# Patient Record
Sex: FEMALE | Race: WHITE | NOT HISPANIC OR LATINO | Employment: OTHER | URBAN - METROPOLITAN AREA
[De-identification: names, ages, dates, MRNs, and addresses within clinical notes are randomized per-mention and may not be internally consistent; named-entity substitution may affect disease eponyms.]

---

## 2017-03-06 ENCOUNTER — OFFICE VISIT (OUTPATIENT)
Dept: INTERNAL MEDICINE | Facility: CLINIC | Age: 72
End: 2017-03-06

## 2017-03-06 VITALS
HEART RATE: 98 BPM | BODY MASS INDEX: 26.11 KG/M2 | HEIGHT: 60 IN | SYSTOLIC BLOOD PRESSURE: 210 MMHG | WEIGHT: 133 LBS | TEMPERATURE: 97.8 F | DIASTOLIC BLOOD PRESSURE: 110 MMHG | RESPIRATION RATE: 16 BRPM | OXYGEN SATURATION: 98 %

## 2017-03-06 DIAGNOSIS — R30.0 DYSURIA: ICD-10-CM

## 2017-03-06 DIAGNOSIS — R10.32 LEFT LOWER QUADRANT PAIN: Primary | ICD-10-CM

## 2017-03-06 DIAGNOSIS — I10 MALIGNANT HYPERTENSION: ICD-10-CM

## 2017-03-06 DIAGNOSIS — R31.9 HEMATURIA: ICD-10-CM

## 2017-03-06 LAB
BILIRUB BLD-MCNC: NEGATIVE MG/DL
CLARITY, POC: CLEAR
COLOR UR: YELLOW
GLUCOSE UR STRIP-MCNC: NEGATIVE MG/DL
KETONES UR QL: NEGATIVE
LEUKOCYTE EST, POC: NEGATIVE
NITRITE UR-MCNC: NEGATIVE MG/ML
PH UR: 7 [PH] (ref 5–8)
PROT UR STRIP-MCNC: NEGATIVE MG/DL
RBC # UR STRIP: ABNORMAL /UL
SP GR UR: 1.01 (ref 1–1.03)
UROBILINOGEN UR QL: NORMAL

## 2017-03-06 PROCEDURE — 81003 URINALYSIS AUTO W/O SCOPE: CPT | Performed by: INTERNAL MEDICINE

## 2017-03-06 PROCEDURE — 99215 OFFICE O/P EST HI 40 MIN: CPT | Performed by: INTERNAL MEDICINE

## 2017-03-06 NOTE — PROGRESS NOTES
Subjective     Adali Hankins is a 71 y.o. female, who presents with a chief complaint of   Chief Complaint   Patient presents with   • Difficulty Urinating     incomplete empying   • Vaginal Bleeding     post coital/vaginal drying   • Back Pain       HPI   The pt c/o back pain x 3-4 days.  She has bilateral low back pain but the right is worse than the left.  She felt like she couldn't empty her bladder all the way.    Ibuprofen usually makes the pain better. She feels lots of pressure in her back. She does have HTN but usually is fairly well controlled with a 10mg of lisinopril.  She did have some blood with sex the last 2 times (last episode about 1 week ago).  Increased back pain after sex.  Her last period was around 49 years of age.  She has had an abnormal pap in the past and was following with Dr. Moss.  The pt says dr moss told her every thing was ok.  No previous hx of kidney stone.     The following portions of the patient's history were reviewed and updated as appropriate: allergies, current medications, past family history, past medical history, past social history, past surgical history and problem list.    Allergies: Review of patient's allergies indicates no known allergies.    Review of Systems   Constitutional: Negative.  Negative for fever.   HENT: Negative.    Eyes: Negative.    Respiratory: Negative.    Cardiovascular: Negative.    Gastrointestinal: Negative.    Endocrine: Negative.    Genitourinary: Positive for dyspareunia, dysuria, flank pain, pelvic pain and vaginal bleeding.   Skin: Negative.    Allergic/Immunologic: Negative.    Neurological: Negative.    Hematological: Negative.    Psychiatric/Behavioral: Negative.    All other systems reviewed and are negative.      Objective     Wt Readings from Last 3 Encounters:   03/06/17 133 lb (60.3 kg)   09/02/16 138 lb 3.2 oz (62.7 kg)   06/02/16 134 lb 12.8 oz (61.1 kg)     Temp Readings from Last 3 Encounters:   03/06/17 97.8 °F (36.6 °C)  (Oral)   06/02/16 97.8 °F (36.6 °C)   02/23/16 98.1 °F (36.7 °C)     BP Readings from Last 3 Encounters:   03/06/17 (!) 210/110   09/02/16 140/88   06/02/16 140/88     Pulse Readings from Last 3 Encounters:   03/06/17 98   09/02/16 90   06/02/16 93     Body mass index is 25.97 kg/(m^2).  SpO2 Readings from Last 3 Encounters:   03/06/17 98%   09/02/16 98%   06/02/16 98%       Physical Exam   Constitutional: She is oriented to person, place, and time. She appears well-developed and well-nourished. No distress.   HENT:   Head: Normocephalic and atraumatic.   Right Ear: External ear normal.   Left Ear: External ear normal.   Nose: Nose normal.   Mouth/Throat: Oropharynx is clear and moist.   Eyes: Conjunctivae and EOM are normal. Pupils are equal, round, and reactive to light.   Neck: Normal range of motion. Neck supple.   Cardiovascular: Normal rate, regular rhythm, normal heart sounds and intact distal pulses.    Pulmonary/Chest: Effort normal and breath sounds normal. No respiratory distress. She has no wheezes.   Abdominal: Soft. She exhibits no distension. There is tenderness. There is no rebound and no guarding.   Mild bilat cva tenderness  Left sided abd tenderness to palpation lower quadrant more sensitive than upper.  No rebound or guarding.     Musculoskeletal: Normal range of motion.   Normal gait   Neurological: She is alert and oriented to person, place, and time.   Skin: Skin is warm and dry.   Psychiatric: She has a normal mood and affect. Her behavior is normal. Judgment and thought content normal.   Nursing note and vitals reviewed.      Results for orders placed or performed in visit on 03/06/17   POCT urinalysis dipstick, automated   Result Value Ref Range    Color Yellow Yellow, Straw, Dark Yellow, Adriane    Clarity, UA Clear Clear    Glucose, UA Negative Negative, 1000 mg/dL (3+) mg/dL    Bilirubin Negative Negative    Ketones, UA Negative Negative    Specific Gravity  1.010 1.005 - 1.030    Blood,  UA Trace (A) Negative    pH, Urine 7.0 5.0 - 8.0    Protein, POC Negative Negative mg/dL    Urobilinogen, UA Normal Normal    Leukocytes Negative Negative    Nitrite, UA Negative Negative       Assessment/Plan   Adali was seen today for difficulty urinating, vaginal bleeding and back pain.    Diagnoses and all orders for this visit:    Left lower quadrant pain    Dysuria  -     POCT urinalysis dipstick, automated    Hematuria    Malignant hypertension      Case disussed with ER attending Dr. Prieto Jones. Pt needs referral to ER for stabilization of blood pressure and further work up of abd pain (labs/CT).  Concern for urinary obstruction or abdominal/ pathology causing pain and possible contributing to uncontrolled HTN at this time. I explained to patient that leaving her bp at 210/110 was dangerous and would put her at risk for stroke or death.       Outpatient Medications Prior to Visit   Medication Sig Dispense Refill   • aspirin 81 MG tablet Take 81 mg by mouth daily.     • CALCIUM CITRATE-VITAMIN D PO      • fluticasone (FLONASE) 50 MCG/ACT nasal spray 2 sprays into each nostril daily. 2 sprays each nostril once daily 3 each 3   • FOLIC ACID PO      • Ibuprofen 200 MG capsule      • lisinopril (PRINIVIL,ZESTRIL) 10 MG tablet TAKE 1 TABLET EVERY DAY 90 tablet 3   • phenylephrine (SUDAFED PE) 10 MG tablet      • simvastatin (ZOCOR) 20 MG tablet Take 1 tablet (20 mg total) by mouth daily. 90 tablet 3   • valACYclovir (VALTREX) 1000 MG tablet 2 po bid x 1 day at onset of fever blister 20 tablet 0   • ciprofloxacin (CIPRO) 500 MG tablet Take 1 tablet by mouth 2 (two) times a day. 14 tablet 0     No facility-administered medications prior to visit.      No orders of the defined types were placed in this encounter.    There are no discontinued medications.      Return for recheck after ER evaluation.

## 2017-04-04 ENCOUNTER — TELEPHONE (OUTPATIENT)
Dept: INTERNAL MEDICINE | Facility: CLINIC | Age: 72
End: 2017-04-04

## 2017-04-04 ENCOUNTER — HOSPITAL ENCOUNTER (EMERGENCY)
Facility: HOSPITAL | Age: 72
Discharge: HOME | End: 2017-04-04
Attending: EMERGENCY MEDICINE | Admitting: EMERGENCY MEDICINE

## 2017-04-04 VITALS
TEMPERATURE: 97.7 F | OXYGEN SATURATION: 100 % | HEIGHT: 60 IN | SYSTOLIC BLOOD PRESSURE: 197 MMHG | HEART RATE: 68 BPM | DIASTOLIC BLOOD PRESSURE: 94 MMHG | WEIGHT: 135 LBS | RESPIRATION RATE: 16 BRPM | BODY MASS INDEX: 26.5 KG/M2

## 2017-04-04 DIAGNOSIS — I10 ESSENTIAL HYPERTENSION: Primary | ICD-10-CM

## 2017-04-04 DIAGNOSIS — R33.9 URINARY RETENTION WITH INCOMPLETE BLADDER EMPTYING: ICD-10-CM

## 2017-04-04 LAB
ALBUMIN SERPL-MCNC: 4.8 G/DL (ref 3.5–5.2)
ALBUMIN/GLOB SERPL: 1.8 G/DL
ALP SERPL-CCNC: 47 U/L (ref 40–129)
ALT SERPL W P-5'-P-CCNC: 13 U/L (ref 5–33)
ANION GAP SERPL CALCULATED.3IONS-SCNC: 12.6 MMOL/L
AST SERPL-CCNC: 18 U/L (ref 5–32)
BACTERIA UR QL AUTO: ABNORMAL /HPF
BASOPHILS # BLD AUTO: 0.02 10*3/MM3 (ref 0–0.2)
BASOPHILS NFR BLD AUTO: 0.5 % (ref 0–2)
BILIRUB SERPL-MCNC: 0.6 MG/DL (ref 0.2–1.2)
BILIRUB UR QL STRIP: NEGATIVE
BUN BLD-MCNC: 9 MG/DL (ref 8–23)
BUN/CREAT SERPL: 10.7 (ref 7–25)
CALCIUM SPEC-SCNC: 9.5 MG/DL (ref 8.8–10.5)
CHLORIDE SERPL-SCNC: 101 MMOL/L (ref 98–107)
CLARITY UR: CLEAR
CO2 SERPL-SCNC: 28.4 MMOL/L (ref 22–29)
COLOR UR: YELLOW
CREAT BLD-MCNC: 0.84 MG/DL (ref 0.57–1)
DEPRECATED RDW RBC AUTO: 39.8 FL (ref 37–54)
EOSINOPHIL # BLD AUTO: 0.01 10*3/MM3 (ref 0.1–0.3)
EOSINOPHIL NFR BLD AUTO: 0.3 % (ref 0–4)
ERYTHROCYTE [DISTWIDTH] IN BLOOD BY AUTOMATED COUNT: 12.2 % (ref 11.5–14.5)
GFR SERPL CREATININE-BSD FRML MDRD: 67 ML/MIN/1.73
GLOBULIN UR ELPH-MCNC: 2.6 GM/DL
GLUCOSE BLD-MCNC: 97 MG/DL (ref 65–99)
GLUCOSE UR STRIP-MCNC: NEGATIVE MG/DL
HCT VFR BLD AUTO: 42.2 % (ref 37–47)
HGB BLD-MCNC: 14.3 G/DL (ref 12–16)
HGB UR QL STRIP.AUTO: ABNORMAL
HYALINE CASTS UR QL AUTO: ABNORMAL /LPF
IMM GRANULOCYTES # BLD: 0 10*3/MM3 (ref 0–0.03)
IMM GRANULOCYTES NFR BLD: 0 % (ref 0–0.5)
KETONES UR QL STRIP: NEGATIVE
LEUKOCYTE ESTERASE UR QL STRIP.AUTO: NEGATIVE
LYMPHOCYTES # BLD AUTO: 1.09 10*3/MM3 (ref 0.6–4.8)
LYMPHOCYTES NFR BLD AUTO: 28.3 % (ref 20–45)
MCH RBC QN AUTO: 30 PG (ref 27–31)
MCHC RBC AUTO-ENTMCNC: 33.9 G/DL (ref 31–37)
MCV RBC AUTO: 88.5 FL (ref 81–99)
MONOCYTES # BLD AUTO: 0.29 10*3/MM3 (ref 0–1)
MONOCYTES NFR BLD AUTO: 7.5 % (ref 3–8)
NEUTROPHILS # BLD AUTO: 2.44 10*3/MM3 (ref 1.5–8.3)
NEUTROPHILS NFR BLD AUTO: 63.4 % (ref 45–70)
NITRITE UR QL STRIP: NEGATIVE
NRBC BLD MANUAL-RTO: 0 /100 WBC (ref 0–0)
PH UR STRIP.AUTO: 7.5 [PH] (ref 4.5–8)
PLATELET # BLD AUTO: 159 10*3/MM3 (ref 140–500)
PMV BLD AUTO: 9.8 FL (ref 7.4–10.4)
POTASSIUM BLD-SCNC: 3.9 MMOL/L (ref 3.5–5.2)
PROT SERPL-MCNC: 7.4 G/DL (ref 6–8.5)
PROT UR QL STRIP: NEGATIVE
RBC # BLD AUTO: 4.77 10*6/MM3 (ref 4.2–5.4)
RBC # UR: ABNORMAL /HPF
REF LAB TEST METHOD: ABNORMAL
SODIUM BLD-SCNC: 142 MMOL/L (ref 136–145)
SP GR UR STRIP: 1.01 (ref 1–1.03)
SQUAMOUS #/AREA URNS HPF: ABNORMAL /HPF
UROBILINOGEN UR QL STRIP: ABNORMAL
WBC NRBC COR # BLD: 3.85 10*3/MM3 (ref 4.8–10.8)
WBC UR QL AUTO: ABNORMAL /HPF

## 2017-04-04 PROCEDURE — 81001 URINALYSIS AUTO W/SCOPE: CPT | Performed by: EMERGENCY MEDICINE

## 2017-04-04 PROCEDURE — 99284 EMERGENCY DEPT VISIT MOD MDM: CPT | Performed by: EMERGENCY MEDICINE

## 2017-04-04 PROCEDURE — 80053 COMPREHEN METABOLIC PANEL: CPT | Performed by: EMERGENCY MEDICINE

## 2017-04-04 PROCEDURE — 93010 ELECTROCARDIOGRAM REPORT: CPT | Performed by: INTERNAL MEDICINE

## 2017-04-04 PROCEDURE — 51798 US URINE CAPACITY MEASURE: CPT

## 2017-04-04 PROCEDURE — 85025 COMPLETE CBC W/AUTO DIFF WBC: CPT | Performed by: EMERGENCY MEDICINE

## 2017-04-04 PROCEDURE — 99283 EMERGENCY DEPT VISIT LOW MDM: CPT

## 2017-04-04 PROCEDURE — 93005 ELECTROCARDIOGRAM TRACING: CPT

## 2017-04-04 RX ORDER — LISINOPRIL 20 MG/1
20 TABLET ORAL DAILY
Qty: 30 TABLET | Refills: 0 | Status: SHIPPED | OUTPATIENT
Start: 2017-04-04 | End: 2017-05-04

## 2017-04-04 RX ADMIN — METOPROLOL TARTRATE 25 MG: 25 TABLET ORAL at 13:31

## 2017-04-04 NOTE — ED PROVIDER NOTES
Subjective   History of Present Illness  History of Present Illness    Chief complaint: High blood pressure and possible urinary retention    Location: Suprapubic discomfort    Quality/Severity:  Moderate    Timing/Duration: The patient relates that her blood pressure has been running consistently high for approximately 1 week in spite of taking double doses of her lisinopril 10 mg.  The patient is also been having some difficulty with fully emptying her bladder.    Modifying Factors: Patient was scheduled for a bladder scan last week that was not obtained for reasons that are unclear at this time.  Patient has been treated for hypertension for approximately 3 years.    Associated Symptoms: Urinary frequency    Narrative: The patient is a 71-year-old white female who presents as noted above.  The patient has been having some difficulty in fully emptying her bladder for some time and thinks this may be causing her blood pressure to be increased.  Over the past week the patient has been frequently checking her blood pressure.  The numbers provided by the patient seemed to show her blood pressure to be mildly elevated in the 160/90 range.  The patient did see her primary care physician Dr. Diaz, concerning the voiding problems and has not yet had a bladder scan.    Review of Systems   Constitutional: Negative for activity change, appetite change, fatigue and fever.   HENT: Negative for congestion.    Respiratory: Negative for cough, shortness of breath and wheezing.    Cardiovascular: Negative for chest pain, palpitations and leg swelling.   Gastrointestinal: Negative for abdominal pain, diarrhea, nausea and vomiting.   Endocrine: Negative for polydipsia.   Genitourinary: Positive for difficulty urinating, frequency and urgency. Negative for dysuria and flank pain.   Musculoskeletal: Negative for back pain.   Skin: Negative for rash.   Neurological: Negative for dizziness, weakness and headaches.    Psychiatric/Behavioral: Negative for confusion.       Past Medical History:   Diagnosis Date   • Arthritis    • History of carcinoma in situ of cervix uteri    • Hyperlipidemia    • Hypertension    • Hypoglycemia    • Vaginal prolapse        No Known Allergies    Past Surgical History:   Procedure Laterality Date   • BREAST BIOPSY Left     abcess drained surgically   • HERNIA REPAIR     • TUBAL ABDOMINAL LIGATION         Family History   Problem Relation Age of Onset   • Cancer Other      colon   • Hypertension Other        Social History     Social History   • Marital status:      Spouse name: N/A   • Number of children: N/A   • Years of education: N/A     Social History Main Topics   • Smoking status: Former Smoker   • Smokeless tobacco: None   • Alcohol use No   • Drug use: No   • Sexual activity: Not Asked     Other Topics Concern   • None     Social History Narrative   • None           Objective   Physical Exam   Constitutional: She is oriented to person, place, and time. She appears well-developed and well-nourished.   The patient is a pleasant, healthy-appearing, 71-year-old white female in no acute distress.   HENT:   Head: Normocephalic and atraumatic.   Eyes: Conjunctivae and EOM are normal.   Neck: Normal range of motion. Neck supple. No thyromegaly present.   No carotid bruits   Cardiovascular: Normal rate, regular rhythm and normal heart sounds.    No murmur heard.  Pulmonary/Chest: Effort normal and breath sounds normal. No respiratory distress. She has no wheezes. She has no rales.   Abdominal: Soft. Bowel sounds are normal. She exhibits no distension. There is no tenderness.   Bladder does not appear to be distended   Musculoskeletal: Normal range of motion. She exhibits no edema or tenderness.   Lymphadenopathy:     She has no cervical adenopathy.   Neurological: She is alert and oriented to person, place, and time.   Skin: Skin is warm and dry. No rash noted.   Psychiatric: She has a  normal mood and affect.   Nursing note and vitals reviewed.      Procedures         ED Course  ED Course   Comment By Time   04/04/17, 12:59 PM  EKG was reviewed at 1234 hours and showed a normal sinus rhythm with a rate of 82 bpm.  Poor R-wave progression.  ST segments and T waves unremarkable.  No ectopy. Jessee Ornelas MD 04/04 1300   04/04/17, 2:29 PM  Prevoid lateral volume of 570 mL and postvoid was 200 mL.  Findings discussed with patient. Jessee Ornelas MD 04/04 1429                  MDM  Number of Diagnoses or Management Options  Essential hypertension:   Urinary retention with incomplete bladder emptying:      Amount and/or Complexity of Data Reviewed  Clinical lab tests: ordered and reviewed  Independent visualization of images, tracings, or specimens: yes    Risk of Complications, Morbidity, and/or Mortality  Presenting problems: moderate  Diagnostic procedures: moderate  Management options: moderate      Labs this visit  Lab Results (last 24 hours)     Procedure Component Value Units Date/Time    Urinalysis With / Culture If Indicated [[id number]]  (Abnormal) Collected:  04/04/17 1331    Specimen:  Urine from Urine, Clean Catch Updated:  04/04/17 1358     Color, UA Yellow     Appearance, UA Clear     pH, UA 7.5     Specific Gravity, UA 1.010     Glucose, UA Negative     Ketones, UA Negative     Bilirubin, UA Negative     Blood, UA Trace (A)     Protein, UA Negative     Leuk Esterase, UA Negative     Nitrite, UA Negative     Urobilinogen, UA 0.2 E.U./dL    Urinalysis, Microscopic Only [[id number]]  (Abnormal) Collected:  04/04/17 1331    Specimen:  Urine from Urine, Clean Catch Updated:  04/04/17 1405     RBC, UA 0-2 (A) /HPF      WBC, UA None Seen /HPF      Bacteria, UA None Seen /HPF      Squamous Epithelial Cells, UA None Seen /HPF      Hyaline Casts, UA None Seen /LPF      Methodology Manual Light Microscopy    CBC & Differential [[id number]] Collected:  04/04/17 1342    Specimen:  Blood  Updated:  04/04/17 1349    Narrative:       The following orders were created for panel order CBC & Differential.  Procedure                               Abnormality         Status                     ---------                               -----------         ------                     CBC Auto Differential[[id number]]         Abnormal            Final result                 Please view results for these tests on the individual orders.    Comprehensive Metabolic Panel [[id number]] Collected:  04/04/17 1342    Specimen:  Blood Updated:  04/04/17 1427     Glucose 97 mg/dL      BUN 9 mg/dL      Creatinine 0.84 mg/dL      Sodium 142 mmol/L      Potassium 3.9 mmol/L      Chloride 101 mmol/L      CO2 28.4 mmol/L      Calcium 9.5 mg/dL      Total Protein 7.4 g/dL      Albumin 4.80 g/dL      ALT (SGPT) 13 U/L      AST (SGOT) 18 U/L      Alkaline Phosphatase 47 U/L      Total Bilirubin 0.6 mg/dL      eGFR Non African Amer 67 mL/min/1.73      Globulin 2.6 gm/dL      A/G Ratio 1.8 g/dL      BUN/Creatinine Ratio 10.7     Anion Gap 12.6 mmol/L     Narrative:       The MDRD GFR formula is only valid for adults with stable renal function between ages 18 and 70.    CBC Auto Differential [[id number]]  (Abnormal) Collected:  04/04/17 1342    Specimen:  Blood Updated:  04/04/17 1349     WBC 3.85 (L) 10*3/mm3      RBC 4.77 10*6/mm3      Hemoglobin 14.3 g/dL      Hematocrit 42.2 %      MCV 88.5 fL      MCH 30.0 pg      MCHC 33.9 g/dL      RDW 12.2 %      RDW-SD 39.8 fl      MPV 9.8 fL      Platelets 159 10*3/mm3      Neutrophil % 63.4 %      Lymphocyte % 28.3 %      Monocyte % 7.5 %      Eosinophil % 0.3 %      Basophil % 0.5 %      Immature Grans % 0.0 %      Neutrophils, Absolute 2.44 10*3/mm3      Lymphocytes, Absolute 1.09 10*3/mm3      Monocytes, Absolute 0.29 10*3/mm3      Eosinophils, Absolute 0.01 (L) 10*3/mm3      Basophils, Absolute 0.02 10*3/mm3      Immature Grans, Absolute 0.00 10*3/mm3      nRBC 0.0 /100 WBC         Prescribed  on discharge             Medication List      Changed          lisinopril 20 MG tablet   Commonly known as:  PRINIVIL,ZESTRIL   Take 1 tablet by mouth Daily for 30 days.   What changed:  See the new instructions.         Stop          ciprofloxacin 500 MG tablet   Commonly known as:  CIPRO       phenylephrine 10 MG tablet   Commonly known as:  SUDAFED PE       simvastatin 20 MG tablet   Commonly known as:  ZOCOR           All lab results, imaging results and other tests were reviewed by Jessee Ornelas MD and unless otherwise specified were found to be unremarkable.      Final diagnoses:   Essential hypertension   Urinary retention with incomplete bladder emptying            Jessee Ornelas MD  04/04/17 [id number]

## 2017-04-04 NOTE — TELEPHONE ENCOUNTER
I called patient back, she was under the impression that Dr. Diaz was supposed to set up an X-Ray or some sort of imaging test the day she was here. When I looked in her chart, I saw where she was being sent to the ER for her treatment of hypertension and possible urinary obstruction. Patient states that when she went downstairs that day, registration told her that they did not have an order for her, when Dr. Diaz put the order in for her imaging they would call her with an appointment, and they sent her home. I explained that her blood pressure was dangerously high that day and she should have been evaluated in the ER. She is still having elevated blood pressure and wants to know if she needs to be seen or if Dr. Diaz could go ahead and order the imaging for her to have done. I explained that Dr. Diaz is on vacation right now and that I could talk to Dr. Taylor to see what needs to be done. After talking with Dr. Taylor, she does not have any available acute spots today and she feels it would be best if she went ahead and go to the ER. Patient advised, she understands.   ----- Message from Anna Lazo MA sent at 4/4/2017  9:41 AM EDT -----      ----- Message -----     From: Kristel Degroot     Sent: 4/4/2017   9:14 AM       To: [facility]    Pt states she was supposed to have an xray-not sure what for. States its for a uti. Maybe her bladder or urinary tract. Pt is not sure. Was here on 3/6 and it was stemming from that apt. Pt wants to know if an ultra sound would be better? Can we look in to this for her? Pt call back is [phone].

## 2017-06-16 ENCOUNTER — OFFICE VISIT (OUTPATIENT)
Dept: INTERNAL MEDICINE | Facility: CLINIC | Age: 72
End: 2017-06-16

## 2017-06-16 VITALS
BODY MASS INDEX: 26.35 KG/M2 | WEIGHT: 134.2 LBS | DIASTOLIC BLOOD PRESSURE: 100 MMHG | SYSTOLIC BLOOD PRESSURE: 160 MMHG | HEART RATE: 98 BPM | OXYGEN SATURATION: 98 % | HEIGHT: 60 IN

## 2017-06-16 DIAGNOSIS — I10 ESSENTIAL HYPERTENSION: ICD-10-CM

## 2017-06-16 DIAGNOSIS — R33.9 URINARY RETENTION: Primary | ICD-10-CM

## 2017-06-16 PROCEDURE — 99214 OFFICE O/P EST MOD 30 MIN: CPT | Performed by: INTERNAL MEDICINE

## 2017-06-16 RX ORDER — LISINOPRIL 30 MG/1
30 TABLET ORAL DAILY
Qty: 30 TABLET | Refills: 0 | Status: SHIPPED | OUTPATIENT
Start: 2017-06-16 | End: 2017-07-12 | Stop reason: SDUPTHER

## 2017-06-16 RX ORDER — FOLIC ACID 0.8 MG
TABLET ORAL
COMMUNITY

## 2017-06-16 RX ORDER — LANOLIN ALCOHOL/MO/W.PET/CERES
1000 CREAM (GRAM) TOPICAL DAILY
COMMUNITY

## 2017-06-16 NOTE — PROGRESS NOTES
"Subjective     Adali Hankins is a 71 y.o. female, who presents with a chief complaint of   Chief Complaint   Patient presents with   • Follow-up     2 mon f/u, med check, pt has x questions    • Hypertension     bp log in office        HPI Comments: 72 yo F here for follow up of her HTN. All of her problems are new to me and she normally sees Dr. Diaz.     owen is here for follow up of her HTN. She is taking 20mg of lisinopril and has her log with her today. Mostly running 140-150/. Tolerating medication well with no side effects of dizziness or SOB or headache.     The patient was seen in the ER and had a post-void residual that was elevated in 4/2017. Her bowels are moving good and has a bowel movement every day. Never had a CT scan of her belly. She has not had a pap in sometime. Her urine is normal per her UA in the ER except trace blood. She has not lost weight. She states that she feels \"full\" in her pelvic area.        The following portions of the patient's history were reviewed and updated as appropriate: allergies, current medications, past family history, past medical history, past social history, past surgical history and problem list.    Allergies: Review of patient's allergies indicates no known allergies.    Current Outpatient Prescriptions:   •  aspirin 81 MG tablet, Take 81 mg by mouth daily., Disp: , Rfl:   •  CALCIUM CITRATE-VITAMIN D PO, , Disp: , Rfl:   •  Cranberry-Vitamin C (AZO CRANBERRY URINARY TRACT PO), Take  by mouth 2 (Two) Times a Day., Disp: , Rfl:   •  fluticasone (FLONASE) 50 MCG/ACT nasal spray, 2 sprays into each nostril daily. 2 sprays each nostril once daily, Disp: 3 each, Rfl: 3  •  FOLIC ACID PO, 800 mg., Disp: , Rfl:   •  Ibuprofen 200 MG capsule, , Disp: , Rfl:   •  lisinopril (PRINIVIL,ZESTRIL) 30 MG tablet, Take 1 tablet by mouth Daily., Disp: 30 tablet, Rfl: 0  •  Magnesium 500 MG capsule, Take  by mouth., Disp: , Rfl:   •  phenylephrine (SUDAFED PE) 10 MG tablet, , " "Disp: , Rfl:   •  simvastatin (ZOCOR) 20 MG tablet, Take 1 tablet (20 mg total) by mouth daily., Disp: 90 tablet, Rfl: 3  •  vitamin B-12 (CYANOCOBALAMIN) 1000 MCG tablet, Take 1,000 mcg by mouth Daily., Disp: , Rfl:   Medications Discontinued During This Encounter   Medication Reason   • ciprofloxacin (CIPRO) 500 MG tablet    • valACYclovir (VALTREX) 1000 MG tablet    • lisinopril (PRINIVIL,ZESTRIL) 10 MG tablet Reorder       Review of Systems   Constitutional: Negative for chills and fever.   Respiratory: Negative for cough and shortness of breath.    Gastrointestinal: Negative for abdominal pain, constipation, diarrhea and vomiting.   Genitourinary: Positive for difficulty urinating and urgency.   Musculoskeletal: Negative for back pain.   Skin: Negative for rash.       Objective     /100  Pulse 98  Ht 60\" (152.4 cm)  Wt 134 lb 3.2 oz (60.9 kg)  SpO2 98%  BMI 26.21 kg/m2      Physical Exam   Constitutional: She is oriented to person, place, and time. She appears well-developed and well-nourished. No distress.   HENT:   Head: Normocephalic and atraumatic.   Right Ear: External ear normal.   Left Ear: External ear normal.   Mouth/Throat: Oropharynx is clear and moist. No oropharyngeal exudate.   Eyes: Conjunctivae are normal. Right eye exhibits no discharge. Left eye exhibits no discharge. No scleral icterus.   Neck: Neck supple.   Cardiovascular: Normal rate, regular rhythm and normal heart sounds.  Exam reveals no gallop and no friction rub.    No murmur heard.  Pulmonary/Chest: Effort normal and breath sounds normal. No respiratory distress. She has no wheezes. She has no rales.   Abdominal: Soft. Bowel sounds are normal. She exhibits no distension and no mass. There is no tenderness (No tenderness or massses palpated. Did not palpate bladder. ). There is no guarding.   Lymphadenopathy:     She has no cervical adenopathy.   Neurological: She is alert and oriented to person, place, and time.   Skin: " Skin is warm. No rash noted.   Psychiatric: She has a normal mood and affect. Her behavior is normal.   Nursing note and vitals reviewed.        Results for orders placed or performed during the hospital encounter of 04/04/17   Comprehensive Metabolic Panel   Result Value Ref Range    Glucose 97 65 - 99 mg/dL    BUN 9 8 - 23 mg/dL    Creatinine 0.84 0.57 - 1.00 mg/dL    Sodium 142 136 - 145 mmol/L    Potassium 3.9 3.5 - 5.2 mmol/L    Chloride 101 98 - 107 mmol/L    CO2 28.4 22.0 - 29.0 mmol/L    Calcium 9.5 8.8 - 10.5 mg/dL    Total Protein 7.4 6.0 - 8.5 g/dL    Albumin 4.80 3.50 - 5.20 g/dL    ALT (SGPT) 13 5 - 33 U/L    AST (SGOT) 18 5 - 32 U/L    Alkaline Phosphatase 47 40 - 129 U/L    Total Bilirubin 0.6 0.2 - 1.2 mg/dL    eGFR Non African Amer 67 >60 mL/min/1.73    Globulin 2.6 gm/dL    A/G Ratio 1.8 g/dL    BUN/Creatinine Ratio 10.7 7.0 - 25.0    Anion Gap 12.6 mmol/L   Urinalysis With / Culture If Indicated   Result Value Ref Range    Color, UA Yellow Yellow, Straw    Appearance, UA Clear Clear    pH, UA 7.5 4.5 - 8.0    Specific Gravity, UA 1.010 1.003 - 1.030    Glucose, UA Negative Negative    Ketones, UA Negative Negative, 80 mg/dL (3+), >=160 mg/dL (4+)    Bilirubin, UA Negative Negative    Blood, UA Trace (A) Negative    Protein, UA Negative Negative    Leuk Esterase, UA Negative Negative    Nitrite, UA Negative Negative    Urobilinogen, UA 0.2 E.U./dL 0.2 - 1.0 E.U./dL   CBC Auto Differential   Result Value Ref Range    WBC 3.85 (L) 4.80 - 10.80 10*3/mm3    RBC 4.77 4.20 - 5.40 10*6/mm3    Hemoglobin 14.3 12.0 - 16.0 g/dL    Hematocrit 42.2 37.0 - 47.0 %    MCV 88.5 81.0 - 99.0 fL    MCH 30.0 27.0 - 31.0 pg    MCHC 33.9 31.0 - 37.0 g/dL    RDW 12.2 11.5 - 14.5 %    RDW-SD 39.8 37.0 - 54.0 fl    MPV 9.8 7.4 - 10.4 fL    Platelets 159 140 - 500 10*3/mm3    Neutrophil % 63.4 45.0 - 70.0 %    Lymphocyte % 28.3 20.0 - 45.0 %    Monocyte % 7.5 3.0 - 8.0 %    Eosinophil % 0.3 0.0 - 4.0 %    Basophil % 0.5 0.0  - 2.0 %    Immature Grans % 0.0 0.0 - 0.5 %    Neutrophils, Absolute 2.44 1.50 - 8.30 10*3/mm3    Lymphocytes, Absolute 1.09 0.60 - 4.80 10*3/mm3    Monocytes, Absolute 0.29 0.00 - 1.00 10*3/mm3    Eosinophils, Absolute 0.01 (L) 0.10 - 0.30 10*3/mm3    Basophils, Absolute 0.02 0.00 - 0.20 10*3/mm3    Immature Grans, Absolute 0.00 0.00 - 0.03 10*3/mm3    nRBC 0.0 0.0 - 0.0 /100 WBC   Urinalysis, Microscopic Only   Result Value Ref Range    RBC, UA 0-2 (A) None Seen /HPF    WBC, UA None Seen None Seen /HPF    Bacteria, UA None Seen None Seen /HPF    Squamous Epithelial Cells, UA None Seen None Seen, 0-2 /HPF    Hyaline Casts, UA None Seen None Seen /LPF    Methodology Manual Light Microscopy        Assessment/Plan   Adali was seen today for follow-up and hypertension.    Diagnoses and all orders for this visit:    Urinary retention  -     Ambulatory Referral to Urology    Essential hypertension  -     lisinopril (PRINIVIL,ZESTRIL) 30 MG tablet; Take 1 tablet by mouth Daily.    Will send to urology to evaluate her urinary retention. No medications that she is on seem to be cause. She had PVD that was around 200cc in the ER and I think this should be followed closely. UA is roughly normal with some trace blood, but otherwise normal.     HTN is definitely higher here and has some component of white coat HTN. Will increase to 30mg on her lisinopril and return for follow up next week and at that time will also do a pelvic exam to evaluate for other causes of retention such as obstruction.     Return in about 1 week (around 6/23/2017) for Recheck of HTN and womens exam to follow urinary retention.    Kristel Veronica MD  06/16/2017

## 2017-06-23 ENCOUNTER — OFFICE VISIT (OUTPATIENT)
Dept: INTERNAL MEDICINE | Facility: CLINIC | Age: 72
End: 2017-06-23

## 2017-06-23 VITALS
HEIGHT: 60 IN | OXYGEN SATURATION: 99 % | DIASTOLIC BLOOD PRESSURE: 90 MMHG | TEMPERATURE: 97.6 F | WEIGHT: 136 LBS | SYSTOLIC BLOOD PRESSURE: 158 MMHG | BODY MASS INDEX: 26.7 KG/M2 | HEART RATE: 87 BPM

## 2017-06-23 DIAGNOSIS — IMO0001 HTN, WHITE COAT: ICD-10-CM

## 2017-06-23 DIAGNOSIS — N95.1 MENOPAUSAL VAGINAL DRYNESS: ICD-10-CM

## 2017-06-23 DIAGNOSIS — R33.9 URINARY RETENTION: Primary | ICD-10-CM

## 2017-06-23 DIAGNOSIS — N36.8 URETHRAL CYST: ICD-10-CM

## 2017-06-23 PROCEDURE — 99214 OFFICE O/P EST MOD 30 MIN: CPT | Performed by: INTERNAL MEDICINE

## 2017-06-23 PROCEDURE — 81003 URINALYSIS AUTO W/O SCOPE: CPT | Performed by: INTERNAL MEDICINE

## 2017-06-23 NOTE — PROGRESS NOTES
Subjective     Adali Hankins is a 71 y.o. female, who presents with a chief complaint of   Chief Complaint   Patient presents with   • Follow-up     Dr. Diaz pt, 1 wk f/u    • Hypertension   • Urinary Retention     1 wk f/u        HPI Comments: 72 yo F with urinary retention here for her well women check and to make sure no other issues causing urinary retention. Patient is here for follow up of her urinary retention. She is seeing urology and has follow up CT scan as well as cystoscope in a few weeks. Still having issues where she feels that she is not voiding properly.     Her BP has been running good at home on 30mg of ACE. Usually 130/80 with no dizziness or headache. It is always higher when she is here.        The following portions of the patient's history were reviewed and updated as appropriate: allergies, current medications, past family history, past medical history, past social history, past surgical history and problem list.    Allergies: Review of patient's allergies indicates no known allergies.    Current Outpatient Prescriptions:   •  aspirin 81 MG tablet, Take 81 mg by mouth daily., Disp: , Rfl:   •  CALCIUM CITRATE-VITAMIN D PO, , Disp: , Rfl:   •  Cranberry-Vitamin C (AZO CRANBERRY URINARY TRACT PO), Take  by mouth 2 (Two) Times a Day., Disp: , Rfl:   •  fluticasone (FLONASE) 50 MCG/ACT nasal spray, 2 sprays into each nostril daily. 2 sprays each nostril once daily, Disp: 3 each, Rfl: 3  •  FOLIC ACID PO, 800 mg., Disp: , Rfl:   •  Ibuprofen 200 MG capsule, , Disp: , Rfl:   •  lisinopril (PRINIVIL,ZESTRIL) 30 MG tablet, Take 1 tablet by mouth Daily., Disp: 30 tablet, Rfl: 0  •  Magnesium 500 MG capsule, Take  by mouth., Disp: , Rfl:   •  phenylephrine (SUDAFED PE) 10 MG tablet, , Disp: , Rfl:   •  simvastatin (ZOCOR) 20 MG tablet, Take 1 tablet (20 mg total) by mouth daily., Disp: 90 tablet, Rfl: 3  •  vitamin B-12 (CYANOCOBALAMIN) 1000 MCG tablet, Take 1,000 mcg by mouth Daily., Disp: , Rfl:  "  There are no discontinued medications.    Review of Systems   Constitutional: Negative for chills and fever.   Respiratory: Negative for cough and shortness of breath.    Genitourinary: Positive for decreased urine volume and dyspareunia. Negative for difficulty urinating, dysuria, frequency and vaginal pain.       Objective     /90 (BP Location: Left arm, Patient Position: Sitting, Cuff Size: Adult)  Pulse 87  Temp 97.6 °F (36.4 °C) (Oral)   Ht 60\" (152.4 cm)  Wt 136 lb (61.7 kg)  SpO2 99%  BMI 26.56 kg/m2      Physical Exam   Constitutional: She is oriented to person, place, and time. She appears well-developed and well-nourished. No distress.   HENT:   Head: Normocephalic and atraumatic.   Right Ear: External ear normal.   Left Ear: External ear normal.   Mouth/Throat: Oropharynx is clear and moist. No oropharyngeal exudate.   Eyes: Conjunctivae are normal. Right eye exhibits no discharge. Left eye exhibits no discharge. No scleral icterus.   Genitourinary: Vagina normal. Pelvic exam was performed with patient prone. There is no rash, tenderness or lesion on the right labia. There is no rash, tenderness or lesion on the left labia. Cervix exhibits no motion tenderness, no discharge and no friability.   Genitourinary Comments: Urethral cyst to the left of her urethra. No prolapse of her vagina or rectum .    Neurological: She is alert and oriented to person, place, and time.   Skin: Skin is warm. No rash noted.   Psychiatric: She has a normal mood and affect. Her behavior is normal.   Nursing note and vitals reviewed.      Lab Results (most recent)     Procedure Component Value Units Date/Time    POCT urinalysis dipstick, automated [[id number]]  (Abnormal) Collected:  06/23/17 1201    Specimen:  Urine Updated:  06/23/17 1202     Color Yellow     Clarity, UA Clear     Glucose, UA Negative mg/dL      Bilirubin Negative     Ketones, UA Negative     Specific Gravity  1.010     Blood, UA Trace (A)     pH, " Urine 7.0     Protein, POC Negative mg/dL      Urobilinogen, UA Normal     Leukocytes Negative     Nitrite, UA Negative          Results for orders placed or performed in visit on 06/23/17   POCT urinalysis dipstick, automated   Result Value Ref Range    Color Yellow Yellow, Straw, Dark Yellow, Adriane    Clarity, UA Clear Clear    Glucose, UA Negative Negative, 1000 mg/dL (3+) mg/dL    Bilirubin Negative Negative    Ketones, UA Negative Negative    Specific Gravity  1.010 1.005 - 1.030    Blood, UA Trace (A) Negative    pH, Urine 7.0 5.0 - 8.0    Protein, POC Negative Negative mg/dL    Urobilinogen, UA Normal Normal    Leukocytes Negative Negative    Nitrite, UA Negative Negative       Assessment/Plan   Adali was seen today for follow-up, hypertension and urinary retention.    Diagnoses and all orders for this visit:    Urinary retention  -     POCT urinalysis dipstick, automated    Urethral cyst    HTN, white coat    Menopausal vaginal dryness      Will follow up with Dr. Duggan as scheduled. Exam is normal other than cyst and I would like him to take a look at this when he does her cystoscope. I don't think that this is causing issue, but should be evaluated by him.     Cocunut oil for dryness and premarin if does not help.     HTN is under much better control at home on 30mg of lisinopril. Will follow up with Dr. Diaz in 6 months with labs.Knows to call if concerns.     Return in about 6 months (around 12/23/2017) for Recheck.    Kristel Veronica MD  06/23/2017

## 2017-07-12 DIAGNOSIS — I10 ESSENTIAL HYPERTENSION: ICD-10-CM

## 2017-07-12 RX ORDER — LISINOPRIL 30 MG/1
30 TABLET ORAL DAILY
Qty: 90 TABLET | Refills: 0 | Status: SHIPPED | OUTPATIENT
Start: 2017-07-12 | End: 2017-09-18 | Stop reason: SDUPTHER

## 2017-09-18 DIAGNOSIS — I10 ESSENTIAL HYPERTENSION: ICD-10-CM

## 2017-09-19 RX ORDER — LISINOPRIL 30 MG/1
TABLET ORAL
Qty: 90 TABLET | Refills: 1 | Status: SHIPPED | OUTPATIENT
Start: 2017-09-19 | End: 2017-12-13

## 2017-10-25 ENCOUNTER — TRANSCRIBE ORDERS (OUTPATIENT)
Dept: ADMINISTRATIVE | Facility: HOSPITAL | Age: 72
End: 2017-10-25

## 2017-10-25 DIAGNOSIS — Z12.39 ENCOUNTER FOR SCREENING BREAST EXAMINATION: Primary | ICD-10-CM

## 2017-11-08 ENCOUNTER — HOSPITAL ENCOUNTER (OUTPATIENT)
Dept: MAMMOGRAPHY | Facility: HOSPITAL | Age: 72
Discharge: HOME | End: 2017-11-08
Attending: INTERNAL MEDICINE | Admitting: INTERNAL MEDICINE

## 2017-11-08 DIAGNOSIS — Z12.39 ENCOUNTER FOR SCREENING BREAST EXAMINATION: ICD-10-CM

## 2017-11-08 PROCEDURE — G0202 SCR MAMMO BI INCL CAD: HCPCS

## 2017-11-08 PROCEDURE — 77063 BREAST TOMOSYNTHESIS BI: CPT

## 2017-11-10 ENCOUNTER — TELEPHONE (OUTPATIENT)
Dept: INTERNAL MEDICINE | Facility: CLINIC | Age: 72
End: 2017-11-10

## 2017-11-10 NOTE — TELEPHONE ENCOUNTER
Patient has been advised and voiced understanding.     ----- Message from Ana Luisa Diaz MD sent at 11/10/2017 10:42 AM EST -----  Mammogram normal.  Repeat 1 year

## 2017-12-06 ENCOUNTER — LAB (OUTPATIENT)
Dept: INTERNAL MEDICINE | Facility: CLINIC | Age: 72
End: 2017-12-06

## 2017-12-06 DIAGNOSIS — I10 ESSENTIAL HYPERTENSION: ICD-10-CM

## 2017-12-06 DIAGNOSIS — E78.5 HYPERLIPIDEMIA, UNSPECIFIED HYPERLIPIDEMIA TYPE: ICD-10-CM

## 2017-12-06 DIAGNOSIS — R73.9 HYPERGLYCEMIA: ICD-10-CM

## 2017-12-06 DIAGNOSIS — R73.9 HYPERGLYCEMIA: Primary | ICD-10-CM

## 2017-12-06 LAB
ALBUMIN SERPL-MCNC: 4.6 G/DL (ref 3.5–5.2)
ALBUMIN/GLOB SERPL: 1.9 G/DL
ALP SERPL-CCNC: 45 U/L (ref 40–129)
ALT SERPL-CCNC: 13 U/L (ref 5–33)
AST SERPL-CCNC: 17 U/L (ref 5–32)
BASOPHILS # BLD AUTO: 0.04 10*3/MM3 (ref 0–0.2)
BASOPHILS NFR BLD AUTO: 1.1 % (ref 0–2)
BILIRUB SERPL-MCNC: 0.7 MG/DL (ref 0.2–1.2)
BUN SERPL-MCNC: 10 MG/DL (ref 8–23)
BUN/CREAT SERPL: 12.3 (ref 7–25)
CALCIUM SERPL-MCNC: 9.6 MG/DL (ref 8.8–10.5)
CHLORIDE SERPL-SCNC: 100 MMOL/L (ref 98–107)
CHOLEST SERPL-MCNC: 308 MG/DL (ref 0–200)
CO2 SERPL-SCNC: 29.9 MMOL/L (ref 22–29)
CREAT SERPL-MCNC: 0.81 MG/DL (ref 0.57–1)
EOSINOPHIL # BLD AUTO: 0.05 10*3/MM3 (ref 0.1–0.3)
EOSINOPHIL NFR BLD AUTO: 1.4 % (ref 0–4)
ERYTHROCYTE [DISTWIDTH] IN BLOOD BY AUTOMATED COUNT: 12.4 % (ref 11.5–14.5)
GFR SERPLBLD CREATININE-BSD FMLA CKD-EPI: 69 ML/MIN/1.73
GFR SERPLBLD CREATININE-BSD FMLA CKD-EPI: 84 ML/MIN/1.73
GLOBULIN SER CALC-MCNC: 2.4 GM/DL
GLUCOSE SERPL-MCNC: 93 MG/DL (ref 65–99)
HBA1C MFR BLD: 5.2 % (ref 4.8–5.6)
HCT VFR BLD AUTO: 40 % (ref 37–47)
HDLC SERPL-MCNC: 110 MG/DL (ref 40–60)
HGB BLD-MCNC: 13.9 G/DL (ref 12–16)
IMM GRANULOCYTES # BLD: 0 10*3/MM3 (ref 0–0.03)
IMM GRANULOCYTES NFR BLD: 0 % (ref 0–0.5)
LDLC SERPL CALC-MCNC: 184 MG/DL (ref 0–100)
LDLC/HDLC SERPL: 1.67 {RATIO}
LYMPHOCYTES # BLD AUTO: 1.35 10*3/MM3 (ref 0.6–4.8)
LYMPHOCYTES NFR BLD AUTO: 38.6 % (ref 20–45)
MCH RBC QN AUTO: 30.8 PG (ref 27–31)
MCHC RBC AUTO-ENTMCNC: 34.8 G/DL (ref 31–37)
MCV RBC AUTO: 88.7 FL (ref 81–99)
MONOCYTES # BLD AUTO: 0.36 10*3/MM3 (ref 0–1)
MONOCYTES NFR BLD AUTO: 10.3 % (ref 3–8)
NEUTROPHILS # BLD AUTO: 1.7 10*3/MM3 (ref 1.5–8.3)
NEUTROPHILS NFR BLD AUTO: 48.6 % (ref 45–70)
NRBC BLD AUTO-RTO: 0 /100 WBC (ref 0–0)
PLATELET # BLD AUTO: 173 10*3/MM3 (ref 140–500)
POTASSIUM SERPL-SCNC: 4.8 MMOL/L (ref 3.5–5.2)
PROT SERPL-MCNC: 7 G/DL (ref 6–8.5)
RBC # BLD AUTO: 4.51 10*6/MM3 (ref 4.2–5.4)
SODIUM SERPL-SCNC: 140 MMOL/L (ref 136–145)
TRIGL SERPL-MCNC: 69 MG/DL (ref 0–150)
VLDLC SERPL CALC-MCNC: 13.8 MG/DL (ref 7–27)
WBC # BLD AUTO: 3.5 10*3/MM3 (ref 4.8–10.8)

## 2017-12-08 ENCOUNTER — TELEPHONE (OUTPATIENT)
Dept: INTERNAL MEDICINE | Facility: CLINIC | Age: 72
End: 2017-12-08

## 2017-12-08 NOTE — TELEPHONE ENCOUNTER
----- Message from Ana Luisa Diaz MD sent at 12/8/2017  3:21 PM EST -----  Cholesterol >300  Need to discuss at upcoming appt.

## 2017-12-13 ENCOUNTER — OFFICE VISIT (OUTPATIENT)
Dept: INTERNAL MEDICINE | Facility: CLINIC | Age: 72
End: 2017-12-13

## 2017-12-13 VITALS
WEIGHT: 132.4 LBS | SYSTOLIC BLOOD PRESSURE: 160 MMHG | HEART RATE: 90 BPM | OXYGEN SATURATION: 99 % | BODY MASS INDEX: 26 KG/M2 | HEIGHT: 60 IN | DIASTOLIC BLOOD PRESSURE: 100 MMHG

## 2017-12-13 DIAGNOSIS — M85.80 OSTEOPENIA, UNSPECIFIED LOCATION: ICD-10-CM

## 2017-12-13 DIAGNOSIS — I10 ESSENTIAL HYPERTENSION: ICD-10-CM

## 2017-12-13 DIAGNOSIS — Z78.0 POST-MENOPAUSAL: ICD-10-CM

## 2017-12-13 DIAGNOSIS — E78.5 HYPERLIPIDEMIA, UNSPECIFIED HYPERLIPIDEMIA TYPE: Primary | ICD-10-CM

## 2017-12-13 PROCEDURE — 99214 OFFICE O/P EST MOD 30 MIN: CPT | Performed by: INTERNAL MEDICINE

## 2017-12-13 RX ORDER — ATORVASTATIN CALCIUM 10 MG/1
10 TABLET, FILM COATED ORAL DAILY
Qty: 90 TABLET | Refills: 1 | Status: SHIPPED | OUTPATIENT
Start: 2017-12-13 | End: 2018-05-31 | Stop reason: SDUPTHER

## 2017-12-13 RX ORDER — LISINOPRIL 40 MG/1
40 TABLET ORAL DAILY
Qty: 90 TABLET | Refills: 1 | Status: SHIPPED | OUTPATIENT
Start: 2017-12-13 | End: 2018-06-06 | Stop reason: SDUPTHER

## 2017-12-13 NOTE — PROGRESS NOTES
Subjective     Adali Hankins is a 72 y.o. female, who presents with a chief complaint of   Chief Complaint   Patient presents with   • Follow-up     Had labs last week   • Hypertension       HPI   The pt is here for follow up.      HTN - her bp is up today. No ha/dizzienss.  She says she is 132-160/80's at home.  She avoids salt in her diet.    She has urinary issues and hhas seen dr. Newsome with urology.      hld - not on meds bc hdl/ldl ratio has been low but total chol now >300.      The following portions of the patient's history were reviewed and updated as appropriate: allergies, current medications, past family history, past medical history, past social history, past surgical history and problem list.    Allergies: Review of patient's allergies indicates no known allergies.    Review of Systems   Constitutional: Negative.    HENT: Negative.    Eyes: Negative.    Respiratory: Negative.    Cardiovascular: Negative.    Gastrointestinal: Negative.    Endocrine: Negative.    Genitourinary: Positive for dysuria.   Musculoskeletal: Negative.    Skin: Negative.    Allergic/Immunologic: Negative.    Neurological: Negative.    Hematological: Negative.    Psychiatric/Behavioral: Negative.    All other systems reviewed and are negative.      Objective     Wt Readings from Last 3 Encounters:   12/13/17 60.1 kg (132 lb 6.4 oz)   06/23/17 61.7 kg (136 lb)   06/16/17 60.9 kg (134 lb 3.2 oz)     Temp Readings from Last 3 Encounters:   06/23/17 97.6 °F (36.4 °C) (Oral)   04/04/17 97.7 °F (36.5 °C) (Oral)   03/06/17 97.8 °F (36.6 °C) (Oral)     BP Readings from Last 3 Encounters:   12/13/17 160/100   06/23/17 158/90   06/16/17 160/100     Pulse Readings from Last 3 Encounters:   12/13/17 90   06/23/17 87   06/16/17 98     Body mass index is 25.86 kg/(m^2).  SpO2 Readings from Last 3 Encounters:   12/13/17 99%   06/23/17 99%   06/16/17 98%       Physical Exam   Constitutional: She is oriented to person, place, and time. She  appears well-developed and well-nourished. No distress.   HENT:   Head: Normocephalic and atraumatic.   Right Ear: External ear normal.   Left Ear: External ear normal.   Nose: Nose normal.   Mouth/Throat: Oropharynx is clear and moist.   Eyes: Conjunctivae and EOM are normal. Pupils are equal, round, and reactive to light.   Neck: Normal range of motion. Neck supple.   Cardiovascular: Normal rate, regular rhythm, normal heart sounds and intact distal pulses.    Pulmonary/Chest: Effort normal and breath sounds normal. No respiratory distress. She has no wheezes.   Musculoskeletal: Normal range of motion.   Normal gait   Neurological: She is alert and oriented to person, place, and time.   Skin: Skin is warm and dry.   Psychiatric: She has a normal mood and affect. Her behavior is normal. Judgment and thought content normal.   Nursing note and vitals reviewed.      Results for orders placed or performed in visit on 12/06/17   Comprehensive metabolic panel   Result Value Ref Range    Glucose 93 65 - 99 mg/dL    BUN 10 8 - 23 mg/dL    Creatinine 0.81 0.57 - 1.00 mg/dL    eGFR Non African Am 69 >60 mL/min/1.73    eGFR African Am 84 >60 mL/min/1.73    BUN/Creatinine Ratio 12.3 7.0 - 25.0    Sodium 140 136 - 145 mmol/L    Potassium 4.8 3.5 - 5.2 mmol/L    Chloride 100 98 - 107 mmol/L    Total CO2 29.9 (H) 22.0 - 29.0 mmol/L    Calcium 9.6 8.8 - 10.5 mg/dL    Total Protein 7.0 6.0 - 8.5 g/dL    Albumin 4.60 3.50 - 5.20 g/dL    Globulin 2.4 gm/dL    A/G Ratio 1.9 g/dL    Total Bilirubin 0.7 0.2 - 1.2 mg/dL    Alkaline Phosphatase 45 40 - 129 U/L    AST (SGOT) 17 5 - 32 U/L    ALT (SGPT) 13 5 - 33 U/L   Lipid Panel With LDL/HDL Ratio   Result Value Ref Range    Total Cholesterol 308 (H) 0 - 200 mg/dL    Triglycerides 69 0 - 150 mg/dL    HDL Cholesterol 110 (H) 40 - 60 mg/dL    VLDL Cholesterol 13.8 7 - 27 mg/dL    LDL Cholesterol  184 (H) 0 - 100 mg/dL    LDL/HDL Ratio 1.67    Hemoglobin A1c   Result Value Ref Range     Hemoglobin A1C 5.20 4.80 - 5.60 %   CBC w AUTO Differential   Result Value Ref Range    WBC 3.50 (L) 4.80 - 10.80 10*3/mm3    RBC 4.51 4.20 - 5.40 10*6/mm3    Hemoglobin 13.9 12.0 - 16.0 g/dL    Hematocrit 40.0 37.0 - 47.0 %    MCV 88.7 81.0 - 99.0 fL    MCH 30.8 27.0 - 31.0 pg    MCHC 34.8 31.0 - 37.0 g/dL    RDW 12.4 11.5 - 14.5 %    Platelets 173 140 - 500 10*3/mm3    Neutrophil Rel % 48.6 45.0 - 70.0 %    Lymphocyte Rel % 38.6 20.0 - 45.0 %    Monocyte Rel % 10.3 (H) 3.0 - 8.0 %    Eosinophil Rel % 1.4 0.0 - 4.0 %    Basophil Rel % 1.1 0.0 - 2.0 %    Neutrophils Absolute 1.70 1.50 - 8.30 10*3/mm3    Lymphocytes Absolute 1.35 0.60 - 4.80 10*3/mm3    Monocytes Absolute 0.36 0.00 - 1.00 10*3/mm3    Eosinophils Absolute 0.05 (L) 0.10 - 0.30 10*3/mm3    Basophils Absolute 0.04 0.00 - 0.20 10*3/mm3    Immature Granulocyte Rel % 0.0 0.0 - 0.5 %    Immature Grans Absolute 0.00 0.00 - 0.03 10*3/mm3    nRBC 0.0 0.0 - 0.0 /100 WBC       Assessment/Plan   Adali was seen today for follow-up and hypertension.    Diagnoses and all orders for this visit:    Hyperlipidemia, unspecified hyperlipidemia type  -     atorvastatin (LIPITOR) 10 MG tablet; Take 1 tablet by mouth Daily.    Essential hypertension  -     lisinopril (PRINIVIL,ZESTRIL) 40 MG tablet; Take 1 tablet by mouth Daily.    Post-menopausal  -     DEXA Bone Density Axial; Future    Osteopenia, unspecified location  -     DEXA Bone Density Axial; Future    dash diet info given      Outpatient Medications Prior to Visit   Medication Sig Dispense Refill   • aspirin 81 MG tablet Take 81 mg by mouth daily.     • CALCIUM CITRATE-VITAMIN D PO      • Cranberry-Vitamin C (AZO CRANBERRY URINARY TRACT PO) Take  by mouth 2 (Two) Times a Day.     • fluticasone (FLONASE) 50 MCG/ACT nasal spray 2 sprays into each nostril daily. 2 sprays each nostril once daily 3 each 3   • FOLIC ACID  mg.     • Ibuprofen 200 MG capsule      • Magnesium 500 MG capsule Take  by mouth.     •  vitamin B-12 (CYANOCOBALAMIN) 1000 MCG tablet Take 1,000 mcg by mouth Daily.     • lisinopril (PRINIVIL,ZESTRIL) 30 MG tablet TAKE 1 TABLET EVERY DAY 90 tablet 1     No facility-administered medications prior to visit.      New Medications Ordered This Visit   Medications   • lisinopril (PRINIVIL,ZESTRIL) 40 MG tablet     Sig: Take 1 tablet by mouth Daily.     Dispense:  90 tablet     Refill:  1   • atorvastatin (LIPITOR) 10 MG tablet     Sig: Take 1 tablet by mouth Daily.     Dispense:  90 tablet     Refill:  1       Medications Discontinued During This Encounter   Medication Reason   • lisinopril (PRINIVIL,ZESTRIL) 30 MG tablet          Return in about 1 month (around 1/13/2018) for Recheck blood pressure.

## 2017-12-13 NOTE — PATIENT INSTRUCTIONS
"DASH Eating Plan  DASH stands for \"Dietary Approaches to Stop Hypertension.\" The DASH eating plan is a healthy eating plan that has been shown to reduce high blood pressure (hypertension). Additional health benefits may include reducing the risk of type 2 diabetes mellitus, heart disease, and stroke. The DASH eating plan may also help with weight loss.  WHAT DO I NEED TO KNOW ABOUT THE DASH EATING PLAN?  For the DASH eating plan, you will follow these general guidelines:  · Choose foods with less than 150 milligrams of sodium per serving (as listed on the food label).  · Use salt-free seasonings or herbs instead of table salt or sea salt.  · Check with your health care provider or pharmacist before using salt substitutes.  · Eat lower-sodium products. These are often labeled as \"low-sodium\" or \"no salt added.\"  · Eat fresh foods. Avoid eating a lot of canned foods.  · Eat more vegetables, fruits, and low-fat dairy products.  · Choose whole grains. Look for the word \"whole\" as the first word in the ingredient list.  · Choose fish and skinless chicken or turkey more often than red meat. Limit fish, poultry, and meat to 6 oz (170 g) each day.  · Limit sweets, desserts, sugars, and sugary drinks.  · Choose heart-healthy fats.  · Eat more home-cooked food and less restaurant, buffet, and fast food.  · Limit fried foods.  · Do not jefferson foods. Cook foods using methods such as baking, boiling, grilling, and broiling instead.  · When eating at a restaurant, ask that your food be prepared with less salt, or no salt if possible.  WHAT FOODS CAN I EAT?  Seek help from a dietitian for individual calorie needs.  Grains  Whole grain or whole wheat bread. Brown rice. Whole grain or whole wheat pasta. Quinoa, bulgur, and whole grain cereals. Low-sodium cereals. Corn or whole wheat flour tortillas. Whole grain cornbread. Whole grain crackers. Low-sodium crackers.  Vegetables  Fresh or frozen vegetables (raw, steamed, roasted, or " grilled). Low-sodium or reduced-sodium tomato and vegetable juices. Low-sodium or reduced-sodium tomato sauce and paste. Low-sodium or reduced-sodium canned vegetables.   Fruits  All fresh, canned (in natural juice), or frozen fruits.  Meat and Other Protein Products  Ground beef (85% or leaner), grass-fed beef, or beef trimmed of fat. Skinless chicken or turkey. Ground chicken or turkey. Pork trimmed of fat. All fish and seafood. Eggs. Dried beans, peas, or lentils. Unsalted nuts and seeds. Unsalted canned beans.  Dairy  Low-fat dairy products, such as skim or 1% milk, 2% or reduced-fat cheeses, low-fat ricotta or cottage cheese, or plain low-fat yogurt. Low-sodium or reduced-sodium cheeses.  Fats and Oils  Tub margarines without trans fats. Light or reduced-fat mayonnaise and salad dressings (reduced sodium). Avocado. Safflower, olive, or canola oils. Natural peanut or almond butter.  Other  Unsalted popcorn and pretzels.  The items listed above may not be a complete list of recommended foods or beverages. Contact your dietitian for more options.  WHAT FOODS ARE NOT RECOMMENDED?  Grains  White bread. White pasta. White rice. Refined cornbread. Bagels and croissants. Crackers that contain trans fat.  Vegetables  Creamed or fried vegetables. Vegetables in a cheese sauce. Regular canned vegetables. Regular canned tomato sauce and paste. Regular tomato and vegetable juices.  Fruits  Canned fruit in light or heavy syrup. Fruit juice.  Meat and Other Protein Products  Fatty cuts of meat. Ribs, chicken wings, cerna, sausage, bologna, salami, chitterlings, fatback, hot dogs, bratwurst, and packaged luncheon meats. Salted nuts and seeds. Canned beans with salt.  Dairy  Whole or 2% milk, cream, half-and-half, and cream cheese. Whole-fat or sweetened yogurt. Full-fat cheeses or blue cheese. Nondairy creamers and whipped toppings. Processed cheese, cheese spreads, or cheese curds.  Condiments  Onion and garlic salt, seasoned  salt, table salt, and sea salt. Canned and packaged gravies. Worcestershire sauce. Tartar sauce. Barbecue sauce. Teriyaki sauce. Soy sauce, including reduced sodium. Steak sauce. Fish sauce. Oyster sauce. Cocktail sauce. Horseradish. Ketchup and mustard. Meat flavorings and tenderizers. Bouillon cubes. Hot sauce. Tabasco sauce. Marinades. Taco seasonings. Relishes.  Fats and Oils  Butter, stick margarine, lard, shortening, ghee, and cerna fat. Coconut, palm kernel, or palm oils. Regular salad dressings.  Other  Pickles and olives. Salted popcorn and pretzels.  The items listed above may not be a complete list of foods and beverages to avoid. Contact your dietitian for more information.  WHERE CAN I FIND MORE INFORMATION?  National [employer], [employer], [employer] [place]: www.nhlbi.nih.gov/health/health-topics/topics/dash/     This information is not intended to replace advice given to you by your health care provider. Make sure you discuss any questions you have with your health care provider.     Document Released: 12/06/2012 Document Revised: 04/10/2017 Document Reviewed: 10/22/2014  Elsevier Interactive Patient Education ©2017 [employer]

## 2018-01-02 ENCOUNTER — APPOINTMENT (OUTPATIENT)
Dept: BONE DENSITY | Facility: HOSPITAL | Age: 73
End: 2018-01-02
Attending: INTERNAL MEDICINE

## 2018-01-08 ENCOUNTER — APPOINTMENT (OUTPATIENT)
Dept: BONE DENSITY | Facility: HOSPITAL | Age: 73
End: 2018-01-08
Attending: INTERNAL MEDICINE

## 2018-01-08 DIAGNOSIS — M85.80 OSTEOPENIA, UNSPECIFIED LOCATION: ICD-10-CM

## 2018-01-08 DIAGNOSIS — Z78.0 POST-MENOPAUSAL: ICD-10-CM

## 2018-01-08 PROCEDURE — 77080 DXA BONE DENSITY AXIAL: CPT

## 2018-01-15 RX ORDER — ALENDRONATE SODIUM 70 MG/1
70 TABLET ORAL
Qty: 4 TABLET | Refills: 0 | Status: SHIPPED | OUTPATIENT
Start: 2018-01-15 | End: 2018-02-14

## 2018-02-02 ENCOUNTER — OFFICE VISIT (OUTPATIENT)
Dept: INTERNAL MEDICINE | Facility: CLINIC | Age: 73
End: 2018-02-02

## 2018-02-02 ENCOUNTER — TELEPHONE (OUTPATIENT)
Dept: INTERNAL MEDICINE | Facility: CLINIC | Age: 73
End: 2018-02-02

## 2018-02-02 VITALS
OXYGEN SATURATION: 99 % | DIASTOLIC BLOOD PRESSURE: 95 MMHG | HEIGHT: 60 IN | BODY MASS INDEX: 25.95 KG/M2 | HEART RATE: 109 BPM | WEIGHT: 132.2 LBS | SYSTOLIC BLOOD PRESSURE: 155 MMHG

## 2018-02-02 DIAGNOSIS — I10 ESSENTIAL HYPERTENSION: ICD-10-CM

## 2018-02-02 DIAGNOSIS — M85.852 OSTEOPENIA OF LEFT HIP: Primary | ICD-10-CM

## 2018-02-02 DIAGNOSIS — E78.5 HYPERLIPIDEMIA, UNSPECIFIED HYPERLIPIDEMIA TYPE: ICD-10-CM

## 2018-02-02 PROCEDURE — 99214 OFFICE O/P EST MOD 30 MIN: CPT | Performed by: INTERNAL MEDICINE

## 2018-02-02 RX ORDER — LANOLIN ALCOHOL/MO/W.PET/CERES
50 CREAM (GRAM) TOPICAL DAILY
COMMUNITY
End: 2021-03-02

## 2018-02-02 NOTE — PROGRESS NOTES
Subjective     Adali Hankins is a 72 y.o. female, who presents with a chief complaint of   Chief Complaint   Patient presents with   • Medication Problem     Started taking Alendronate and now havving side effects from it       HPI   Pt started fosamax and has been having pain all over and in hands.      HTN - pt brought her bp log with her.  Most bp's in 120-130 range.  No ha/dizziness.  No cough with lisinopril    hld - pt started statin in dec.  She denies cramps or myalgias    The following portions of the patient's history were reviewed and updated as appropriate: allergies, current medications, past family history, past medical history, past social history, past surgical history and problem list.    Allergies: Review of patient's allergies indicates no known allergies.    Review of Systems   Constitutional: Negative.    HENT: Negative.    Eyes: Negative.    Respiratory: Negative.    Cardiovascular: Negative.    Gastrointestinal: Negative.    Endocrine: Negative.    Genitourinary: Negative.    Musculoskeletal:        Bone pain   Skin: Negative.    Allergic/Immunologic: Negative.    Neurological: Negative.    Hematological: Negative.    Psychiatric/Behavioral: Negative.    All other systems reviewed and are negative.      Objective     Wt Readings from Last 3 Encounters:   02/02/18 60 kg (132 lb 3.2 oz)   12/13/17 60.1 kg (132 lb 6.4 oz)   06/23/17 61.7 kg (136 lb)     Temp Readings from Last 3 Encounters:   06/23/17 97.6 °F (36.4 °C) (Oral)   04/04/17 97.7 °F (36.5 °C) (Oral)   03/06/17 97.8 °F (36.6 °C) (Oral)     BP Readings from Last 3 Encounters:   02/02/18 155/95   12/13/17 160/100   06/23/17 158/90     Pulse Readings from Last 3 Encounters:   02/02/18 109   12/13/17 90   06/23/17 87     Body mass index is 25.82 kg/(m^2).  SpO2 Readings from Last 3 Encounters:   02/02/18 99%   12/13/17 99%   06/23/17 99%       Physical Exam   Constitutional: She is oriented to person, place, and time. She appears  well-developed and well-nourished. No distress.   HENT:   Head: Normocephalic and atraumatic.   Right Ear: External ear normal.   Left Ear: External ear normal.   Nose: Nose normal.   Mouth/Throat: Oropharynx is clear and moist.   Eyes: Conjunctivae and EOM are normal. Pupils are equal, round, and reactive to light.   Neck: Normal range of motion. Neck supple.   Cardiovascular: Normal rate, regular rhythm, normal heart sounds and intact distal pulses.    Pulmonary/Chest: Effort normal and breath sounds normal. No respiratory distress. She has no wheezes.   Musculoskeletal: Normal range of motion.   Normal gait   Neurological: She is alert and oriented to person, place, and time.   Skin: Skin is warm and dry.   Psychiatric: She has a normal mood and affect. Her behavior is normal. Judgment and thought content normal.   Nursing note and vitals reviewed.      Results for orders placed or performed in visit on 12/06/17   Comprehensive metabolic panel   Result Value Ref Range    Glucose 93 65 - 99 mg/dL    BUN 10 8 - 23 mg/dL    Creatinine 0.81 0.57 - 1.00 mg/dL    eGFR Non African Am 69 >60 mL/min/1.73    eGFR African Am 84 >60 mL/min/1.73    BUN/Creatinine Ratio 12.3 7.0 - 25.0    Sodium 140 136 - 145 mmol/L    Potassium 4.8 3.5 - 5.2 mmol/L    Chloride 100 98 - 107 mmol/L    Total CO2 29.9 (H) 22.0 - 29.0 mmol/L    Calcium 9.6 8.8 - 10.5 mg/dL    Total Protein 7.0 6.0 - 8.5 g/dL    Albumin 4.60 3.50 - 5.20 g/dL    Globulin 2.4 gm/dL    A/G Ratio 1.9 g/dL    Total Bilirubin 0.7 0.2 - 1.2 mg/dL    Alkaline Phosphatase 45 40 - 129 U/L    AST (SGOT) 17 5 - 32 U/L    ALT (SGPT) 13 5 - 33 U/L   Lipid Panel With LDL/HDL Ratio   Result Value Ref Range    Total Cholesterol 308 (H) 0 - 200 mg/dL    Triglycerides 69 0 - 150 mg/dL    HDL Cholesterol 110 (H) 40 - 60 mg/dL    VLDL Cholesterol 13.8 7 - 27 mg/dL    LDL Cholesterol  184 (H) 0 - 100 mg/dL    LDL/HDL Ratio 1.67    Hemoglobin A1c   Result Value Ref Range    Hemoglobin  A1C 5.20 4.80 - 5.60 %   CBC w AUTO Differential   Result Value Ref Range    WBC 3.50 (L) 4.80 - 10.80 10*3/mm3    RBC 4.51 4.20 - 5.40 10*6/mm3    Hemoglobin 13.9 12.0 - 16.0 g/dL    Hematocrit 40.0 37.0 - 47.0 %    MCV 88.7 81.0 - 99.0 fL    MCH 30.8 27.0 - 31.0 pg    MCHC 34.8 31.0 - 37.0 g/dL    RDW 12.4 11.5 - 14.5 %    Platelets 173 140 - 500 10*3/mm3    Neutrophil Rel % 48.6 45.0 - 70.0 %    Lymphocyte Rel % 38.6 20.0 - 45.0 %    Monocyte Rel % 10.3 (H) 3.0 - 8.0 %    Eosinophil Rel % 1.4 0.0 - 4.0 %    Basophil Rel % 1.1 0.0 - 2.0 %    Neutrophils Absolute 1.70 1.50 - 8.30 10*3/mm3    Lymphocytes Absolute 1.35 0.60 - 4.80 10*3/mm3    Monocytes Absolute 0.36 0.00 - 1.00 10*3/mm3    Eosinophils Absolute 0.05 (L) 0.10 - 0.30 10*3/mm3    Basophils Absolute 0.04 0.00 - 0.20 10*3/mm3    Immature Granulocyte Rel % 0.0 0.0 - 0.5 %    Immature Grans Absolute 0.00 0.00 - 0.03 10*3/mm3    nRBC 0.0 0.0 - 0.0 /100 WBC       Assessment/Plan   Adali was seen today for medication problem.    Diagnoses and all orders for this visit:    Osteopenia of left hip - Increase ca to 1200-1500mg/day and vit d to 800IU/day.  Encouraged at least 30 min of weight bearing exercise daily.  Stop fosamax bc of pain.    Essential hypertension - Cont lisinopril.      Outpatient Medications Prior to Visit   Medication Sig Dispense Refill   • aspirin 81 MG tablet Take 81 mg by mouth daily.     • atorvastatin (LIPITOR) 10 MG tablet Take 1 tablet by mouth Daily. 90 tablet 1   • CALCIUM CITRATE-VITAMIN D PO      • Cranberry-Vitamin C (AZO CRANBERRY URINARY TRACT PO) Take  by mouth 2 (Two) Times a Day.     • fluticasone (FLONASE) 50 MCG/ACT nasal spray 2 sprays into each nostril daily. 2 sprays each nostril once daily 3 each 3   • FOLIC ACID  mg.     • Ibuprofen 200 MG capsule      • lisinopril (PRINIVIL,ZESTRIL) 40 MG tablet Take 1 tablet by mouth Daily. 90 tablet 1   • Magnesium 500 MG capsule Take  by mouth.     • vitamin B-12  (CYANOCOBALAMIN) 1000 MCG tablet Take 1,000 mcg by mouth Daily.     • alendronate (FOSAMAX) 70 MG tablet Take 1 tablet by mouth Every 7 (Seven) Days for 30 days. 4 tablet 0     No facility-administered medications prior to visit.      No orders of the defined types were placed in this encounter.    [unfilled]  There are no discontinued medications.      Return in about 6 weeks (around 3/16/2018) for labs.

## 2018-02-02 NOTE — TELEPHONE ENCOUNTER
Per Dr. Diaz, not usual SE from this medication.  Needs to be seen.  Scheduled today @ 11:00.    ----- Message from Bambi Hart MA sent at 2/2/2018  7:51 AM EST -----      ----- Message -----     From: Nilsa Paul     Sent: 2/1/2018  10:44 AM       To: [facility] Clinical Pool    PATIENT IS NOW TAKING ALENDRONATE SODIUM AND IS HAVING ISSUES WITH HER THUMBS NOT MOVING RIGHT AND A FEW OTHER ISSUES WITH ARMS AND HANDS.  SHE'S ON WEEK 2 SO WHAT SHOULD SHE DO AT THIS POINT?    [phone]

## 2018-03-08 DIAGNOSIS — I10 ESSENTIAL HYPERTENSION: ICD-10-CM

## 2018-03-08 DIAGNOSIS — R73.9 HYPERGLYCEMIA: ICD-10-CM

## 2018-03-08 DIAGNOSIS — E78.5 HYPERLIPIDEMIA, UNSPECIFIED HYPERLIPIDEMIA TYPE: Primary | ICD-10-CM

## 2018-03-13 ENCOUNTER — RESULTS ENCOUNTER (OUTPATIENT)
Dept: INTERNAL MEDICINE | Facility: CLINIC | Age: 73
End: 2018-03-13

## 2018-03-13 DIAGNOSIS — E78.5 HYPERLIPIDEMIA, UNSPECIFIED HYPERLIPIDEMIA TYPE: ICD-10-CM

## 2018-03-13 DIAGNOSIS — R73.9 HYPERGLYCEMIA: ICD-10-CM

## 2018-03-13 DIAGNOSIS — I10 ESSENTIAL HYPERTENSION: ICD-10-CM

## 2018-03-16 LAB
ALBUMIN SERPL-MCNC: 4.6 G/DL (ref 3.5–5.2)
ALBUMIN/GLOB SERPL: 1.8 G/DL
ALP SERPL-CCNC: 41 U/L (ref 40–129)
ALT SERPL-CCNC: 19 U/L (ref 5–33)
AST SERPL-CCNC: 23 U/L (ref 5–32)
BASOPHILS # BLD AUTO: 0.03 10*3/MM3 (ref 0–0.2)
BASOPHILS NFR BLD AUTO: 0.8 % (ref 0–2)
BILIRUB SERPL-MCNC: 0.9 MG/DL (ref 0.2–1.2)
BUN SERPL-MCNC: 11 MG/DL (ref 8–23)
BUN/CREAT SERPL: 14.1 (ref 7–25)
CALCIUM SERPL-MCNC: 9.8 MG/DL (ref 8.8–10.5)
CHLORIDE SERPL-SCNC: 99 MMOL/L (ref 98–107)
CHOLEST SERPL-MCNC: 211 MG/DL (ref 0–200)
CO2 SERPL-SCNC: 28.5 MMOL/L (ref 22–29)
CREAT SERPL-MCNC: 0.78 MG/DL (ref 0.57–1)
EOSINOPHIL # BLD AUTO: 0.03 10*3/MM3 (ref 0.1–0.3)
EOSINOPHIL NFR BLD AUTO: 0.8 % (ref 0–4)
ERYTHROCYTE [DISTWIDTH] IN BLOOD BY AUTOMATED COUNT: 13 % (ref 11.5–14.5)
GFR SERPLBLD CREATININE-BSD FMLA CKD-EPI: 72 ML/MIN/1.73
GFR SERPLBLD CREATININE-BSD FMLA CKD-EPI: 88 ML/MIN/1.73
GLOBULIN SER CALC-MCNC: 2.5 GM/DL
GLUCOSE SERPL-MCNC: 102 MG/DL (ref 65–99)
HBA1C MFR BLD: 5.2 % (ref 4.8–5.6)
HCT VFR BLD AUTO: 40.3 % (ref 37–47)
HDLC SERPL-MCNC: 108 MG/DL (ref 40–60)
HGB BLD-MCNC: 13.7 G/DL (ref 12–16)
IMM GRANULOCYTES # BLD: 0 10*3/MM3 (ref 0–0.03)
IMM GRANULOCYTES NFR BLD: 0 % (ref 0–0.5)
LDLC SERPL CALC-MCNC: 87 MG/DL (ref 0–100)
LDLC/HDLC SERPL: 0.8 {RATIO}
LYMPHOCYTES # BLD AUTO: 1.37 10*3/MM3 (ref 0.6–4.8)
LYMPHOCYTES NFR BLD AUTO: 37.8 % (ref 20–45)
MCH RBC QN AUTO: 30.9 PG (ref 27–31)
MCHC RBC AUTO-ENTMCNC: 34 G/DL (ref 31–37)
MCV RBC AUTO: 90.8 FL (ref 81–99)
MONOCYTES # BLD AUTO: 0.4 10*3/MM3 (ref 0–1)
MONOCYTES NFR BLD AUTO: 11 % (ref 3–8)
NEUTROPHILS # BLD AUTO: 1.79 10*3/MM3 (ref 1.5–8.3)
NEUTROPHILS NFR BLD AUTO: 49.6 % (ref 45–70)
NRBC BLD AUTO-RTO: 0 /100 WBC (ref 0–0)
PLATELET # BLD AUTO: 150 10*3/MM3 (ref 140–500)
POTASSIUM SERPL-SCNC: 4.9 MMOL/L (ref 3.5–5.2)
PROT SERPL-MCNC: 7.1 G/DL (ref 6–8.5)
RBC # BLD AUTO: 4.44 10*6/MM3 (ref 4.2–5.4)
SODIUM SERPL-SCNC: 140 MMOL/L (ref 136–145)
TRIGL SERPL-MCNC: 82 MG/DL (ref 0–150)
VLDLC SERPL CALC-MCNC: 16.4 MG/DL (ref 7–27)
WBC # BLD AUTO: 3.62 10*3/MM3 (ref 4.8–10.8)

## 2018-05-31 DIAGNOSIS — E78.5 HYPERLIPIDEMIA, UNSPECIFIED HYPERLIPIDEMIA TYPE: ICD-10-CM

## 2018-05-31 RX ORDER — ATORVASTATIN CALCIUM 10 MG/1
TABLET, FILM COATED ORAL
Qty: 90 TABLET | Refills: 1 | Status: SHIPPED | OUTPATIENT
Start: 2018-05-31 | End: 2018-12-06 | Stop reason: SDUPTHER

## 2018-06-06 DIAGNOSIS — I10 ESSENTIAL HYPERTENSION: ICD-10-CM

## 2018-06-06 RX ORDER — LISINOPRIL 40 MG/1
TABLET ORAL
Qty: 90 TABLET | Refills: 1 | Status: SHIPPED | OUTPATIENT
Start: 2018-06-06 | End: 2018-12-06 | Stop reason: SDUPTHER

## 2018-12-06 DIAGNOSIS — E78.5 HYPERLIPIDEMIA, UNSPECIFIED HYPERLIPIDEMIA TYPE: ICD-10-CM

## 2018-12-06 DIAGNOSIS — I10 ESSENTIAL HYPERTENSION: ICD-10-CM

## 2018-12-07 RX ORDER — ATORVASTATIN CALCIUM 10 MG/1
TABLET, FILM COATED ORAL
Qty: 90 TABLET | Refills: 1 | Status: SHIPPED | OUTPATIENT
Start: 2018-12-07 | End: 2019-03-18 | Stop reason: SDUPTHER

## 2018-12-07 RX ORDER — LISINOPRIL 40 MG/1
TABLET ORAL
Qty: 90 TABLET | Refills: 1 | Status: SHIPPED | OUTPATIENT
Start: 2018-12-07 | End: 2019-03-18 | Stop reason: SDUPTHER

## 2018-12-18 ENCOUNTER — OFFICE VISIT (OUTPATIENT)
Dept: INTERNAL MEDICINE | Facility: CLINIC | Age: 73
End: 2018-12-18

## 2018-12-18 VITALS
SYSTOLIC BLOOD PRESSURE: 180 MMHG | BODY MASS INDEX: 25.19 KG/M2 | HEART RATE: 97 BPM | OXYGEN SATURATION: 99 % | DIASTOLIC BLOOD PRESSURE: 106 MMHG | TEMPERATURE: 98.8 F | RESPIRATION RATE: 20 BRPM | WEIGHT: 129 LBS

## 2018-12-18 DIAGNOSIS — H61.23 HEARING LOSS DUE TO CERUMEN IMPACTION, BILATERAL: Primary | ICD-10-CM

## 2018-12-18 DIAGNOSIS — J02.9 SORE THROAT: ICD-10-CM

## 2018-12-18 DIAGNOSIS — J06.9 ACUTE URI: ICD-10-CM

## 2018-12-18 DIAGNOSIS — I10 ESSENTIAL HYPERTENSION: ICD-10-CM

## 2018-12-18 PROBLEM — S99.929A INJURY OF NAIL BED OF TOE: Status: ACTIVE | Noted: 2018-12-18

## 2018-12-18 LAB
EXPIRATION DATE: NORMAL
INTERNAL CONTROL: NORMAL
Lab: NORMAL
S PYO AG THROAT QL: NEGATIVE

## 2018-12-18 PROCEDURE — 99214 OFFICE O/P EST MOD 30 MIN: CPT | Performed by: INTERNAL MEDICINE

## 2018-12-18 PROCEDURE — 87880 STREP A ASSAY W/OPTIC: CPT | Performed by: INTERNAL MEDICINE

## 2018-12-18 NOTE — PROGRESS NOTES
Adali Hankins is a 73 y.o. female, who presents with a chief complaint of   Chief Complaint   Patient presents with   • Sore Throat   • Toe Pain       HPI     74 yo female with pmhx HTN   She did take an otc cough medication, which likely raised her blood pressure. She did bring a log which is very well controlled although her last 10/29/18. She stopped because she was feeling good.    Started with white spot on her throat last few days.  She has a  who has been sick.       Also stubbed R great toe on furniture, nail black and falling off.     The following portions of the patient's history were reviewed and updated as appropriate: allergies, current medications, past family history, past medical history, past social history, past surgical history and problem list.    Allergies: Patient has no known allergies.    Current Outpatient Medications:   •  aspirin 81 MG tablet, Take 81 mg by mouth daily., Disp: , Rfl:   •  atorvastatin (LIPITOR) 10 MG tablet, TAKE 1 TABLET EVERY DAY, Disp: 90 tablet, Rfl: 1  •  CALCIUM CITRATE-VITAMIN D PO, , Disp: , Rfl:   •  Cranberry-Vitamin C (AZO CRANBERRY URINARY TRACT PO), Take  by mouth 2 (Two) Times a Day., Disp: , Rfl:   •  fluticasone (FLONASE) 50 MCG/ACT nasal spray, 2 sprays into each nostril daily. 2 sprays each nostril once daily, Disp: 3 each, Rfl: 3  •  FOLIC ACID PO, 800 mg., Disp: , Rfl:   •  Ibuprofen 200 MG capsule, , Disp: , Rfl:   •  lisinopril (PRINIVIL,ZESTRIL) 40 MG tablet, TAKE 1 TABLET EVERY DAY, Disp: 90 tablet, Rfl: 1  •  Magnesium 500 MG capsule, Take  by mouth., Disp: , Rfl:   •  vitamin B-12 (CYANOCOBALAMIN) 1000 MCG tablet, Take 1,000 mcg by mouth Daily., Disp: , Rfl:   •  vitamin B-6 (PYRIDOXINE) 50 MG tablet, Take 50 mg by mouth Daily., Disp: , Rfl:   There are no discontinued medications.    Review of Systems   Constitutional: Negative.  Negative for appetite change and fever.   HENT: Positive for congestion.         Cant hear R ear   Eyes:  Negative.    Respiratory: Negative.    Cardiovascular: Negative.    Genitourinary: Negative.    Skin:        Nail color change             BP (!) 180/106   Pulse 97   Temp 98.8 °F (37.1 °C)   Resp 20   Wt 58.5 kg (129 lb)   SpO2 99%   BMI 25.19 kg/m²       Physical Exam   Constitutional: She is oriented to person, place, and time. She appears well-developed and well-nourished.   HENT:   Head: Normocephalic and atraumatic.   Right Ear: External ear normal.   Left Ear: External ear normal.   One apthi appearing sore R palate. No tonsillar erythema or exudate   Eyes: EOM are normal. Pupils are equal, round, and reactive to light. Right eye exhibits no discharge.   Neck: Normal range of motion. Neck supple. No thyromegaly present.   Cardiovascular: Normal rate, regular rhythm and normal heart sounds.   No murmur heard.  Pulmonary/Chest: Effort normal and breath sounds normal.   Abdominal: Soft. She exhibits no distension.   Musculoskeletal: She exhibits no edema.   Neurological: She is alert and oriented to person, place, and time.   Skin: Skin is warm and dry. Capillary refill takes less than 2 seconds.   Psychiatric: She has a normal mood and affect. Her behavior is normal.   Vitals reviewed.      Lab Results (most recent)     None          Results for orders placed or performed in visit on 12/18/18   POCT rapid strep A   Result Value Ref Range    Rapid Strep A Screen Negative Negative, VALID, INVALID, Not Performed    Internal Control Passed Passed    Lot Number [id number]     Expiration Date 3,212,020            Adali was seen today for sore throat and toe pain.    Diagnoses and all orders for this visit:    Hearing loss due to cerumen impaction, bilateral    Essential hypertension    Sore throat  -     POCT rapid strep A    Acute URI      Viral herpetic vs aphthous lesion  Call or return 2 weeks   Lysine could help with her sore  Cold water rinses with salt.   Cerumen removed, elephant lavage.   Return if  symptoms worsen or fail to improve.    Lui Taylor MD  12/18/2018

## 2018-12-27 ENCOUNTER — TRANSCRIBE ORDERS (OUTPATIENT)
Dept: INTERNAL MEDICINE | Facility: CLINIC | Age: 73
End: 2018-12-27

## 2018-12-27 DIAGNOSIS — Z12.39 BREAST SCREENING: Primary | ICD-10-CM

## 2019-01-09 ENCOUNTER — HOSPITAL ENCOUNTER (OUTPATIENT)
Dept: MAMMOGRAPHY | Facility: HOSPITAL | Age: 74
Discharge: HOME | End: 2019-01-09
Attending: INTERNAL MEDICINE | Admitting: INTERNAL MEDICINE

## 2019-01-09 DIAGNOSIS — Z12.39 BREAST SCREENING: ICD-10-CM

## 2019-01-09 PROCEDURE — 77063 BREAST TOMOSYNTHESIS BI: CPT

## 2019-01-09 PROCEDURE — 77067 SCR MAMMO BI INCL CAD: CPT

## 2019-01-10 ENCOUNTER — TELEPHONE (OUTPATIENT)
Dept: INTERNAL MEDICINE | Facility: CLINIC | Age: 74
End: 2019-01-10

## 2019-01-10 NOTE — TELEPHONE ENCOUNTER
Patient is aware      ----- Message from Ana Luisa Diaz MD sent at 1/9/2019  5:05 PM EST -----  Mammogram normal.  Repeat 1 year

## 2019-03-18 DIAGNOSIS — E78.5 HYPERLIPIDEMIA, UNSPECIFIED HYPERLIPIDEMIA TYPE: ICD-10-CM

## 2019-03-18 DIAGNOSIS — I10 ESSENTIAL HYPERTENSION: ICD-10-CM

## 2019-03-18 RX ORDER — LISINOPRIL 40 MG/1
40 TABLET ORAL DAILY
Qty: 10 TABLET | Refills: 0 | Status: SHIPPED | OUTPATIENT
Start: 2019-03-18 | End: 2019-05-20 | Stop reason: SDUPTHER

## 2019-03-18 RX ORDER — ATORVASTATIN CALCIUM 10 MG/1
10 TABLET, FILM COATED ORAL DAILY
Qty: 10 TABLET | Refills: 0 | Status: SHIPPED | OUTPATIENT
Start: 2019-03-18 | End: 2019-05-20 | Stop reason: SDUPTHER

## 2019-05-20 DIAGNOSIS — E78.5 HYPERLIPIDEMIA, UNSPECIFIED HYPERLIPIDEMIA TYPE: ICD-10-CM

## 2019-05-20 DIAGNOSIS — I10 ESSENTIAL HYPERTENSION: ICD-10-CM

## 2019-05-20 RX ORDER — LISINOPRIL 40 MG/1
TABLET ORAL
Qty: 90 TABLET | Refills: 1 | Status: SHIPPED | OUTPATIENT
Start: 2019-05-20 | End: 2019-12-16 | Stop reason: SDUPTHER

## 2019-05-20 RX ORDER — ATORVASTATIN CALCIUM 10 MG/1
TABLET, FILM COATED ORAL
Qty: 90 TABLET | Refills: 1 | Status: SHIPPED | OUTPATIENT
Start: 2019-05-20 | End: 2020-02-28 | Stop reason: ALTCHOICE

## 2019-10-24 ENCOUNTER — TELEPHONE (OUTPATIENT)
Dept: CASE MANAGEMENT | Facility: OTHER | Age: 74
End: 2019-10-24

## 2019-10-24 NOTE — TELEPHONE ENCOUNTER
Called pt to schedule Medicare annual wellness visit. No answer, left voicemail message requesting call back to RN-CC at [phone].

## 2019-12-16 ENCOUNTER — TELEPHONE (OUTPATIENT)
Dept: INTERNAL MEDICINE | Facility: CLINIC | Age: 74
End: 2019-12-16

## 2019-12-16 DIAGNOSIS — I10 ESSENTIAL HYPERTENSION: ICD-10-CM

## 2019-12-16 RX ORDER — LISINOPRIL 40 MG/1
40 TABLET ORAL DAILY
Qty: 30 TABLET | Refills: 0 | Status: SHIPPED | OUTPATIENT
Start: 2019-12-16 | End: 2020-01-20

## 2019-12-16 NOTE — TELEPHONE ENCOUNTER
Pt accidentally called in the wrong medication to her mail in pharmacy. She needs her lisinopril 40 mg tab called in to [facility] in [place]. She only has 2 pills left.   Sees aleshia. Call back is [phone].

## 2020-01-13 ENCOUNTER — OFFICE VISIT (OUTPATIENT)
Dept: INTERNAL MEDICINE | Facility: CLINIC | Age: 75
End: 2020-01-13

## 2020-01-13 VITALS
BODY MASS INDEX: 23.63 KG/M2 | OXYGEN SATURATION: 98 % | HEART RATE: 121 BPM | SYSTOLIC BLOOD PRESSURE: 164 MMHG | RESPIRATION RATE: 18 BRPM | WEIGHT: 121 LBS | DIASTOLIC BLOOD PRESSURE: 104 MMHG

## 2020-01-13 DIAGNOSIS — E78.5 HYPERLIPIDEMIA, UNSPECIFIED HYPERLIPIDEMIA TYPE: ICD-10-CM

## 2020-01-13 DIAGNOSIS — Z78.0 POST-MENOPAUSAL: ICD-10-CM

## 2020-01-13 DIAGNOSIS — Z12.31 BREAST CANCER SCREENING BY MAMMOGRAM: ICD-10-CM

## 2020-01-13 DIAGNOSIS — J30.89 OTHER ALLERGIC RHINITIS: ICD-10-CM

## 2020-01-13 DIAGNOSIS — R73.9 HYPERGLYCEMIA: ICD-10-CM

## 2020-01-13 DIAGNOSIS — Z00.00 MEDICARE ANNUAL WELLNESS VISIT, INITIAL: Primary | ICD-10-CM

## 2020-01-13 DIAGNOSIS — J34.89 SINUS PRESSURE: ICD-10-CM

## 2020-01-13 DIAGNOSIS — I10 ESSENTIAL HYPERTENSION: ICD-10-CM

## 2020-01-13 PROCEDURE — 99214 OFFICE O/P EST MOD 30 MIN: CPT | Performed by: INTERNAL MEDICINE

## 2020-01-13 PROCEDURE — 99397 PER PM REEVAL EST PAT 65+ YR: CPT | Performed by: INTERNAL MEDICINE

## 2020-01-13 PROCEDURE — 96160 PT-FOCUSED HLTH RISK ASSMT: CPT | Performed by: INTERNAL MEDICINE

## 2020-01-13 PROCEDURE — G0439 PPPS, SUBSEQ VISIT: HCPCS | Performed by: INTERNAL MEDICINE

## 2020-01-13 RX ORDER — HYDROCHLOROTHIAZIDE 25 MG/1
25 TABLET ORAL DAILY
Qty: 90 TABLET | Refills: 0 | Status: SHIPPED | OUTPATIENT
Start: 2020-01-13 | End: 2020-03-12 | Stop reason: SDUPTHER

## 2020-01-13 RX ORDER — FLUTICASONE PROPIONATE 50 MCG
2 SPRAY, SUSPENSION (ML) NASAL DAILY
Qty: 3 BOTTLE | Refills: 1 | Status: SHIPPED | OUTPATIENT
Start: 2020-01-13 | End: 2020-07-07

## 2020-01-13 NOTE — PROGRESS NOTES
Adali Hankins is a 74 y.o. female, who presents with a chief complaint of   Chief Complaint   Patient presents with   • Hypertension   medicare exam    HPI   Pt here for bp check and also c/o sinus headache x 1 week.  She has been doing saline flushes which helps some.  No fever.  + PND   Sx seem to be worse with the recent rain and weather changes.  She did walk in the rain a couple of days.  She has flonase at home but hasnt been using it.     HTN - bp still elevated.  No ha/dizziness.  She is on lisinopril.  No cough.  She says bp's at home are 130's/80's.      HDL - on statin.  Last labs 3/2018.  No cramps/myalgias.       The following portions of the patient's history were reviewed and updated as appropriate: allergies, current medications, past family history, past medical history, past social history, past surgical history and problem list.    Allergies: Patient has no known allergies.    Review of Systems   Constitutional: Negative.    HENT: Positive for congestion and sinus pressure.    Eyes: Negative.    Respiratory: Negative.    Cardiovascular: Negative.    Gastrointestinal: Negative.    Endocrine: Negative.    Genitourinary: Negative.    Musculoskeletal: Negative.    Skin: Negative.    Allergic/Immunologic: Negative.    Neurological: Negative.    Hematological: Negative.    Psychiatric/Behavioral: Negative.    All other systems reviewed and are negative.            Wt Readings from Last 3 Encounters:   01/13/20 54.9 kg (121 lb)   12/18/18 58.5 kg (129 lb)   02/02/18 60 kg (132 lb 3.2 oz)     Temp Readings from Last 3 Encounters:   12/18/18 98.8 °F (37.1 °C)   06/23/17 97.6 °F (36.4 °C) (Oral)   04/04/17 97.7 °F (36.5 °C) (Oral)     BP Readings from Last 3 Encounters:   01/13/20 (!) 164/104   12/18/18 (!) 180/106   02/02/18 155/95     Pulse Readings from Last 3 Encounters:   01/13/20 (!) 121   12/18/18 97   02/02/18 109     Body mass index is 23.63 kg/m².  @LASTSAO2(3)@    Physical Exam    Constitutional: She is oriented to person, place, and time. She appears well-developed and well-nourished. No distress.   HENT:   Head: Normocephalic and atraumatic.   Right Ear: External ear normal.   Left Ear: External ear normal.   Nose: Nose normal.   Mouth/Throat: Oropharynx is clear and moist.   Bilateral cerumen impaction   Eyes: Pupils are equal, round, and reactive to light. Conjunctivae and EOM are normal.   Neck: Normal range of motion. Neck supple.   Cardiovascular: Normal rate, regular rhythm, normal heart sounds and intact distal pulses.   Pulmonary/Chest: Effort normal and breath sounds normal. No respiratory distress. She has no wheezes.   Musculoskeletal: Normal range of motion.   Normal gait   Neurological: She is alert and oriented to person, place, and time.   Skin: Skin is warm and dry.   Psychiatric: She has a normal mood and affect. Her behavior is normal. Judgment and thought content normal.   Nursing note and vitals reviewed.      Results for orders placed or performed in visit on 12/18/18   POCT rapid strep A   Result Value Ref Range    Rapid Strep A Screen Negative Negative, VALID, INVALID, Not Performed    Internal Control Passed Passed    Lot Number [id number]     Expiration Date 3,212,020            Adali was seen today for hypertension.    Diagnoses and all orders for this visit:    Medicare annual wellness visit, initial    Hyperglycemia  -     Comprehensive Metabolic Panel  -     CBC & Differential  -     T4, Free  -     TSH  -     Hemoglobin A1c  -     NMR LipoProfile    Essential hypertension  -     Comprehensive Metabolic Panel  -     CBC & Differential  -     T4, Free  -     TSH  -     NMR LipoProfile  -     hydroCHLOROthiazide (HYDRODIURIL) 25 MG tablet; Take 1 tablet by mouth Daily.    Hyperlipidemia, unspecified hyperlipidemia type  -     Comprehensive Metabolic Panel  -     CBC & Differential  -     T4, Free  -     TSH  -     Hemoglobin A1c  -     NMR LipoProfile    Breast  cancer screening by mammogram  -     Mammo Screening Bilateral With CAD; Future    Post-menopausal  -     DEXA Bone Density Axial; Future    Sinus pressure  -     fluticasone (FLONASE) 50 MCG/ACT nasal spray; 2 sprays into the nostril(s) as directed by provider Daily. 2 sprays each nostril once daily    Other allergic rhinitis  -     fluticasone (FLONASE) 50 MCG/ACT nasal spray; 2 sprays into the nostril(s) as directed by provider Daily. 2 sprays each nostril once daily          Outpatient Medications Prior to Visit   Medication Sig Dispense Refill   • aspirin 81 MG tablet Take 81 mg by mouth daily.     • atorvastatin (LIPITOR) 10 MG tablet TAKE 1 TABLET EVERY DAY 90 tablet 1   • CALCIUM CITRATE-VITAMIN D PO      • Cranberry-Vitamin C (AZO CRANBERRY URINARY TRACT PO) Take  by mouth 2 (Two) Times a Day.     • FOLIC ACID  mg.     • Ibuprofen 200 MG capsule      • lisinopril (PRINIVIL,ZESTRIL) 40 MG tablet Take 1 tablet by mouth Daily. LAST REFILL. NEEDS APT WITH DR JOHNSON 30 tablet 0   • Magnesium 500 MG capsule Take  by mouth.     • vitamin B-12 (CYANOCOBALAMIN) 1000 MCG tablet Take 1,000 mcg by mouth Daily.     • vitamin B-6 (PYRIDOXINE) 50 MG tablet Take 50 mg by mouth Daily.     • fluticasone (FLONASE) 50 MCG/ACT nasal spray 2 sprays into each nostril daily. 2 sprays each nostril once daily 3 each 3     No facility-administered medications prior to visit.      New Medications Ordered This Visit   Medications   • hydroCHLOROthiazide (HYDRODIURIL) 25 MG tablet     Sig: Take 1 tablet by mouth Daily.     Dispense:  90 tablet     Refill:  0   • fluticasone (FLONASE) 50 MCG/ACT nasal spray     Si sprays into the nostril(s) as directed by provider Daily. 2 sprays each nostril once daily     Dispense:  3 bottle     Refill:  1     [unfilled]  Medications Discontinued During This Encounter   Medication Reason   • fluticasone (FLONASE) 50 MCG/ACT nasal spray Reorder         Return in about 3 months (around  4/13/2020) for Recheck.

## 2020-01-13 NOTE — PROGRESS NOTES
The ABCs of the Annual Wellness Visit  Initial Medicare Wellness Visit    Chief Complaint   Patient presents with   • Hypertension       Subjective   History of Present Illness:  Adali Hankins is a 74 y.o. female who presents for an Initial Medicare Wellness Visit.    HEALTH RISK ASSESSMENT    Recent Hospitalizations:  No hospitalization(s) within the last year.    Current Medical Providers:  Patient Care Team:  Ana Luisa Diaz MD as PCP - General  León Duggan MD as Consulting Physician (Urology)    Smoking Status:  Social History     Tobacco Use   Smoking Status Former Smoker       Alcohol Consumption:  Social History     Substance and Sexual Activity   Alcohol Use No       Depression Screen:   PHQ-2/PHQ-9 Depression Screening 1/13/2020   Little interest or pleasure in doing things 0   Feeling down, depressed, or hopeless 0   Total Score 0       Fall Risk Screen:  NICOL Fall Risk Assessment was completed, and patient is at LOW risk for falls.Assessment completed on:1/13/2020    Health Habits and Functional and Cognitive Screening:  Functional & Cognitive Status 1/13/2020   Do you have difficulty preparing food and eating? No   Do you have difficulty bathing yourself, getting dressed or grooming yourself? No   Do you have difficulty using the toilet? No   Do you have difficulty moving around from place to place? No   Do you have trouble with steps or getting out of a bed or a chair? No   Current Diet Well Balanced Diet   Dental Exam Up to date   Eye Exam Up to date   Exercise (times per week) 3 times per week   Current Exercise Activities Include Walking   Do you need help using the phone?  No   Are you deaf or do you have serious difficulty hearing?  No   Do you need help with transportation? No   Do you need help shopping? No   Do you need help preparing meals?  No   Do you need help with housework?  No   Do you need help with laundry? No   Do you need help taking your medications? No   Do you  need help managing money? No   Do you ever drive or ride in a car without wearing a seat belt? No   Have you felt unusual stress, anger or loneliness in the last month? No   Who do you live with? Spouse   If you need help, do you have trouble finding someone available to you? No   Have you been bothered in the last four weeks by sexual problems? No   Do you have difficulty concentrating, remembering or making decisions? No         Does the patient have evidence of cognitive impairment? No    Asprin use counseling:Taking ASA appropriately as indicated    Age-appropriate Screening Schedule:  Refer to the list below for future screening recommendations based on patient's age, sex and/or medical conditions. Orders for these recommended tests are listed in the plan section. The patient has been provided with a written plan.    Health Maintenance   Topic Date Due   • ZOSTER VACCINE (2 of 2) 02/07/2018   • LIPID PANEL  03/16/2019   • DXA SCAN  01/08/2020   • MAMMOGRAM  01/13/2020   • TDAP/TD VACCINES (2 - Td) 08/07/2025   • COLONOSCOPY  12/13/2027   • INFLUENZA VACCINE  Addressed          The following portions of the patient's history were reviewed and updated as appropriate: allergies, current medications, past family history, past medical history, past social history, past surgical history and problem list.    Outpatient Medications Prior to Visit   Medication Sig Dispense Refill   • aspirin 81 MG tablet Take 81 mg by mouth daily.     • atorvastatin (LIPITOR) 10 MG tablet TAKE 1 TABLET EVERY DAY 90 tablet 1   • CALCIUM CITRATE-VITAMIN D PO      • Cranberry-Vitamin C (AZO CRANBERRY URINARY TRACT PO) Take  by mouth 2 (Two) Times a Day.     • FOLIC ACID  mg.     • Ibuprofen 200 MG capsule      • lisinopril (PRINIVIL,ZESTRIL) 40 MG tablet Take 1 tablet by mouth Daily. LAST REFILL. NEEDS APT WITH DR JOHNSON 30 tablet 0   • Magnesium 500 MG capsule Take  by mouth.     • vitamin B-12 (CYANOCOBALAMIN) 1000 MCG tablet Take  1,000 mcg by mouth Daily.     • vitamin B-6 (PYRIDOXINE) 50 MG tablet Take 50 mg by mouth Daily.     • fluticasone (FLONASE) 50 MCG/ACT nasal spray 2 sprays into each nostril daily. 2 sprays each nostril once daily 3 each 3     No facility-administered medications prior to visit.        Patient Active Problem List   Diagnosis   • Hyperlipidemia   • Hyperglycemia   • Hypertension   • Urinary retention   • Urethral cyst   • HTN, white coat   • Menopausal vaginal dryness   • Osteopenia of left hip   • Hearing loss due to cerumen impaction, bilateral   • Injury of nail bed of toe   • Acute URI       Advanced Care Planning:  Patient has an advance directive - a copy has not been provided. Have asked the patient to send this to us to add to record    Review of Systems   Constitutional: Negative.    HENT: Positive for postnasal drip and sinus pressure.    Eyes: Negative.    Respiratory: Negative.    Cardiovascular: Negative.    Gastrointestinal: Negative.    Endocrine: Negative.    Genitourinary: Negative.    Musculoskeletal: Negative.    Skin: Negative.    Allergic/Immunologic: Negative.    Neurological: Negative.    Hematological: Negative.    Psychiatric/Behavioral: Negative.    All other systems reviewed and are negative.      Compared to one year ago, the patient feels her physical health is the same.  Compared to one year ago, the patient feels her mental health is the same.    Reviewed chart for potential of high risk medication in the elderly: yes  Reviewed chart for potential of harmful drug interactions in the elderly:yes    Objective         Vitals:    01/13/20 1102   BP: (!) 164/104   Pulse: (!) 121   Resp: 18   SpO2: 98%   Weight: 54.9 kg (121 lb)       Body mass index is 23.63 kg/m².  Discussed the patient's BMI with her. The BMI is in the acceptable range.    Physical Exam   Constitutional: She is oriented to person, place, and time. She appears well-developed and well-nourished. No distress.   HENT:   Head:  Normocephalic and atraumatic.   Right Ear: External ear normal.   Left Ear: External ear normal.   Nose: Nose normal.   Mouth/Throat: Oropharynx is clear and moist.   Eyes: Pupils are equal, round, and reactive to light. Conjunctivae and EOM are normal.   Neck: Normal range of motion. Neck supple.   Cardiovascular: Normal rate, regular rhythm, normal heart sounds and intact distal pulses.   Pulmonary/Chest: Effort normal and breath sounds normal. No respiratory distress. She has no wheezes.   Musculoskeletal: Normal range of motion.   Normal gait   Neurological: She is alert and oriented to person, place, and time.   Skin: Skin is warm and dry.   Psychiatric: She has a normal mood and affect. Her behavior is normal. Judgment and thought content normal.   Nursing note and vitals reviewed.            Assessment/Plan   Medicare Risks and Personalized Health Plan  CMS Preventative Services Quick Reference  Advance Directive Discussion  Breast Cancer/Mammogram Screening  Colon Cancer Screening  Immunizations Discussed/Encouraged (specific immunizations; adacel Tdap, Influenza, Prevnar and Shingrix )  Osteoprorosis Risk  Polypharmacy    The above risks/problems have been discussed with the patient.  Pertinent information has been shared with the patient in the After Visit Summary.  Follow up plans and orders are seen below in the Assessment/Plan Section.    Diagnoses and all orders for this visit:    1. Medicare annual wellness visit, initial (Primary)    2. Hyperglycemia  -     Comprehensive Metabolic Panel  -     CBC & Differential  -     T4, Free  -     TSH  -     Hemoglobin A1c  -     NMR LipoProfile    3. Essential hypertension  -     Comprehensive Metabolic Panel  -     CBC & Differential  -     T4, Free  -     TSH  -     NMR LipoProfile  -     hydroCHLOROthiazide (HYDRODIURIL) 25 MG tablet; Take 1 tablet by mouth Daily.  Dispense: 90 tablet; Refill: 0    4. Hyperlipidemia, unspecified hyperlipidemia type  -      Comprehensive Metabolic Panel  -     CBC & Differential  -     T4, Free  -     TSH  -     Hemoglobin A1c  -     NMR LipoProfile    5. Breast cancer screening by mammogram  -     Mammo Screening Bilateral With CAD; Future    6. Post-menopausal  -     DEXA Bone Density Axial; Future    7. Sinus pressure  -     fluticasone (FLONASE) 50 MCG/ACT nasal spray; 2 sprays into the nostril(s) as directed by provider Daily. 2 sprays each nostril once daily  Dispense: 3 bottle; Refill: 1    8. Other allergic rhinitis  -     fluticasone (FLONASE) 50 MCG/ACT nasal spray; 2 sprays into the nostril(s) as directed by provider Daily. 2 sprays each nostril once daily  Dispense: 3 bottle; Refill: 1    cont saline wash     Pt has copy of c-scope at home and will bring this in.     Follow Up:  Return in about 3 months (around 4/13/2020).     An After Visit Summary and PPPS were given to the patient.

## 2020-01-13 NOTE — PATIENT INSTRUCTIONS
Medicare Wellness  Personal Prevention Plan of Service     Date of Office Visit:  2020  Encounter Provider:  Ana Luisa Diaz MD  Place of Service:  [facility]  Patient Name: Adali Hankins  :  1945    As part of the Medicare Wellness portion of your visit today, we are providing you with this personalized preventive plan of services (PPPS). This plan is based upon recommendations of the [place] Preventive Services Task Force (USPSTF) and the Advisory Committee on Immunization Practices (ACIP).    This lists the preventive care services that should be considered, and provides dates of when you are due. Items listed as completed are up-to-date and do not require any further intervention.    Health Maintenance   Topic Date Due   • ZOSTER VACCINE (2 of 2) 2018   • LIPID PANEL  2019   • DXA SCAN  2020   • MAMMOGRAM  2020   • Pneumococcal Vaccine Once at 65 Years Old  2020 (Originally 10/10/2009)   • MEDICARE ANNUAL WELLNESS  2021   • TDAP/TD VACCINES (2 - Td) 2025   • COLONOSCOPY  2027   • INFLUENZA VACCINE  Addressed       Orders Placed This Encounter   Procedures   • Mammo Screening Bilateral With CAD     Standing Status:   Future     Standing Expiration Date:   2021     Order Specific Question:   Reason for Exam:     Answer:   screening   • DEXA Bone Density Axial     Standing Status:   Future     Standing Expiration Date:   2021     Order Specific Question:   Reason for Exam:     Answer:   screening   • Comprehensive Metabolic Panel   • T4, Free   • TSH   • Hemoglobin A1c   • NMR LipoProfile   • CBC & Differential     Order Specific Question:   Manual Differential     Answer:   No       Return in about 3 months (around 2020) for Recheck.

## 2020-01-14 LAB
ALBUMIN SERPL-MCNC: 4.7 G/DL (ref 3.5–5.2)
ALBUMIN/GLOB SERPL: 1.7 G/DL
ALP SERPL-CCNC: 44 U/L (ref 39–117)
ALT SERPL-CCNC: 18 U/L (ref 1–33)
AST SERPL-CCNC: 20 U/L (ref 1–32)
BASOPHILS # BLD AUTO: 0.03 10*3/MM3 (ref 0–0.2)
BASOPHILS NFR BLD AUTO: 0.9 % (ref 0–1.5)
BILIRUB SERPL-MCNC: 0.6 MG/DL (ref 0.2–1.2)
BUN SERPL-MCNC: 7 MG/DL (ref 8–23)
BUN/CREAT SERPL: 8 (ref 7–25)
CALCIUM SERPL-MCNC: 9.7 MG/DL (ref 8.6–10.5)
CHLORIDE SERPL-SCNC: 99 MMOL/L (ref 98–107)
CHOLEST SERPL-MCNC: 340 MG/DL (ref 100–199)
CO2 SERPL-SCNC: 29.6 MMOL/L (ref 22–29)
CREAT SERPL-MCNC: 0.87 MG/DL (ref 0.57–1)
EOSINOPHIL # BLD AUTO: 0.01 10*3/MM3 (ref 0–0.4)
EOSINOPHIL NFR BLD AUTO: 0.3 % (ref 0.3–6.2)
ERYTHROCYTE [DISTWIDTH] IN BLOOD BY AUTOMATED COUNT: 12.5 % (ref 12.3–15.4)
GLOBULIN SER CALC-MCNC: 2.8 GM/DL
GLUCOSE SERPL-MCNC: 98 MG/DL (ref 65–99)
HBA1C MFR BLD: 5.4 % (ref 4.8–5.6)
HCT VFR BLD AUTO: 38.8 % (ref 34–46.6)
HDL SERPL-SCNC: 39.3 UMOL/L
HDLC SERPL-MCNC: 104 MG/DL
HGB BLD-MCNC: 13.4 G/DL (ref 12–15.9)
IMM GRANULOCYTES # BLD AUTO: 0 10*3/MM3 (ref 0–0.05)
IMM GRANULOCYTES NFR BLD AUTO: 0 % (ref 0–0.5)
LDL SERPL QN: 21.6 NM
LDL SERPL-SCNC: 1996 NMOL/L
LDL SMALL SERPL-SCNC: <90 NMOL/L
LDLC SERPL CALC-MCNC: 221 MG/DL (ref 0–99)
LYMPHOCYTES # BLD AUTO: 0.92 10*3/MM3 (ref 0.7–3.1)
LYMPHOCYTES NFR BLD AUTO: 28.8 % (ref 19.6–45.3)
MCH RBC QN AUTO: 31.2 PG (ref 26.6–33)
MCHC RBC AUTO-ENTMCNC: 34.5 G/DL (ref 31.5–35.7)
MCV RBC AUTO: 90.2 FL (ref 79–97)
MONOCYTES # BLD AUTO: 0.29 10*3/MM3 (ref 0.1–0.9)
MONOCYTES NFR BLD AUTO: 9.1 % (ref 5–12)
NEUTROPHILS # BLD AUTO: 1.94 10*3/MM3 (ref 1.7–7)
NEUTROPHILS NFR BLD AUTO: 60.9 % (ref 42.7–76)
NRBC BLD AUTO-RTO: 0 /100 WBC (ref 0–0.2)
PLATELET # BLD AUTO: 186 10*3/MM3 (ref 140–450)
POTASSIUM SERPL-SCNC: 3.8 MMOL/L (ref 3.5–5.2)
PROT SERPL-MCNC: 7.5 G/DL (ref 6–8.5)
RBC # BLD AUTO: 4.3 10*6/MM3 (ref 3.77–5.28)
SODIUM SERPL-SCNC: 140 MMOL/L (ref 136–145)
T4 FREE SERPL-MCNC: 1.41 NG/DL (ref 0.93–1.7)
TRIGL SERPL-MCNC: 77 MG/DL (ref 0–149)
TSH SERPL DL<=0.005 MIU/L-ACNC: 1.04 UIU/ML (ref 0.27–4.2)
WBC # BLD AUTO: 3.19 10*3/MM3 (ref 3.4–10.8)

## 2020-01-17 ENCOUNTER — TELEPHONE (OUTPATIENT)
Dept: INTERNAL MEDICINE | Facility: CLINIC | Age: 75
End: 2020-01-17

## 2020-01-17 DIAGNOSIS — E78.5 HYPERLIPIDEMIA, UNSPECIFIED HYPERLIPIDEMIA TYPE: Primary | ICD-10-CM

## 2020-01-17 NOTE — TELEPHONE ENCOUNTER
"Pt advised and voiced understanding. Pt states she does not take the Lipitor daily and does not want to stay on this medication. Pt states she gets \"muscle/bone pain\" from Lipitor. Pt would like to try another cholesterol medication. Please advise and route to the pool. Thank you   "

## 2020-01-17 NOTE — TELEPHONE ENCOUNTER
----- Message from Ana Luisa Diaz MD sent at 1/14/2020  4:13 PM EST -----  Call pt about labs.  Labs ok other than cholesterol very high.  Is pt taking lipitor daily?  If so increase to lipitor 40mg daily.  If not taking current med start taking every day.

## 2020-01-20 ENCOUNTER — TELEPHONE (OUTPATIENT)
Dept: INTERNAL MEDICINE | Facility: CLINIC | Age: 75
End: 2020-01-20

## 2020-01-20 DIAGNOSIS — I10 ESSENTIAL HYPERTENSION: ICD-10-CM

## 2020-01-20 RX ORDER — LISINOPRIL 40 MG/1
40 TABLET ORAL DAILY
Qty: 30 TABLET | Refills: 0 | Status: SHIPPED | OUTPATIENT
Start: 2020-01-20 | End: 2020-02-11 | Stop reason: SDUPTHER

## 2020-01-20 RX ORDER — LISINOPRIL 40 MG/1
TABLET ORAL
Qty: 90 TABLET | Refills: 0 | Status: SHIPPED | OUTPATIENT
Start: 2020-01-20 | End: 2020-01-20 | Stop reason: SDUPTHER

## 2020-01-23 RX ORDER — ROSUVASTATIN CALCIUM 5 MG/1
5 TABLET, COATED ORAL EVERY OTHER DAY
Qty: 30 TABLET | Refills: 0 | Status: SHIPPED | OUTPATIENT
Start: 2020-01-23 | End: 2020-02-28 | Stop reason: SDUPTHER

## 2020-02-05 ENCOUNTER — OFFICE VISIT (OUTPATIENT)
Dept: INTERNAL MEDICINE | Facility: CLINIC | Age: 75
End: 2020-02-05

## 2020-02-05 VITALS
RESPIRATION RATE: 16 BRPM | HEIGHT: 60 IN | BODY MASS INDEX: 23.56 KG/M2 | TEMPERATURE: 97.6 F | SYSTOLIC BLOOD PRESSURE: 140 MMHG | OXYGEN SATURATION: 98 % | DIASTOLIC BLOOD PRESSURE: 84 MMHG | WEIGHT: 120 LBS | HEART RATE: 86 BPM

## 2020-02-05 DIAGNOSIS — J01.00 ACUTE MAXILLARY SINUSITIS, RECURRENCE NOT SPECIFIED: Primary | ICD-10-CM

## 2020-02-05 PROCEDURE — 99213 OFFICE O/P EST LOW 20 MIN: CPT | Performed by: INTERNAL MEDICINE

## 2020-02-05 RX ORDER — AZITHROMYCIN 250 MG/1
TABLET, FILM COATED ORAL
Qty: 6 TABLET | Refills: 0 | Status: SHIPPED | OUTPATIENT
Start: 2020-02-05 | End: 2021-03-02

## 2020-02-05 NOTE — PROGRESS NOTES
Adali Hankins is a 74 y.o. female, who presents with a chief complaint of   Chief Complaint   Patient presents with   • Sinusitis   • Headache       HPI   Pt here bc of sinus congestion and headache for > 1 week.  No fever.  No productive cough.  She has tried flonase and just cant get things cleared up. She feels like her head is socked in.  The right side of her head is worse than the right.  No n/v/d.       The following portions of the patient's history were reviewed and updated as appropriate: allergies, current medications, past family history, past medical history, past social history, past surgical history and problem list.    Allergies: Patient has no known allergies.    Review of Systems   Constitutional: Negative.    HENT: Positive for congestion, sinus pressure and sinus pain.    Eyes: Negative.    Respiratory: Negative.  Negative for cough.    Cardiovascular: Negative.    Gastrointestinal: Negative.    Endocrine: Negative.    Genitourinary: Negative.    Musculoskeletal: Negative.    Skin: Negative.    Allergic/Immunologic: Negative.    Neurological: Negative.    Hematological: Negative.    Psychiatric/Behavioral: Negative.    All other systems reviewed and are negative.            Wt Readings from Last 3 Encounters:   02/05/20 54.4 kg (120 lb)   01/13/20 54.9 kg (121 lb)   12/18/18 58.5 kg (129 lb)     Temp Readings from Last 3 Encounters:   02/05/20 97.6 °F (36.4 °C) (Oral)   12/18/18 98.8 °F (37.1 °C)   06/23/17 97.6 °F (36.4 °C) (Oral)     BP Readings from Last 3 Encounters:   02/05/20 140/84   01/13/20 (!) 164/104   12/18/18 (!) 180/106     Pulse Readings from Last 3 Encounters:   02/05/20 86   01/13/20 (!) 121   12/18/18 97     Body mass index is 23.44 kg/m².  @LASTSAO2(3)@    Physical Exam   Constitutional: She is oriented to person, place, and time. She appears well-developed and well-nourished.   HENT:   Head: Normocephalic and atraumatic.   Right Ear: External ear normal.   Left Ear:  External ear normal.   Bilateral serous effusion without erythema   Eyes: Conjunctivae and lids are normal.   Neck: Normal range of motion. Neck supple.   Cardiovascular: Normal rate and regular rhythm.   Pulmonary/Chest: Effort normal and breath sounds normal. No respiratory distress. She has no wheezes.   Musculoskeletal: Normal range of motion. She exhibits no edema.   Lymphadenopathy:     She has cervical adenopathy.   Neurological: She is alert and oriented to person, place, and time. No cranial nerve deficit.   Skin: Skin is warm and dry. No rash noted.   Psychiatric: She has a normal mood and affect. Her behavior is normal. Thought content normal.   Nursing note and vitals reviewed.      Results for orders placed or performed in visit on 01/13/20   Comprehensive Metabolic Panel   Result Value Ref Range    Glucose 98 65 - 99 mg/dL    BUN 7 (L) 8 - 23 mg/dL    Creatinine 0.87 0.57 - 1.00 mg/dL    eGFR Non African Am 63 >60 mL/min/1.73    eGFR African Am 77 >60 mL/min/1.73    BUN/Creatinine Ratio 8.0 7.0 - 25.0    Sodium 140 136 - 145 mmol/L    Potassium 3.8 3.5 - 5.2 mmol/L    Chloride 99 98 - 107 mmol/L    Total CO2 29.6 (H) 22.0 - 29.0 mmol/L    Calcium 9.7 8.6 - 10.5 mg/dL    Total Protein 7.5 6.0 - 8.5 g/dL    Albumin 4.70 3.50 - 5.20 g/dL    Globulin 2.8 gm/dL    A/G Ratio 1.7 g/dL    Total Bilirubin 0.6 0.2 - 1.2 mg/dL    Alkaline Phosphatase 44 39 - 117 U/L    AST (SGOT) 20 1 - 32 U/L    ALT (SGPT) 18 1 - 33 U/L   T4, Free   Result Value Ref Range    Free T4 1.41 0.93 - 1.70 ng/dL   TSH   Result Value Ref Range    TSH 1.040 0.270 - 4.200 uIU/mL   Hemoglobin A1c   Result Value Ref Range    Hemoglobin A1C 5.40 4.80 - 5.60 %   NMR LipoProfile   Result Value Ref Range    LDL-P 1,996 (H) <1,000 nmol/L    LDL-C 221 (H) 0 - 99 mg/dL    HDL-C 104 >39 mg/dL    Triglycerides 77 0 - 149 mg/dL    Total Cholesterol 340 (H) 100 - 199 mg/dL    HDL-P (Total) 39.3 >=30.5 umol/L    Small LDL-P <90 <=527 nmol/L    LDL Size  21.6 >20.5 nm   CBC & Differential   Result Value Ref Range    WBC 3.19 (L) 3.40 - 10.80 10*3/mm3    RBC 4.30 3.77 - 5.28 10*6/mm3    Hemoglobin 13.4 12.0 - 15.9 g/dL    Hematocrit 38.8 34.0 - 46.6 %    MCV 90.2 79.0 - 97.0 fL    MCH 31.2 26.6 - 33.0 pg    MCHC 34.5 31.5 - 35.7 g/dL    RDW 12.5 12.3 - 15.4 %    Platelets 186 140 - 450 10*3/mm3    Neutrophil Rel % 60.9 42.7 - 76.0 %    Lymphocyte Rel % 28.8 19.6 - 45.3 %    Monocyte Rel % 9.1 5.0 - 12.0 %    Eosinophil Rel % 0.3 0.3 - 6.2 %    Basophil Rel % 0.9 0.0 - 1.5 %    Neutrophils Absolute 1.94 1.70 - 7.00 10*3/mm3    Lymphocytes Absolute 0.92 0.70 - 3.10 10*3/mm3    Monocytes Absolute 0.29 0.10 - 0.90 10*3/mm3    Eosinophils Absolute 0.01 0.00 - 0.40 10*3/mm3    Basophils Absolute 0.03 0.00 - 0.20 10*3/mm3    Immature Granulocyte Rel % 0.0 0.0 - 0.5 %    Immature Grans Absolute 0.00 0.00 - 0.05 10*3/mm3    nRBC 0.0 0.0 - 0.2 /100 WBC           Adali was seen today for sinusitis and headache.    Diagnoses and all orders for this visit:    Acute maxillary sinusitis, recurrence not specified  -     azithromycin (ZITHROMAX) 250 MG tablet; Take 2 tablets the first day, then 1 tablet daily for 4 days.    cont flonase      Outpatient Medications Prior to Visit   Medication Sig Dispense Refill   • aspirin 81 MG tablet Take 81 mg by mouth daily.     • atorvastatin (LIPITOR) 10 MG tablet TAKE 1 TABLET EVERY DAY 90 tablet 1   • CALCIUM CITRATE-VITAMIN D PO      • Cranberry-Vitamin C (AZO CRANBERRY URINARY TRACT PO) Take  by mouth 2 (Two) Times a Day.     • fluticasone (FLONASE) 50 MCG/ACT nasal spray 2 sprays into the nostril(s) as directed by provider Daily. 2 sprays each nostril once daily 3 bottle 1   • FOLIC ACID  mg.     • hydroCHLOROthiazide (HYDRODIURIL) 25 MG tablet Take 1 tablet by mouth Daily. 90 tablet 0   • Ibuprofen 200 MG capsule      • lisinopril (PRINIVIL,ZESTRIL) 40 MG tablet Take 1 tablet by mouth Daily. 30 tablet 0   • Magnesium 500 MG capsule  Take  by mouth.     • rosuvastatin (CRESTOR) 5 MG tablet Take 1 tablet by mouth Every Other Day. 30 tablet 0   • vitamin B-12 (CYANOCOBALAMIN) 1000 MCG tablet Take 1,000 mcg by mouth Daily.     • vitamin B-6 (PYRIDOXINE) 50 MG tablet Take 50 mg by mouth Daily.       No facility-administered medications prior to visit.      New Medications Ordered This Visit   Medications   • azithromycin (ZITHROMAX) 250 MG tablet     Sig: Take 2 tablets the first day, then 1 tablet daily for 4 days.     Dispense:  6 tablet     Refill:  0     [unfilled]  There are no discontinued medications.      Return if symptoms worsen or fail to improve.

## 2020-02-11 DIAGNOSIS — I10 ESSENTIAL HYPERTENSION: ICD-10-CM

## 2020-02-11 RX ORDER — LISINOPRIL 40 MG/1
40 TABLET ORAL DAILY
Qty: 90 TABLET | Refills: 1 | Status: SHIPPED | OUTPATIENT
Start: 2020-02-11 | End: 2020-03-25

## 2020-02-18 ENCOUNTER — HOSPITAL ENCOUNTER (OUTPATIENT)
Dept: MAMMOGRAPHY | Facility: HOSPITAL | Age: 75
Discharge: HOME | End: 2020-02-18
Admitting: INTERNAL MEDICINE

## 2020-02-18 ENCOUNTER — APPOINTMENT (OUTPATIENT)
Dept: BONE DENSITY | Facility: HOSPITAL | Age: 75
End: 2020-02-18

## 2020-02-18 DIAGNOSIS — Z12.31 BREAST CANCER SCREENING BY MAMMOGRAM: ICD-10-CM

## 2020-02-18 DIAGNOSIS — Z78.0 POST-MENOPAUSAL: ICD-10-CM

## 2020-02-18 PROCEDURE — 77067 SCR MAMMO BI INCL CAD: CPT

## 2020-02-18 PROCEDURE — 77063 BREAST TOMOSYNTHESIS BI: CPT

## 2020-02-18 PROCEDURE — 77080 DXA BONE DENSITY AXIAL: CPT

## 2020-02-28 ENCOUNTER — TELEPHONE (OUTPATIENT)
Dept: INTERNAL MEDICINE | Facility: CLINIC | Age: 75
End: 2020-02-28

## 2020-02-28 DIAGNOSIS — E78.5 HYPERLIPIDEMIA, UNSPECIFIED HYPERLIPIDEMIA TYPE: ICD-10-CM

## 2020-02-28 RX ORDER — ROSUVASTATIN CALCIUM 5 MG/1
5 TABLET, COATED ORAL EVERY OTHER DAY
Qty: 90 TABLET | Refills: 0 | Status: SHIPPED | OUTPATIENT
Start: 2020-02-28 | End: 2020-08-14 | Stop reason: SDUPTHER

## 2020-02-28 NOTE — TELEPHONE ENCOUNTER
Pt would like to know if aleshia could call in her rosuvastatin lexie 5mg tab nov to [facility] pharmacy. Would like a 90 day supply. Last fill date was 1/23/2020. Sees aleshia. Call back is [phone].

## 2020-03-12 DIAGNOSIS — I10 ESSENTIAL HYPERTENSION: ICD-10-CM

## 2020-03-12 NOTE — TELEPHONE ENCOUNTER
Pt checking status of scripts for rosuvastatin (CRESTOR) 5 MG tablet which shows to have been ordered on 2/28/20, but patient has not received from mail order pharmacy.  Can status of this be checked with pharmacy?    Pt needs script for hydroCHLOROthiazide (HYDRODIURIL) 25 MG tablet sent to pharmacy on file please.

## 2020-03-13 RX ORDER — HYDROCHLOROTHIAZIDE 25 MG/1
25 TABLET ORAL DAILY
Qty: 90 TABLET | Refills: 0 | Status: SHIPPED | OUTPATIENT
Start: 2020-03-13 | End: 2020-05-26

## 2020-03-24 ENCOUNTER — OFFICE VISIT (OUTPATIENT)
Dept: INTERNAL MEDICINE | Facility: CLINIC | Age: 75
End: 2020-03-24

## 2020-03-24 VITALS
WEIGHT: 122 LBS | SYSTOLIC BLOOD PRESSURE: 136 MMHG | RESPIRATION RATE: 16 BRPM | DIASTOLIC BLOOD PRESSURE: 78 MMHG | HEART RATE: 104 BPM | HEIGHT: 60 IN | TEMPERATURE: 97.9 F | BODY MASS INDEX: 23.95 KG/M2 | OXYGEN SATURATION: 97 %

## 2020-03-24 DIAGNOSIS — I10 ESSENTIAL HYPERTENSION: ICD-10-CM

## 2020-03-24 DIAGNOSIS — R30.0 DYSURIA: Primary | ICD-10-CM

## 2020-03-24 LAB
BILIRUB BLD-MCNC: NEGATIVE MG/DL
CLARITY, POC: CLEAR
COLOR UR: ABNORMAL
GLUCOSE UR STRIP-MCNC: ABNORMAL MG/DL
KETONES UR QL: NEGATIVE
LEUKOCYTE EST, POC: NEGATIVE
NITRITE UR-MCNC: POSITIVE MG/ML
PH UR: 6.5 [PH] (ref 5–8)
PROT UR STRIP-MCNC: NEGATIVE MG/DL
RBC # UR STRIP: ABNORMAL /UL
SP GR UR: 1.01 (ref 1–1.03)
UROBILINOGEN UR QL: NORMAL

## 2020-03-24 PROCEDURE — 99213 OFFICE O/P EST LOW 20 MIN: CPT | Performed by: INTERNAL MEDICINE

## 2020-03-24 PROCEDURE — 81003 URINALYSIS AUTO W/O SCOPE: CPT | Performed by: INTERNAL MEDICINE

## 2020-03-24 RX ORDER — CEPHALEXIN 500 MG/1
500 CAPSULE ORAL 2 TIMES DAILY
Qty: 14 CAPSULE | Refills: 0 | Status: SHIPPED | OUTPATIENT
Start: 2020-03-24 | End: 2020-03-31

## 2020-03-24 NOTE — PROGRESS NOTES
Adali Hankins is a 74 y.o. female, who presents with a chief complaint of   Chief Complaint   Patient presents with   • Back Pain   • Orange Urine       HPI   Pt here bc of dysuria and frequency for about a week.  She doesn't feel like she is emptying her bladder well.  No fever.  She has pain across her low back.  No n/v/d.  She took azo x 1 at home. No hx renal stones.      The following portions of the patient's history were reviewed and updated as appropriate: allergies, current medications, past family history, past medical history, past social history, past surgical history and problem list.    Allergies: Patient has no known allergies.    Review of Systems   Constitutional: Negative.    HENT: Negative.    Eyes: Negative.    Respiratory: Negative.    Cardiovascular: Negative.    Gastrointestinal: Negative.  Negative for abdominal pain, diarrhea, nausea and vomiting.   Endocrine: Negative.    Genitourinary: Positive for difficulty urinating, dysuria and frequency.   Musculoskeletal: Negative.    Skin: Negative.    Allergic/Immunologic: Negative.    Neurological: Negative.    Hematological: Negative.    Psychiatric/Behavioral: Negative.    All other systems reviewed and are negative.            Wt Readings from Last 3 Encounters:   03/24/20 55.3 kg (122 lb)   02/05/20 54.4 kg (120 lb)   01/13/20 54.9 kg (121 lb)     Temp Readings from Last 3 Encounters:   03/24/20 97.9 °F (36.6 °C) (Oral)   02/05/20 97.6 °F (36.4 °C) (Oral)   12/18/18 98.8 °F (37.1 °C)     BP Readings from Last 3 Encounters:   03/24/20 136/78   02/05/20 140/84   01/13/20 (!) 164/104     Pulse Readings from Last 3 Encounters:   03/24/20 104   02/05/20 86   01/13/20 (!) 121     Body mass index is 23.83 kg/m².  @LASTSAO2(3)@    Physical Exam   Constitutional: She is oriented to person, place, and time. She appears well-developed and well-nourished. No distress.   HENT:   Head: Normocephalic and atraumatic.   Right Ear: External ear normal.    Left Ear: External ear normal.   Nose: Nose normal.   Mouth/Throat: Oropharynx is clear and moist.   Eyes: Pupils are equal, round, and reactive to light. Conjunctivae and EOM are normal.   Neck: Normal range of motion. Neck supple.   Cardiovascular: Normal rate, regular rhythm, normal heart sounds and intact distal pulses.   Pulmonary/Chest: Effort normal and breath sounds normal. No respiratory distress. She has no wheezes.   Abdominal:   No cva tenderness   Musculoskeletal: Normal range of motion.   Normal gait   Neurological: She is alert and oriented to person, place, and time.   Skin: Skin is warm and dry.   Psychiatric: She has a normal mood and affect. Her behavior is normal. Judgment and thought content normal.   Nursing note and vitals reviewed.      Results for orders placed or performed in visit on 03/24/20   POC Urinalysis Dipstick, Automated   Result Value Ref Range    Color Orange (A) Yellow, Straw, Dark Yellow, Adriane    Clarity, UA Clear Clear    Specific Gravity  1.010 1.005 - 1.030    pH, Urine 6.5 5.0 - 8.0    Leukocytes Negative Negative    Nitrite, UA Positive (A) Negative    Protein, POC Negative Negative mg/dL    Glucose,  mg/dL (A) Negative, 1000 mg/dL (3+) mg/dL    Ketones, UA Negative Negative    Urobilinogen, UA Normal Normal    Bilirubin Negative Negative    Blood, UA Trace (A) Negative           Adali was seen today for back pain and orange urine.    Diagnoses and all orders for this visit:    Dysuria  -     POC Urinalysis Dipstick, Automated  -     Urine Culture - Urine, Urine, Clean Catch  -     cephalexin (Keflex) 500 MG capsule; Take 1 capsule by mouth 2 (Two) Times a Day for 7 days.          Outpatient Medications Prior to Visit   Medication Sig Dispense Refill   • aspirin 81 MG tablet Take 81 mg by mouth daily.     • azithromycin (ZITHROMAX) 250 MG tablet Take 2 tablets the first day, then 1 tablet daily for 4 days. 6 tablet 0   • CALCIUM CITRATE-VITAMIN D PO      •  Cranberry-Vitamin C (AZO CRANBERRY URINARY TRACT PO) Take  by mouth 2 (Two) Times a Day.     • fluticasone (FLONASE) 50 MCG/ACT nasal spray 2 sprays into the nostril(s) as directed by provider Daily. 2 sprays each nostril once daily 3 bottle 1   • FOLIC ACID  mg.     • hydroCHLOROthiazide (HYDRODIURIL) 25 MG tablet Take 1 tablet by mouth Daily. 90 tablet 0   • Ibuprofen 200 MG capsule      • lisinopril (PRINIVIL,ZESTRIL) 40 MG tablet Take 1 tablet by mouth Daily. 90 tablet 1   • Magnesium 500 MG capsule Take  by mouth.     • rosuvastatin (CRESTOR) 5 MG tablet Take 1 tablet by mouth Every Other Day. 90 tablet 0   • vitamin B-12 (CYANOCOBALAMIN) 1000 MCG tablet Take 1,000 mcg by mouth Daily.     • vitamin B-6 (PYRIDOXINE) 50 MG tablet Take 50 mg by mouth Daily.       No facility-administered medications prior to visit.      New Medications Ordered This Visit   Medications   • cephalexin (Keflex) 500 MG capsule     Sig: Take 1 capsule by mouth 2 (Two) Times a Day for 7 days.     Dispense:  14 capsule     Refill:  0     [unfilled]  There are no discontinued medications.      Return if symptoms worsen or fail to improve.

## 2020-03-25 RX ORDER — LISINOPRIL 40 MG/1
TABLET ORAL
Qty: 90 TABLET | Refills: 1 | Status: SHIPPED | OUTPATIENT
Start: 2020-03-25 | End: 2020-10-07

## 2020-03-26 LAB
BACTERIA UR CULT: NO GROWTH
BACTERIA UR CULT: NORMAL

## 2020-05-26 DIAGNOSIS — I10 ESSENTIAL HYPERTENSION: ICD-10-CM

## 2020-05-26 RX ORDER — HYDROCHLOROTHIAZIDE 25 MG/1
TABLET ORAL
Qty: 90 TABLET | Refills: 0 | Status: SHIPPED | OUTPATIENT
Start: 2020-05-26 | End: 2020-10-07

## 2020-07-06 DIAGNOSIS — J30.89 OTHER ALLERGIC RHINITIS: ICD-10-CM

## 2020-07-06 DIAGNOSIS — J34.89 SINUS PRESSURE: ICD-10-CM

## 2020-07-07 RX ORDER — FLUTICASONE PROPIONATE 50 MCG
SPRAY, SUSPENSION (ML) NASAL
Qty: 48 G | Refills: 2 | Status: SHIPPED | OUTPATIENT
Start: 2020-07-07

## 2020-08-14 DIAGNOSIS — E78.5 HYPERLIPIDEMIA, UNSPECIFIED HYPERLIPIDEMIA TYPE: ICD-10-CM

## 2020-08-14 RX ORDER — ROSUVASTATIN CALCIUM 5 MG/1
TABLET, COATED ORAL
Qty: 45 TABLET | Refills: 0 | Status: SHIPPED | OUTPATIENT
Start: 2020-08-14 | End: 2020-10-23

## 2020-10-07 DIAGNOSIS — I10 ESSENTIAL HYPERTENSION: ICD-10-CM

## 2020-10-07 RX ORDER — HYDROCHLOROTHIAZIDE 25 MG/1
TABLET ORAL
Qty: 90 TABLET | Refills: 0 | Status: SHIPPED | OUTPATIENT
Start: 2020-10-07 | End: 2020-12-04

## 2020-10-07 RX ORDER — LISINOPRIL 40 MG/1
TABLET ORAL
Qty: 90 TABLET | Refills: 1 | Status: SHIPPED | OUTPATIENT
Start: 2020-10-07 | End: 2021-02-16

## 2020-10-23 DIAGNOSIS — E78.5 HYPERLIPIDEMIA, UNSPECIFIED HYPERLIPIDEMIA TYPE: ICD-10-CM

## 2020-10-23 RX ORDER — ROSUVASTATIN CALCIUM 5 MG/1
TABLET, COATED ORAL
Qty: 45 TABLET | Refills: 0 | Status: SHIPPED | OUTPATIENT
Start: 2020-10-23 | End: 2020-12-23

## 2020-12-02 DIAGNOSIS — R68.89 ABNORMAL ANKLE BRACHIAL INDEX (ABI): Primary | ICD-10-CM

## 2020-12-04 DIAGNOSIS — I10 ESSENTIAL HYPERTENSION: ICD-10-CM

## 2020-12-04 RX ORDER — HYDROCHLOROTHIAZIDE 25 MG/1
TABLET ORAL
Qty: 90 TABLET | Refills: 0 | Status: SHIPPED | OUTPATIENT
Start: 2020-12-04 | End: 2021-02-10

## 2020-12-22 DIAGNOSIS — E78.5 HYPERLIPIDEMIA, UNSPECIFIED HYPERLIPIDEMIA TYPE: ICD-10-CM

## 2020-12-23 RX ORDER — ROSUVASTATIN CALCIUM 5 MG/1
TABLET, COATED ORAL
Qty: 45 TABLET | Refills: 3 | Status: SHIPPED | OUTPATIENT
Start: 2020-12-23 | End: 2021-09-27 | Stop reason: SDUPTHER

## 2021-01-12 ENCOUNTER — TELEPHONE (OUTPATIENT)
Dept: INTERNAL MEDICINE | Facility: CLINIC | Age: 76
End: 2021-01-12

## 2021-01-12 NOTE — TELEPHONE ENCOUNTER
PATIENT CALLED TO CHECK ON THE STATUS OF THE MESSAGE THAT SHE LEFT THIS MORNING (1/12/21) WITH VALERY (SEE NOTE BELOW).    PLEASE CALL THE PATIENT [phone] WHEN THIS MESSAGE HAS BEEN RECEIVED AND ADVISE.

## 2021-01-12 NOTE — TELEPHONE ENCOUNTER
Caller: Adali Hankins    Relationship: Self    Best call back number: [phone]    What medication are you requesting: ANTIBIOTICS    What are your current symptoms: CONFIRMED UTI FROM TEST FROM [employer]    How long have you been experiencing symptoms: 3 DAYS    Have you had these symptoms before:    [x] Yes  [] No    Have you been treated for these symptoms before:   [] Yes  [] No    If a prescription is needed, what is your preferred pharmacy and phone number:  [facility] [place], [place] - [address] [facility] [phone] [facility] [phone]   [phone]    Additional notes:PATIENT STATED THAT SHE RECEIVED A TEST FOR UTI FROM [employer]. PATIENT USED THE TEST THIS MORNING AND TESTED POSITIVE FOR UTI. PATIENT IS REQUESTING ANTIBIOTICS TO BE CALLED IN.      PLEASE ADVISE

## 2021-01-13 ENCOUNTER — OFFICE VISIT (OUTPATIENT)
Dept: INTERNAL MEDICINE | Facility: CLINIC | Age: 76
End: 2021-01-13

## 2021-01-13 DIAGNOSIS — R30.0 DYSURIA: Primary | ICD-10-CM

## 2021-01-13 PROCEDURE — 99442 PR PHYS/QHP TELEPHONE EVALUATION 11-20 MIN: CPT | Performed by: INTERNAL MEDICINE

## 2021-01-13 NOTE — PROGRESS NOTES
Chief Complaint  Difficulty Urinating    Subjective          Adali Hankins presents to [facility] for   History of Present Illness   This visit has been scheduled as a televisit to comply with patient safety concerns in accordance with CDC recommendations. The patient had issues with technology so the visit was changed from televisit to phone visit.      You have chosen to receive care through a telephone visit. Do you consent to use a telephone visit for your medical care today? Yes    The pt's back was hurting the other day.  She got an azo pills and test from [employer] and took this.  She says the test showed she might have a UTI.  Her urine was orange when she dipped the sample.  She had mild dysuria but feels ok now.  No hematuria    Objective   Vital Signs:   There were no vitals taken for this visit.    Physical Exam  Constitutional:       General: She is not in acute distress.  Pulmonary:      Effort: No respiratory distress.   Neurological:      Mental Status: She is alert and oriented to person, place, and time.   Psychiatric:         Behavior: Behavior normal.         Thought Content: Thought content normal.         Judgment: Judgment normal.        Result Review :   The following data was reviewed by: Ana Luisa Diaz MD on 01/13/2021:                Assessment and Plan    Problem List Items Addressed This Visit     None      Visit Diagnoses     Dysuria    -  Primary    Relevant Orders    Urine Culture - Urine, Urine, Clean Catch   Urine dip likely inaccurate since it was done after pt took azo.  Will send urine culture and start abx if appropriate.     I spent 15 minutes caring for Adali on this date of service. This time includes time spent by me in the following activities:preparing for the visit, reviewing tests, obtaining and/or reviewing a separately obtained history, counseling and educating the patient/family/caregiver, ordering medications, tests, or  procedures and documenting information in the medical record  Follow Up   Return if symptoms worsen or fail to improve.  Patient was given instructions and counseling regarding her condition or for health maintenance advice. Please see specific information pulled into the AVS if appropriate.

## 2021-01-14 DIAGNOSIS — R30.0 DYSURIA: Primary | ICD-10-CM

## 2021-01-14 LAB
BILIRUB BLD-MCNC: NEGATIVE MG/DL
CLARITY, POC: ABNORMAL
COLOR UR: YELLOW
GLUCOSE UR STRIP-MCNC: NEGATIVE MG/DL
KETONES UR QL: ABNORMAL
LEUKOCYTE EST, POC: NEGATIVE
NITRITE UR-MCNC: NEGATIVE MG/ML
PH UR: 6 [PH] (ref 5–8)
PROT UR STRIP-MCNC: NEGATIVE MG/DL
RBC # UR STRIP: ABNORMAL /UL
SP GR UR: 1.01 (ref 1–1.03)
UROBILINOGEN UR QL: NORMAL

## 2021-01-14 PROCEDURE — 81003 URINALYSIS AUTO W/O SCOPE: CPT | Performed by: INTERNAL MEDICINE

## 2021-02-10 DIAGNOSIS — I10 ESSENTIAL HYPERTENSION: ICD-10-CM

## 2021-02-10 RX ORDER — HYDROCHLOROTHIAZIDE 25 MG/1
TABLET ORAL
Qty: 90 TABLET | Refills: 0 | Status: SHIPPED | OUTPATIENT
Start: 2021-02-10 | End: 2021-03-02

## 2021-02-15 DIAGNOSIS — I10 ESSENTIAL HYPERTENSION: ICD-10-CM

## 2021-02-16 RX ORDER — LISINOPRIL 40 MG/1
TABLET ORAL
Qty: 90 TABLET | Refills: 1 | Status: SHIPPED | OUTPATIENT
Start: 2021-02-16 | End: 2021-03-02 | Stop reason: SDUPTHER

## 2021-02-22 ENCOUNTER — TELEPHONE (OUTPATIENT)
Dept: INTERNAL MEDICINE | Facility: CLINIC | Age: 76
End: 2021-02-22

## 2021-02-22 NOTE — TELEPHONE ENCOUNTER
Adali Hankins     Caller: PATIENT     Relationship: SELF   Best call back number: [phone]    What medication are you requesting: DID NOT SPECIFY     What are your current symptoms: LOW BLOOD PRESSURE READING     How long have you been experiencing symptoms: A FEW WEEKS     Have you had these symptoms before:    [x] Yes  [] No    Have you been treated for these symptoms before:   [x] Yes  [] No    If a prescription is needed, what is your preferred pharmacy and phone number:      Additional notes:    2/13/2021 106/53   HEART RATE 91  2/13/2021  112/61 HEART RATE 87  2/13/2021   104/55  HEART RATE 92    2/14/2021 87/43  HEART RATE 91  2/15/2021  11/45 HEART RATE 75  2/17/2021 123/62 HEART RATE (BEFORE TAKING BLOOD PRESSURE MEDICATION)    2/18/2021 127/71  HEART RATE 93  2/19/2021  141/67 HEART RATE 69 (BEFORE TAKING BLOOD PRESSURE MEDICATION)    2/19/2021 124/61 HEART RATE 76  2/20/2021 121/61 HEART RATE 80  2/20/2021 100/53 HEART RATE 94  2/21/2021 104/56 HEART RATE 84  2/21/2021  115/60 HEART RATE 94  2/21/2021 110/53 HEART RATE 89      79/43 HEART RATE 94(DOES NOT HAVE DATE FOR THIS READING)

## 2021-02-22 NOTE — TELEPHONE ENCOUNTER
Cut lisinopril in half to 20mg daily.  Pt needs OV/labs next week.  Last OV and labs for htn was Jan 2020.  OV in January 2021 was telehealth for UTI.

## 2021-02-24 DIAGNOSIS — Z00.00 MEDICARE ANNUAL WELLNESS VISIT, INITIAL: Primary | ICD-10-CM

## 2021-02-24 DIAGNOSIS — Z00.00 MEDICARE ANNUAL WELLNESS VISIT, SUBSEQUENT: ICD-10-CM

## 2021-02-26 LAB
ALBUMIN SERPL-MCNC: 4.6 G/DL (ref 3.5–5.2)
ALBUMIN/GLOB SERPL: 2 G/DL
ALP SERPL-CCNC: 49 U/L (ref 39–117)
ALT SERPL-CCNC: 11 U/L (ref 1–33)
AST SERPL-CCNC: 20 U/L (ref 1–32)
BASOPHILS # BLD AUTO: 0.03 10*3/MM3 (ref 0–0.2)
BASOPHILS NFR BLD AUTO: 0.9 % (ref 0–1.5)
BILIRUB SERPL-MCNC: 0.6 MG/DL (ref 0–1.2)
BUN SERPL-MCNC: 11 MG/DL (ref 8–23)
BUN/CREAT SERPL: 14.7 (ref 7–25)
CALCIUM SERPL-MCNC: 9.9 MG/DL (ref 8.6–10.5)
CHLORIDE SERPL-SCNC: 93 MMOL/L (ref 98–107)
CHOLEST SERPL-MCNC: 244 MG/DL (ref 0–200)
CHOLEST/HDLC SERPL: 2.22 {RATIO}
CO2 SERPL-SCNC: 28.7 MMOL/L (ref 22–29)
CREAT SERPL-MCNC: 0.75 MG/DL (ref 0.57–1)
EOSINOPHIL # BLD AUTO: 0.02 10*3/MM3 (ref 0–0.4)
EOSINOPHIL NFR BLD AUTO: 0.6 % (ref 0.3–6.2)
ERYTHROCYTE [DISTWIDTH] IN BLOOD BY AUTOMATED COUNT: 12 % (ref 12.3–15.4)
GLOBULIN SER CALC-MCNC: 2.3 GM/DL
GLUCOSE SERPL-MCNC: 99 MG/DL (ref 65–99)
HBA1C MFR BLD: 5.5 % (ref 4.8–5.6)
HCT VFR BLD AUTO: 39.2 % (ref 34–46.6)
HDLC SERPL-MCNC: 110 MG/DL (ref 40–60)
HGB BLD-MCNC: 13.4 G/DL (ref 12–15.9)
IMM GRANULOCYTES # BLD AUTO: 0 10*3/MM3 (ref 0–0.05)
IMM GRANULOCYTES NFR BLD AUTO: 0 % (ref 0–0.5)
LDLC SERPL CALC-MCNC: 122 MG/DL (ref 0–100)
LYMPHOCYTES # BLD AUTO: 0.91 10*3/MM3 (ref 0.7–3.1)
LYMPHOCYTES NFR BLD AUTO: 28.4 % (ref 19.6–45.3)
MCH RBC QN AUTO: 31.5 PG (ref 26.6–33)
MCHC RBC AUTO-ENTMCNC: 34.2 G/DL (ref 31.5–35.7)
MCV RBC AUTO: 92.2 FL (ref 79–97)
MONOCYTES # BLD AUTO: 0.42 10*3/MM3 (ref 0.1–0.9)
MONOCYTES NFR BLD AUTO: 13.1 % (ref 5–12)
NEUTROPHILS # BLD AUTO: 1.82 10*3/MM3 (ref 1.7–7)
NEUTROPHILS NFR BLD AUTO: 57 % (ref 42.7–76)
NRBC BLD AUTO-RTO: 0 /100 WBC (ref 0–0.2)
PLATELET # BLD AUTO: 190 10*3/MM3 (ref 140–450)
POTASSIUM SERPL-SCNC: 3.7 MMOL/L (ref 3.5–5.2)
PROT SERPL-MCNC: 6.9 G/DL (ref 6–8.5)
RBC # BLD AUTO: 4.25 10*6/MM3 (ref 3.77–5.28)
SODIUM SERPL-SCNC: 133 MMOL/L (ref 136–145)
T4 FREE SERPL-MCNC: 1.42 NG/DL (ref 0.93–1.7)
TRIGL SERPL-MCNC: 75 MG/DL (ref 0–150)
TSH SERPL DL<=0.005 MIU/L-ACNC: 1.15 UIU/ML (ref 0.27–4.2)
VLDLC SERPL CALC-MCNC: 12 MG/DL (ref 5–40)
WBC # BLD AUTO: 3.2 10*3/MM3 (ref 3.4–10.8)

## 2021-03-02 ENCOUNTER — OFFICE VISIT (OUTPATIENT)
Dept: INTERNAL MEDICINE | Facility: CLINIC | Age: 76
End: 2021-03-02

## 2021-03-02 VITALS
SYSTOLIC BLOOD PRESSURE: 104 MMHG | BODY MASS INDEX: 25.13 KG/M2 | WEIGHT: 128 LBS | HEIGHT: 60 IN | DIASTOLIC BLOOD PRESSURE: 70 MMHG | RESPIRATION RATE: 16 BRPM | HEART RATE: 101 BPM | TEMPERATURE: 96.9 F | OXYGEN SATURATION: 99 %

## 2021-03-02 DIAGNOSIS — I10 ESSENTIAL HYPERTENSION: ICD-10-CM

## 2021-03-02 DIAGNOSIS — E87.1 HYPONATREMIA: ICD-10-CM

## 2021-03-02 DIAGNOSIS — Z12.31 ENCOUNTER FOR SCREENING MAMMOGRAM FOR MALIGNANT NEOPLASM OF BREAST: ICD-10-CM

## 2021-03-02 DIAGNOSIS — Z00.00 MEDICARE ANNUAL WELLNESS VISIT, SUBSEQUENT: Primary | ICD-10-CM

## 2021-03-02 DIAGNOSIS — E78.5 HYPERLIPIDEMIA, UNSPECIFIED HYPERLIPIDEMIA TYPE: ICD-10-CM

## 2021-03-02 PROCEDURE — 1126F AMNT PAIN NOTED NONE PRSNT: CPT | Performed by: INTERNAL MEDICINE

## 2021-03-02 PROCEDURE — 99214 OFFICE O/P EST MOD 30 MIN: CPT | Performed by: INTERNAL MEDICINE

## 2021-03-02 PROCEDURE — 1170F FXNL STATUS ASSESSED: CPT | Performed by: INTERNAL MEDICINE

## 2021-03-02 PROCEDURE — G0439 PPPS, SUBSEQ VISIT: HCPCS | Performed by: INTERNAL MEDICINE

## 2021-03-02 PROCEDURE — 1159F MED LIST DOCD IN RCRD: CPT | Performed by: INTERNAL MEDICINE

## 2021-03-02 PROCEDURE — 96160 PT-FOCUSED HLTH RISK ASSMT: CPT | Performed by: INTERNAL MEDICINE

## 2021-03-02 RX ORDER — LISINOPRIL 20 MG/1
40 TABLET ORAL DAILY
Qty: 90 TABLET | Refills: 1 | Status: SHIPPED | OUTPATIENT
Start: 2021-03-02 | End: 2021-03-02

## 2021-03-02 RX ORDER — LISINOPRIL 20 MG/1
20 TABLET ORAL DAILY
Qty: 90 TABLET | Refills: 1 | Status: SHIPPED | OUTPATIENT
Start: 2021-03-02 | End: 2021-07-07

## 2021-03-02 NOTE — PROGRESS NOTES
Adali Hankins is a 75 y.o. female, who presents with a chief complaint of   Chief Complaint   Patient presents with   • Medicare Wellness-subsequent   • Hypertension   • Hyperlipidemia   • Prediabetes           HPI   PT here for follow up and medicare wellness    HTN - bp normal.  No ha/dizziness.   She is on hctz and lisinopril 20mg.       HDL - on statin.   No cramps/myalgias.     Sodium down slightly today - pt on hctz.  Pt asymptomatic    The following portions of the patient's history were reviewed and updated as appropriate: allergies, current medications, past family history, past medical history, past social history, past surgical history and problem list.    Allergies: Patient has no known allergies.    Review of Systems   Constitutional: Negative.    HENT: Negative.    Eyes: Negative.    Respiratory: Negative.    Cardiovascular: Negative.    Gastrointestinal: Negative.    Endocrine: Negative.    Genitourinary: Negative.    Musculoskeletal: Negative.    Skin: Negative.    Allergic/Immunologic: Negative.    Neurological: Negative.    Hematological: Negative.    Psychiatric/Behavioral: Negative.    All other systems reviewed and are negative.            Wt Readings from Last 3 Encounters:   03/02/21 58.1 kg (128 lb)   03/24/20 55.3 kg (122 lb)   02/05/20 54.4 kg (120 lb)     Temp Readings from Last 3 Encounters:   03/02/21 96.9 °F (36.1 °C) (Temporal)   03/24/20 97.9 °F (36.6 °C) (Oral)   02/05/20 97.6 °F (36.4 °C) (Oral)     BP Readings from Last 3 Encounters:   03/02/21 104/70   03/24/20 136/78   02/05/20 140/84     Pulse Readings from Last 3 Encounters:   03/02/21 101   03/24/20 104   02/05/20 86     Body mass index is 25 kg/m².  SpO2 Readings from Last 3 Encounters:   03/02/21 99%   03/24/20 97%   02/05/20 98%          Physical Exam    Results for orders placed or performed in visit on 02/24/21   Comprehensive metabolic panel    Specimen: Blood   Result Value Ref Range    Glucose 99 65 - 99 mg/dL     BUN 11 8 - 23 mg/dL    Creatinine 0.75 0.57 - 1.00 mg/dL    eGFR Non African Am 75 >60 mL/min/1.73    eGFR African Am 91 >60 mL/min/1.73    BUN/Creatinine Ratio 14.7 7.0 - 25.0    Sodium 133 (L) 136 - 145 mmol/L    Potassium 3.7 3.5 - 5.2 mmol/L    Chloride 93 (L) 98 - 107 mmol/L    Total CO2 28.7 22.0 - 29.0 mmol/L    Calcium 9.9 8.6 - 10.5 mg/dL    Total Protein 6.9 6.0 - 8.5 g/dL    Albumin 4.60 3.50 - 5.20 g/dL    Globulin 2.3 gm/dL    A/G Ratio 2.0 g/dL    Total Bilirubin 0.6 0.0 - 1.2 mg/dL    Alkaline Phosphatase 49 39 - 117 U/L    AST (SGOT) 20 1 - 32 U/L    ALT (SGPT) 11 1 - 33 U/L   Hemoglobin A1c    Specimen: Blood   Result Value Ref Range    Hemoglobin A1C 5.50 4.80 - 5.60 %   Lipid Panel w/ Chol/HDL Ratio    Specimen: Blood   Result Value Ref Range    Total Cholesterol 244 (H) 0 - 200 mg/dL    Triglycerides 75 0 - 150 mg/dL    HDL Cholesterol 110 (H) 40 - 60 mg/dL    VLDL Cholesterol Dilshad 12 5 - 40 mg/dL    LDL Chol Calc (NIH) 122 (H) 0 - 100 mg/dL    Chol/HDL Ratio 2.22    T4, free    Specimen: Blood   Result Value Ref Range    Free T4 1.42 0.93 - 1.70 ng/dL   TSH    Specimen: Blood   Result Value Ref Range    TSH 1.150 0.270 - 4.200 uIU/mL   CBC w AUTO Differential    Specimen: Blood   Result Value Ref Range    WBC 3.20 (L) 3.40 - 10.80 10*3/mm3    RBC 4.25 3.77 - 5.28 10*6/mm3    Hemoglobin 13.4 12.0 - 15.9 g/dL    Hematocrit 39.2 34.0 - 46.6 %    MCV 92.2 79.0 - 97.0 fL    MCH 31.5 26.6 - 33.0 pg    MCHC 34.2 31.5 - 35.7 g/dL    RDW 12.0 (L) 12.3 - 15.4 %    Platelets 190 140 - 450 10*3/mm3    Neutrophil Rel % 57.0 42.7 - 76.0 %    Lymphocyte Rel % 28.4 19.6 - 45.3 %    Monocyte Rel % 13.1 (H) 5.0 - 12.0 %    Eosinophil Rel % 0.6 0.3 - 6.2 %    Basophil Rel % 0.9 0.0 - 1.5 %    Neutrophils Absolute 1.82 1.70 - 7.00 10*3/mm3    Lymphocytes Absolute 0.91 0.70 - 3.10 10*3/mm3    Monocytes Absolute 0.42 0.10 - 0.90 10*3/mm3    Eosinophils Absolute 0.02 0.00 - 0.40 10*3/mm3    Basophils Absolute 0.03 0.00  - 0.20 10*3/mm3    Immature Granulocyte Rel % 0.0 0.0 - 0.5 %    Immature Grans Absolute 0.00 0.00 - 0.05 10*3/mm3    nRBC 0.0 0.0 - 0.2 /100 WBC     Result Review :                  Assessment and Plan    Diagnoses and all orders for this visit:    1. Medicare annual wellness visit, subsequent (Primary)    2. Encounter for screening mammogram for malignant neoplasm of breast  -     Mammo Screening Bilateral With CAD; Future    3. Essential hypertension  -     lisinopril (PRINIVIL,ZESTRIL) 20 MG tablet; Take 1 tablet by mouth Daily.  Dispense: 90 tablet; Refill: 1    4. Hyperlipidemia, unspecified hyperlipidemia type    5. Hyponatremia will stop hctz at this time to see if this helps sodium levels.   Cont lisinopril.  bp well controlled. Pt has a bp cuff at home and will check bp and keep log at home.           Outpatient Medications Prior to Visit   Medication Sig Dispense Refill   • aspirin 81 MG tablet Take 81 mg by mouth daily.     • CALCIUM CITRATE-VITAMIN D PO      • Cranberry-Vitamin C (AZO CRANBERRY URINARY TRACT PO) Take  by mouth 2 (Two) Times a Day.     • fluticasone (FLONASE) 50 MCG/ACT nasal spray USE 2 SPRAYS IN EACH NOSTRIL ONE TIME DAILY AS DIRECTED 48 g 2   • Ibuprofen 200 MG capsule      • Magnesium 500 MG capsule Take  by mouth.     • rosuvastatin (CRESTOR) 5 MG tablet TAKE 1 TABLET EVERY OTHER DAY 45 tablet 3   • vitamin B-12 (CYANOCOBALAMIN) 1000 MCG tablet Take 1,000 mcg by mouth Daily.     • hydroCHLOROthiazide (HYDRODIURIL) 25 MG tablet TAKE 1 TABLET EVERY DAY 90 tablet 0   • lisinopril (PRINIVIL,ZESTRIL) 40 MG tablet TAKE 1 TABLET EVERY DAY (Patient taking differently: 20 mg.) 90 tablet 1   • azithromycin (ZITHROMAX) 250 MG tablet Take 2 tablets the first day, then 1 tablet daily for 4 days. 6 tablet 0   • FOLIC ACID  mg.     • vitamin B-6 (PYRIDOXINE) 50 MG tablet Take 50 mg by mouth Daily.       No facility-administered medications prior to visit.      New Medications Ordered This  Visit   Medications   • lisinopril (PRINIVIL,ZESTRIL) 20 MG tablet     Sig: Take 2 tablets by mouth Daily.     Dispense:  90 tablet     Refill:  1     [unfilled]  Medications Discontinued During This Encounter   Medication Reason   • lisinopril (PRINIVIL,ZESTRIL) 40 MG tablet Reorder   • hydroCHLOROthiazide (HYDRODIURIL) 25 MG tablet    • azithromycin (ZITHROMAX) 250 MG tablet *Therapy completed   • FOLIC ACID PO *Therapy completed   • vitamin B-6 (PYRIDOXINE) 50 MG tablet *Therapy completed         Return in about 6 months (around 9/2/2021) for Recheck, labs.    Patient was given instructions and counseling regarding her condition or for health maintenance advice. Please see specific information pulled into the AVS if appropriate.

## 2021-03-02 NOTE — PROGRESS NOTES
The ABCs of the Annual Wellness Visit  Subsequent Medicare Wellness Visit    Chief Complaint   Patient presents with   • Medicare Wellness-subsequent   • Hypertension   • Hyperlipidemia   • Prediabetes       Subjective   History of Present Illness:  Adali Hankins is a 75 y.o. female who presents for a Subsequent Medicare Wellness Visit.    HEALTH RISK ASSESSMENT    Recent Hospitalizations:  No hospitalization(s) within the last year.    Current Medical Providers:  Patient Care Team:  Ana Luisa Diaz MD as PCP - General  León Duggan MD as Consulting Physician (Urology)    Smoking Status:  Social History     Tobacco Use   Smoking Status Former Smoker       Alcohol Consumption:  Social History     Substance and Sexual Activity   Alcohol Use No       Depression Screen:   PHQ-2/PHQ-9 Depression Screening 3/2/2021   Little interest or pleasure in doing things 0   Feeling down, depressed, or hopeless 0   Total Score 0       Fall Risk Screen:  STEADI Fall Risk Assessment was completed, and patient is at LOW risk for falls.Assessment completed on:3/2/2021    Health Habits and Functional and Cognitive Screening:  Functional & Cognitive Status 3/2/2021   Do you have difficulty preparing food and eating? No   Do you have difficulty bathing yourself, getting dressed or grooming yourself? No   Do you have difficulty using the toilet? No   Do you have difficulty moving around from place to place? No   Do you have trouble with steps or getting out of a bed or a chair? No   Current Diet Well Balanced Diet   Dental Exam Not up to date   Eye Exam Up to date        Eye Exam Comment 8/20   Exercise (times per week) 4 times per week   Current Exercise Activities Include Yoga   Do you need help using the phone?  No   Are you deaf or do you have serious difficulty hearing?  Yes   Do you need help with transportation? No   Do you need help shopping? No   Do you need help preparing meals?  No   Do you need help with  housework?  No   Do you need help with laundry? No   Do you need help taking your medications? No   Do you need help managing money? No   Do you ever drive or ride in a car without wearing a seat belt? No   Have you felt unusual stress, anger or loneliness in the last month? No   Who do you live with? Spouse   If you need help, do you have trouble finding someone available to you? No   Have you been bothered in the last four weeks by sexual problems? No   Do you have difficulty concentrating, remembering or making decisions? No         Does the patient have evidence of cognitive impairment? No    Asprin use counseling:Does not need ASA (and currently is not on it)    Age-appropriate Screening Schedule:  Refer to the list below for future screening recommendations based on patient's age, sex and/or medical conditions. Orders for these recommended tests are listed in the plan section. The patient has been provided with a written plan.    Health Maintenance   Topic Date Due   • MAMMOGRAM  04/01/2021 (Originally 2/18/2021)   • ZOSTER VACCINE (2 of 2) 03/11/2022 (Originally 2/7/2018)   • DXA SCAN  02/18/2022   • LIPID PANEL  02/25/2022   • TDAP/TD VACCINES (2 - Td) 08/07/2025   • COLONOSCOPY  12/13/2027   • INFLUENZA VACCINE  Addressed          The following portions of the patient's history were reviewed and updated as appropriate: allergies, current medications, past family history, past medical history, past social history, past surgical history and problem list.    Outpatient Medications Prior to Visit   Medication Sig Dispense Refill   • aspirin 81 MG tablet Take 81 mg by mouth daily.     • CALCIUM CITRATE-VITAMIN D PO      • Cranberry-Vitamin C (AZO CRANBERRY URINARY TRACT PO) Take  by mouth 2 (Two) Times a Day.     • fluticasone (FLONASE) 50 MCG/ACT nasal spray USE 2 SPRAYS IN EACH NOSTRIL ONE TIME DAILY AS DIRECTED 48 g 2   • hydroCHLOROthiazide (HYDRODIURIL) 25 MG tablet TAKE 1 TABLET EVERY DAY 90 tablet 0   •  "Ibuprofen 200 MG capsule      • lisinopril (PRINIVIL,ZESTRIL) 40 MG tablet TAKE 1 TABLET EVERY DAY (Patient taking differently: 20 mg.) 90 tablet 1   • Magnesium 500 MG capsule Take  by mouth.     • rosuvastatin (CRESTOR) 5 MG tablet TAKE 1 TABLET EVERY OTHER DAY 45 tablet 3   • vitamin B-12 (CYANOCOBALAMIN) 1000 MCG tablet Take 1,000 mcg by mouth Daily.     • azithromycin (ZITHROMAX) 250 MG tablet Take 2 tablets the first day, then 1 tablet daily for 4 days. 6 tablet 0   • FOLIC ACID  mg.     • vitamin B-6 (PYRIDOXINE) 50 MG tablet Take 50 mg by mouth Daily.       No facility-administered medications prior to visit.        Patient Active Problem List   Diagnosis   • Hyperlipidemia   • Hyperglycemia   • Hypertension   • Urinary retention   • Urethral cyst   • HTN, white coat   • Menopausal vaginal dryness   • Osteopenia of left hip   • Hearing loss due to cerumen impaction, bilateral   • Injury of nail bed of toe   • Acute URI       Advanced Care Planning:  ACP discussion was held with the patient during this visit. Patient has an advance directive (not in EMR), copy requested.    Review of Systems    Compared to one year ago, the patient feels her physical health is the same.  Compared to one year ago, the patient feels her mental health is the same.    Reviewed chart for potential of high risk medication in the elderly: yes  Reviewed chart for potential of harmful drug interactions in the elderly:yes    Objective         Vitals:    03/02/21 1449   BP: 104/70   BP Location: Left arm   Patient Position: Sitting   Cuff Size: Large Adult   Pulse: 101   Resp: 16   Temp: 96.9 °F (36.1 °C)   TempSrc: Temporal   SpO2: 99%   Weight: 58.1 kg (128 lb)   Height: 152.4 cm (60\")       Body mass index is 25 kg/m².  Discussed the patient's BMI with her. The BMI is in the acceptable range.    Physical Exam    Lab Results   Component Value Date    GLU 99 02/25/2021    CHLPL 244 (H) 02/25/2021    TRIG 75 02/25/2021     " (H) 02/25/2021     (H) 02/25/2021    VLDL 12 02/25/2021    HGBA1C 5.50 02/25/2021        Assessment/Plan   Medicare Risks and Personalized Health Plan  CMS Preventative Services Quick Reference  Advance Directive Discussion  Breast Cancer/Mammogram Screening  Colon Cancer Screening - pt will look at records at home to see when scope done  Immunizations Discussed/Encouraged (specific immunizations; pt declines all vaccinations )  Osteoprorosis Risk  Polypharmacy    The above risks/problems have been discussed with the patient.  Pertinent information has been shared with the patient in the After Visit Summary.  Follow up plans and orders are seen below in the Assessment/Plan Section.    Diagnoses and all orders for this visit:    1. Medicare annual wellness visit, subsequent (Primary)    2. Encounter for screening mammogram for malignant neoplasm of breast  -     Mammo Screening Bilateral With CAD; Future    3. Essential hypertension    4. Hyperlipidemia, unspecified hyperlipidemia type      Follow Up:  No follow-ups on file.     An After Visit Summary and PPPS were given to the patient.

## 2021-03-02 NOTE — PATIENT INSTRUCTIONS
Advance Care Planning and Advance Directives     You make decisions on a daily basis - decisions about where you want to live, your career, your home, your life. Perhaps one of the most important decisions you face is your choice for future medical care. Take time to talk with your family and your healthcare team and start planning today.  Advance Care Planning is a process that can help you:  · Understand possible future healthcare decisions in light of your own experiences  · Reflect on those decision in light of your goals and values  · Discuss your decisions with those closest to you and the healthcare professionals that care for you  · Make a plan by creating a document that reflects your wishes    Surrogate Decision Maker  In the event of a medical emergency, which has left you unable to communicate or to make your own decisions, you would need someone to make decisions for you.  It is important to discuss your preferences for medical treatment with this person while you are in good health.     Qualities of a surrogate decision maker:  • Willing to take on this role and responsibility  • Knows what you want for future medical care  • Willing to follow your wishes even if they don't agree with them  • Able to make difficult medical decisions under stressful circumstances    Advance Directives  These are legal documents you can create that will guide your healthcare team and decision maker(s) when needed. These documents can be stored in the electronic medical record.    · Living Will - a legal document to guide your care if you have a terminal condition or a serious illness and are unable to communicate. States vary by statute in document names/types, but most forms may include one or more of the following:        -  Directions regarding life-prolonging treatments        -  Directions regarding artificially provided nutrition/hydration        -  Choosing a healthcare decision maker        -  Direction  regarding organ/tissue donation    · Durable Power of  for Healthcare - this document names an -in-fact to make medical decisions for you, but it may also allow this person to make personal and financial decisions for you. Please seek the advice of an  if you need this type of document.    **Advance Directives are not required and no one may discriminate against you if you do not sign one.    Medical Orders  Many states allow specific forms/orders signed by your physician to record your wishes for medical treatment in your current state of health. This form, signed in personal communication with your physician, addresses resuscitation and other medical interventions that you may or may not want.      For more information or to schedule a time with a [facility] Advance Care Planning Facilitator contact: [employer] or call [phone] and someone will contact you directly.      Medicare Wellness  Personal Prevention Plan of Service     Date of Office Visit:  2021  Encounter Provider:  Ana Luisa Diaz MD  Place of Service:  [facility]  Patient Name: Adali Hankins  :  1945    As part of the Medicare Wellness portion of your visit today, we are providing you with this personalized preventive plan of services (PPPS). This plan is based upon recommendations of the [place] Preventive Services Task Force (USPSTF) and the Advisory Committee on Immunization Practices (ACIP).    This lists the preventive care services that should be considered, and provides dates of when you are due. Items listed as completed are up-to-date and do not require any further intervention.    Health Maintenance   Topic Date Due   • HEPATITIS C SCREENING  2016   • COVID-19 Vaccine (1 of 2) 2021 (Originally 10/10/1961)   • MAMMOGRAM  2021 (Originally 2021)   • ZOSTER VACCINE (2 of 2) 2022 (Originally 2018)   • DXA SCAN   02/18/2022   • LIPID PANEL  02/25/2022   • ANNUAL WELLNESS VISIT  03/02/2022   • TDAP/TD VACCINES (2 - Td) 08/07/2025   • COLONOSCOPY  12/13/2027   • INFLUENZA VACCINE  Addressed   • Pneumococcal Vaccine 65+  Addressed   • MENINGOCOCCAL VACCINE  Aged Out       Orders Placed This Encounter   Procedures   • Mammo Screening Bilateral With CAD     Standing Status:   Future     Standing Expiration Date:   3/2/2022     Order Specific Question:   Reason for Exam:     Answer:   screen for breast cancer       Return in about 6 months (around 9/2/2021) for Recheck, labs.

## 2021-03-05 ENCOUNTER — TELEPHONE (OUTPATIENT)
Dept: INTERNAL MEDICINE | Facility: CLINIC | Age: 76
End: 2021-03-05

## 2021-03-05 NOTE — TELEPHONE ENCOUNTER
Taniya with [employer] called in to clarify directions for lisinopril (PRINIVIL,ZESTRIL) 20 MG tablet    Best call back # [phone]  Ref # [id number]

## 2021-03-05 NOTE — TELEPHONE ENCOUNTER
Advised Carlos Alberto Martell, that patient is taking Lisinopril 20 mg once daily.  # 90 with 1 RF.

## 2021-03-09 ENCOUNTER — TRANSCRIBE ORDERS (OUTPATIENT)
Dept: ADMINISTRATIVE | Facility: HOSPITAL | Age: 76
End: 2021-03-09

## 2021-03-09 DIAGNOSIS — Z12.31 SCREENING MAMMOGRAM, ENCOUNTER FOR: Primary | ICD-10-CM

## 2021-03-15 ENCOUNTER — HOSPITAL ENCOUNTER (OUTPATIENT)
Dept: MAMMOGRAPHY | Facility: HOSPITAL | Age: 76
Discharge: HOME | End: 2021-03-15
Admitting: INTERNAL MEDICINE

## 2021-03-15 DIAGNOSIS — Z12.31 SCREENING MAMMOGRAM, ENCOUNTER FOR: ICD-10-CM

## 2021-03-15 PROCEDURE — 77063 BREAST TOMOSYNTHESIS BI: CPT

## 2021-03-15 PROCEDURE — 77067 SCR MAMMO BI INCL CAD: CPT

## 2021-07-07 DIAGNOSIS — I10 ESSENTIAL HYPERTENSION: ICD-10-CM

## 2021-07-07 RX ORDER — LISINOPRIL 20 MG/1
TABLET ORAL
Qty: 90 TABLET | Refills: 1 | Status: SHIPPED | OUTPATIENT
Start: 2021-07-07 | End: 2021-11-29

## 2021-09-19 ENCOUNTER — HOSPITAL ENCOUNTER (EMERGENCY)
Facility: HOSPITAL | Age: 76
Discharge: HOME | End: 2021-09-19
Attending: EMERGENCY MEDICINE | Admitting: EMERGENCY MEDICINE

## 2021-09-19 ENCOUNTER — APPOINTMENT (OUTPATIENT)
Dept: GENERAL RADIOLOGY | Facility: HOSPITAL | Age: 76
End: 2021-09-19

## 2021-09-19 VITALS
BODY MASS INDEX: 24.74 KG/M2 | HEIGHT: 60 IN | OXYGEN SATURATION: 98 % | TEMPERATURE: 97.8 F | RESPIRATION RATE: 18 BRPM | DIASTOLIC BLOOD PRESSURE: 84 MMHG | SYSTOLIC BLOOD PRESSURE: 159 MMHG | HEART RATE: 91 BPM | WEIGHT: 126 LBS

## 2021-09-19 DIAGNOSIS — R55 SYNCOPE, UNSPECIFIED SYNCOPE TYPE: Primary | ICD-10-CM

## 2021-09-19 LAB
ANION GAP SERPL CALCULATED.3IONS-SCNC: 12.2 MMOL/L (ref 5–15)
BACTERIA UR QL AUTO: ABNORMAL /HPF
BASOPHILS # BLD AUTO: 0.02 10*3/MM3 (ref 0–0.2)
BASOPHILS NFR BLD AUTO: 0.6 % (ref 0–1.5)
BILIRUB UR QL STRIP: NEGATIVE
BUN SERPL-MCNC: 16 MG/DL (ref 8–23)
BUN/CREAT SERPL: 13.9 (ref 7–25)
CALCIUM SPEC-SCNC: 9.6 MG/DL (ref 8.6–10.5)
CHLORIDE SERPL-SCNC: 98 MMOL/L (ref 98–107)
CLARITY UR: CLEAR
CLUMPED PLATELETS: PRESENT
CO2 SERPL-SCNC: 25.8 MMOL/L (ref 22–29)
COLOR UR: YELLOW
CREAT SERPL-MCNC: 1.15 MG/DL (ref 0.57–1)
DEPRECATED RDW RBC AUTO: 42.3 FL (ref 37–54)
EOSINOPHIL # BLD AUTO: 0.02 10*3/MM3 (ref 0–0.4)
EOSINOPHIL NFR BLD AUTO: 0.6 % (ref 0.3–6.2)
ERYTHROCYTE [DISTWIDTH] IN BLOOD BY AUTOMATED COUNT: 12.8 % (ref 12.3–15.4)
ETHANOL BLD-MCNC: <10 MG/DL (ref 0–10)
ETHANOL UR QL: <0.01 %
GFR SERPL CREATININE-BSD FRML MDRD: 46 ML/MIN/1.73
GLUCOSE SERPL-MCNC: 125 MG/DL (ref 65–99)
GLUCOSE UR STRIP-MCNC: NEGATIVE MG/DL
HCT VFR BLD AUTO: 36.9 % (ref 34–46.6)
HGB BLD-MCNC: 12.3 G/DL (ref 12–15.9)
HGB UR QL STRIP.AUTO: ABNORMAL
HYALINE CASTS UR QL AUTO: ABNORMAL /LPF
IMM GRANULOCYTES # BLD AUTO: 0.01 10*3/MM3 (ref 0–0.05)
IMM GRANULOCYTES NFR BLD AUTO: 0.3 % (ref 0–0.5)
KETONES UR QL STRIP: NEGATIVE
LEUKOCYTE ESTERASE UR QL STRIP.AUTO: NEGATIVE
LYMPHOCYTES # BLD AUTO: 1 10*3/MM3 (ref 0.7–3.1)
LYMPHOCYTES NFR BLD AUTO: 28.4 % (ref 19.6–45.3)
MCH RBC QN AUTO: 30.4 PG (ref 26.6–33)
MCHC RBC AUTO-ENTMCNC: 33.3 G/DL (ref 31.5–35.7)
MCV RBC AUTO: 91.1 FL (ref 79–97)
MONOCYTES # BLD AUTO: 0.56 10*3/MM3 (ref 0.1–0.9)
MONOCYTES NFR BLD AUTO: 15.9 % (ref 5–12)
NEUTROPHILS NFR BLD AUTO: 1.91 10*3/MM3 (ref 1.7–7)
NEUTROPHILS NFR BLD AUTO: 54.2 % (ref 42.7–76)
NITRITE UR QL STRIP: NEGATIVE
NRBC BLD AUTO-RTO: 0 /100 WBC (ref 0–0.2)
PH UR STRIP.AUTO: 6.5 [PH] (ref 4.5–8)
PLATELET # BLD AUTO: 116 10*3/MM3 (ref 140–450)
PMV BLD AUTO: 10.6 FL (ref 6–12)
POTASSIUM SERPL-SCNC: 4 MMOL/L (ref 3.5–5.2)
PROT UR QL STRIP: NEGATIVE
RBC # BLD AUTO: 4.05 10*6/MM3 (ref 3.77–5.28)
RBC # UR: ABNORMAL /HPF
RBC MORPH BLD: NORMAL
REF LAB TEST METHOD: ABNORMAL
SODIUM SERPL-SCNC: 136 MMOL/L (ref 136–145)
SP GR UR STRIP: 1.01 (ref 1–1.03)
SQUAMOUS #/AREA URNS HPF: ABNORMAL /HPF
TROPONIN T SERPL-MCNC: <0.01 NG/ML (ref 0–0.03)
UROBILINOGEN UR QL STRIP: ABNORMAL
WBC # BLD AUTO: 3.52 10*3/MM3 (ref 3.4–10.8)
WBC MORPH BLD: NORMAL
WBC UR QL AUTO: ABNORMAL /HPF

## 2021-09-19 PROCEDURE — 93010 ELECTROCARDIOGRAM REPORT: CPT | Performed by: INTERNAL MEDICINE

## 2021-09-19 PROCEDURE — 71045 X-RAY EXAM CHEST 1 VIEW: CPT

## 2021-09-19 PROCEDURE — 85007 BL SMEAR W/DIFF WBC COUNT: CPT | Performed by: EMERGENCY MEDICINE

## 2021-09-19 PROCEDURE — 81001 URINALYSIS AUTO W/SCOPE: CPT | Performed by: EMERGENCY MEDICINE

## 2021-09-19 PROCEDURE — 99282 EMERGENCY DEPT VISIT SF MDM: CPT | Performed by: EMERGENCY MEDICINE

## 2021-09-19 PROCEDURE — 80048 BASIC METABOLIC PNL TOTAL CA: CPT | Performed by: EMERGENCY MEDICINE

## 2021-09-19 PROCEDURE — 99283 EMERGENCY DEPT VISIT LOW MDM: CPT

## 2021-09-19 PROCEDURE — 85025 COMPLETE CBC W/AUTO DIFF WBC: CPT | Performed by: EMERGENCY MEDICINE

## 2021-09-19 PROCEDURE — 93005 ELECTROCARDIOGRAM TRACING: CPT | Performed by: EMERGENCY MEDICINE

## 2021-09-19 PROCEDURE — 82077 ASSAY SPEC XCP UR&BREATH IA: CPT | Performed by: EMERGENCY MEDICINE

## 2021-09-19 PROCEDURE — 84484 ASSAY OF TROPONIN QUANT: CPT | Performed by: EMERGENCY MEDICINE

## 2021-09-19 RX ORDER — SODIUM CHLORIDE 0.9 % (FLUSH) 0.9 %
10 SYRINGE (ML) INJECTION AS NEEDED
Status: DISCONTINUED | OUTPATIENT
Start: 2021-09-19 | End: 2021-09-19 | Stop reason: HOSPADM

## 2021-09-19 RX ADMIN — SODIUM CHLORIDE, POTASSIUM CHLORIDE, SODIUM LACTATE AND CALCIUM CHLORIDE 1000 ML: 600; 310; 30; 20 INJECTION, SOLUTION INTRAVENOUS at 15:53

## 2021-09-19 NOTE — DISCHARGE INSTRUCTIONS
Please follow-up with your primary care physician in 2 to 3 days.  Please return to the emergency room if you develop chest pain, shortness of breath, uncontrolled vomiting or for any other concerns.

## 2021-09-19 NOTE — ED PROVIDER NOTES
Subjective   History of Present Illness  75-year-old female with a history of hypertension, hyperlipidemia presents to the emergency room with a chief complaint of syncopal episode.  She says that she was sitting in a chair watching her family work in the yard when she apparently passed out.  The family says that she passed out and had some twitching of her hands and she was only unconscious for a brief period and when she awoke she was not confused so there is no postictal..  She denies any prior chest pain or shortness of breath says that she feels fine today except notes that she was pretty hungry and she did not eat breakfast or lunch.  She says right now she feels fine except for being hungry.  Denies a history of heart failure or dysrhythmias.  Review of Systems   Constitutional: Positive for chills, fatigue and fever.   Respiratory: Negative for chest tightness and shortness of breath.    Cardiovascular: Negative for chest pain and leg swelling.   Genitourinary: Negative for difficulty urinating and dysuria.   Musculoskeletal: Negative for arthralgias.   Neurological: Positive for dizziness, syncope, speech difficulty and weakness.       Past Medical History:   Diagnosis Date   • Arthritis    • History of carcinoma in situ of cervix uteri    • Hyperlipidemia    • Hypertension    • Hypoglycemia    • Urethral cyst 6/23/2017   • Vaginal prolapse        No Known Allergies    Past Surgical History:   Procedure Laterality Date   • BREAST BIOPSY Left     abcess drained surgically   • HERNIA REPAIR     • TUBAL ABDOMINAL LIGATION         Family History   Problem Relation Age of Onset   • Cancer Other         colon   • Hypertension Other    • Breast cancer Neg Hx        Social History     Socioeconomic History   • Marital status:      Spouse name: Not on file   • Number of children: Not on file   • Years of education: Not on file   • Highest education level: Not on file   Tobacco Use   • Smoking status: Former  Smoker   Substance and Sexual Activity   • Alcohol use: No   • Drug use: No   • Sexual activity: Defer           Objective    ED Triage Vitals [09/19/21 1536]   Temp Heart Rate Resp BP SpO2   97.8 °F (36.6 °C) 89 18 143/68 98 %      Temp src Heart Rate Source Patient Position BP Location FiO2 (%)   Oral -- Lying Right arm --       Physical Exam  INITIAL VITAL SIGNS: Reviewed by me.  Pulse ox normal  GENERAL: Alert and interactive. No acute distress.  HEAD: Head is normocephalic.  EYES: EOMI. PERRL. No scleral icterus. No conjunctival injection.  ENT: Moist mucous membranes.   NECK: Supple. Full range of motion.  RESPIRATORY: No tachypnea. Clear breath sounds bilaterally. No wheezing. No rales. No rhonchi.  CV: Regular rate and rhythm. No murmurs. No rubs or gallops.  ABDOMEN: Soft, non-distended, non-tender. No guarding. No rebound. No masses.   BACK:  No obvious deformity.  EXTREMITIES: No deformity. No clubbing or cyanosis. No edema.   SKIN: Warm and dry. No diaphoresis. No obvious rashes.   NEUROLOGIC: Alert and oriented. Face is symmetric. Speech is normal.  There is no dysmetria, moves all extremities equally. Motor and sensory distally intact.  Negative Romberg  Results for orders placed or performed during the hospital encounter of 09/19/21   Basic Metabolic Panel    Specimen: Blood   Result Value Ref Range    Glucose 125 (H) 65 - 99 mg/dL    BUN 16 8 - 23 mg/dL    Creatinine 1.15 (H) 0.57 - 1.00 mg/dL    Sodium 136 136 - 145 mmol/L    Potassium 4.0 3.5 - 5.2 mmol/L    Chloride 98 98 - 107 mmol/L    CO2 25.8 22.0 - 29.0 mmol/L    Calcium 9.6 8.6 - 10.5 mg/dL    eGFR Non African Amer 46 (L) >60 mL/min/1.73    BUN/Creatinine Ratio 13.9 7.0 - 25.0    Anion Gap 12.2 5.0 - 15.0 mmol/L   Urinalysis With Microscopic If Indicated (No Culture) - Urine, Clean Catch    Specimen: Urine, Clean Catch   Result Value Ref Range    Color, UA Yellow Yellow, Straw    Appearance, UA Clear Clear    pH, UA 6.5 4.5 - 8.0    Specific  Gravity, UA 1.015 1.003 - 1.030    Glucose, UA Negative Negative    Ketones, UA Negative Negative    Bilirubin, UA Negative Negative    Blood, UA Trace (A) Negative    Protein, UA Negative Negative    Leuk Esterase, UA Negative Negative    Nitrite, UA Negative Negative    Urobilinogen, UA 0.2 E.U./dL 0.2 - 1.0 E.U./dL   CBC Auto Differential    Specimen: Blood   Result Value Ref Range    WBC 3.52 3.40 - 10.80 10*3/mm3    RBC 4.05 3.77 - 5.28 10*6/mm3    Hemoglobin 12.3 12.0 - 15.9 g/dL    Hematocrit 36.9 34.0 - 46.6 %    MCV 91.1 79.0 - 97.0 fL    MCH 30.4 26.6 - 33.0 pg    MCHC 33.3 31.5 - 35.7 g/dL    RDW 12.8 12.3 - 15.4 %    RDW-SD 42.3 37.0 - 54.0 fl    MPV 10.6 6.0 - 12.0 fL    Platelets 116 (L) 140 - 450 10*3/mm3    Neutrophil % 54.2 42.7 - 76.0 %    Lymphocyte % 28.4 19.6 - 45.3 %    Monocyte % 15.9 (H) 5.0 - 12.0 %    Eosinophil % 0.6 0.3 - 6.2 %    Basophil % 0.6 0.0 - 1.5 %    Immature Grans % 0.3 0.0 - 0.5 %    Neutrophils, Absolute 1.91 1.70 - 7.00 10*3/mm3    Lymphocytes, Absolute 1.00 0.70 - 3.10 10*3/mm3    Monocytes, Absolute 0.56 0.10 - 0.90 10*3/mm3    Eosinophils, Absolute 0.02 0.00 - 0.40 10*3/mm3    Basophils, Absolute 0.02 0.00 - 0.20 10*3/mm3    Immature Grans, Absolute 0.01 0.00 - 0.05 10*3/mm3    nRBC 0.0 0.0 - 0.2 /100 WBC   Ethanol    Specimen: Blood   Result Value Ref Range    Ethanol <10 0 - 10 mg/dL    Ethanol % <0.010 %   Troponin    Specimen: Blood   Result Value Ref Range    Troponin T <0.010 0.000 - 0.030 ng/mL   Scan Slide    Specimen: Blood   Result Value Ref Range    RBC Morphology Normal Normal    WBC Morphology Normal Normal    Clumped Platelets Present None Seen   Urinalysis, Microscopic Only - Urine, Clean Catch    Specimen: Urine, Clean Catch   Result Value Ref Range    RBC, UA 3-5 (A) None Seen /HPF    WBC, UA None Seen None Seen /HPF    Bacteria, UA Trace (A) None Seen /HPF    Squamous Epithelial Cells, UA 0-2 None Seen, 0-2 /HPF    Hyaline Casts, UA None Seen None Seen  /LPF    Methodology Manual Light Microscopy    ECG 12 Lead   Result Value Ref Range    QT Interval 375 ms     XR Chest 1 View    Result Date: 9/19/2021  CR Chest 1 Vw INDICATION: Syncopal episode today. Former smoker. COMPARISON:  None available. FINDINGS: Portable AP view(s) of the chest.  Cardiac silhouette unremarkable. Vascularity is normal. Lungs are clear. There is no pneumothorax. No effusion.     1. Negative chest. Signer Name: Fermin Contreras MD  Signed: 9/19/2021 4:36 PM  Workstation Name: [id number]  [facility]      Procedures      EKG           EKG time/Interp time: 1605/1608  Rhythm/Rate: Sinus rhythm rate of 91  P waves and NV: Normal NV interval  QRS, axis: Normal QRS duration with normal axis  ST and T waves: No ST elevations or depressions, normal interval    Independently interpreted by me contemporaneously with treatment        ED Course  ED Course as of Sep 19 1844   Sun Sep 19, 2021   1551 Very well-appearing 75-year-old female with brief syncopal episode.  She had no prior chest pain or shortness of breath, she felt fine afterwards, no postictal phase.  Still feels fine.  Notes that she did not have anything to eat or drink for breakfast or lunch and she is quite hungry.  On exam she has normal vital signs is in no distress has normal heart and lung sounds with completely normal neurological exam.  Sounds like she had convulsive syncope, no concern for seizure no concern for stroke.    [RO]   1816 Labs are unremarkable, urine without evidence of infection.  Chest x-ray is clear.  EKG without dysrhythmia.  She is low risk based on [place] syncope rule and the fact that she has no complaints and feels well.  Offered admission but she wants to go home.    [RO]      ED Course User Index  [RO] Phuc Alvarez MD                                           [facility]    Final diagnoses:   Syncope, unspecified syncope type       ED Disposition  ED Disposition     ED Disposition  Condition Comment    Discharge Good           Ana Luisa Diaz MD  [address]  [place] [address] [phone]    Call   To schedule follow-up appointment in 2 to 3 days         Medication List      No changes were made to your prescriptions during this visit.          Phuc Alvarez MD  09/19/21 1813       Phuc Alvarez MD  09/19/21 0818

## 2021-09-20 ENCOUNTER — OFFICE VISIT (OUTPATIENT)
Dept: INTERNAL MEDICINE | Facility: CLINIC | Age: 76
End: 2021-09-20

## 2021-09-20 ENCOUNTER — PATIENT OUTREACH (OUTPATIENT)
Dept: CASE MANAGEMENT | Facility: OTHER | Age: 76
End: 2021-09-20

## 2021-09-20 VITALS
TEMPERATURE: 98 F | OXYGEN SATURATION: 98 % | WEIGHT: 124.2 LBS | BODY MASS INDEX: 24.39 KG/M2 | SYSTOLIC BLOOD PRESSURE: 130 MMHG | HEIGHT: 60 IN | RESPIRATION RATE: 16 BRPM | DIASTOLIC BLOOD PRESSURE: 70 MMHG | HEART RATE: 97 BPM

## 2021-09-20 DIAGNOSIS — R47.81 SLURRED SPEECH: ICD-10-CM

## 2021-09-20 DIAGNOSIS — I10 ESSENTIAL HYPERTENSION: ICD-10-CM

## 2021-09-20 DIAGNOSIS — R55 SYNCOPE, UNSPECIFIED SYNCOPE TYPE: ICD-10-CM

## 2021-09-20 DIAGNOSIS — E78.5 HYPERLIPIDEMIA, UNSPECIFIED HYPERLIPIDEMIA TYPE: ICD-10-CM

## 2021-09-20 DIAGNOSIS — R40.4 TRANSIENT ALTERATION OF AWARENESS: ICD-10-CM

## 2021-09-20 LAB — QT INTERVAL: 375 MS

## 2021-09-20 PROCEDURE — 99215 OFFICE O/P EST HI 40 MIN: CPT | Performed by: INTERNAL MEDICINE

## 2021-09-20 NOTE — OUTREACH NOTE
Ambulatory Case Management Note    Patient Outreach  Talked with patient's spouse. Discussed 9/19/21 ED visit regarding syncope.  Patient treated and discharged home. Spouse states patient compliant with ED recommendations and has 9/20/21 PCP appointment for ED follow up. He states patient has no difficulty with headaches; chest pain; SOB; appetite or sleeping; steady gait and has assistance from family as needed. Reviewed with spouse ED AVS recommendations; 24/7 Nurse Line telephone number and case management services. Spouse verbalized understanding and states to appreciate phone call.  No further questions voiced at this time. Conversation brief.     Ramila Blanton RN  Ambulatory Case Management    9/20/2021, 10:42 EDT

## 2021-09-23 ENCOUNTER — HOSPITAL ENCOUNTER (OUTPATIENT)
Dept: MRI IMAGING | Facility: HOSPITAL | Age: 76
Discharge: HOME | End: 2021-09-23

## 2021-09-23 ENCOUNTER — HOSPITAL ENCOUNTER (OUTPATIENT)
Dept: ULTRASOUND IMAGING | Facility: HOSPITAL | Age: 76
Discharge: HOME | End: 2021-09-23

## 2021-09-23 ENCOUNTER — TELEPHONE (OUTPATIENT)
Dept: INTERNAL MEDICINE | Facility: CLINIC | Age: 76
End: 2021-09-23

## 2021-09-23 ENCOUNTER — TRANSCRIBE ORDERS (OUTPATIENT)
Dept: INTERNAL MEDICINE | Facility: CLINIC | Age: 76
End: 2021-09-23

## 2021-09-23 ENCOUNTER — HOSPITAL ENCOUNTER (OUTPATIENT)
Dept: CARDIOLOGY | Facility: HOSPITAL | Age: 76
Discharge: HOME | End: 2021-09-23

## 2021-09-23 VITALS
WEIGHT: 120 LBS | BODY MASS INDEX: 23.56 KG/M2 | DIASTOLIC BLOOD PRESSURE: 87 MMHG | SYSTOLIC BLOOD PRESSURE: 198 MMHG | HEIGHT: 60 IN

## 2021-09-23 DIAGNOSIS — R55 SYNCOPE, UNSPECIFIED SYNCOPE TYPE: Primary | ICD-10-CM

## 2021-09-23 DIAGNOSIS — E78.5 HYPERLIPIDEMIA, UNSPECIFIED HYPERLIPIDEMIA TYPE: ICD-10-CM

## 2021-09-23 DIAGNOSIS — R55 SYNCOPE, UNSPECIFIED SYNCOPE TYPE: ICD-10-CM

## 2021-09-23 DIAGNOSIS — I10 ESSENTIAL HYPERTENSION: ICD-10-CM

## 2021-09-23 PROCEDURE — 93306 TTE W/DOPPLER COMPLETE: CPT

## 2021-09-23 PROCEDURE — 93306 TTE W/DOPPLER COMPLETE: CPT | Performed by: INTERNAL MEDICINE

## 2021-09-23 PROCEDURE — 93880 EXTRACRANIAL BILAT STUDY: CPT

## 2021-09-23 PROCEDURE — 70551 MRI BRAIN STEM W/O DYE: CPT

## 2021-09-24 LAB
AORTIC DIMENSIONLESS INDEX: 0.8 (DI)
BH CV ECHO MEAS - ACS: 1.6 CM
BH CV ECHO MEAS - AO MAX PG (FULL): 1.3 MMHG
BH CV ECHO MEAS - AO MAX PG: 5 MMHG
BH CV ECHO MEAS - AO MEAN PG (FULL): 0 MMHG
BH CV ECHO MEAS - AO MEAN PG: 2 MMHG
BH CV ECHO MEAS - AO ROOT AREA (BSA CORRECTED): 1.7
BH CV ECHO MEAS - AO ROOT AREA: 5.3 CM^2
BH CV ECHO MEAS - AO ROOT DIAM: 2.6 CM
BH CV ECHO MEAS - AO V2 MAX: 112 CM/SEC
BH CV ECHO MEAS - AO V2 MEAN: 65.2 CM/SEC
BH CV ECHO MEAS - AO V2 VTI: 19.5 CM
BH CV ECHO MEAS - ASC AORTA: 2.8 CM
BH CV ECHO MEAS - AVA(I,A): 2.8 CM^2
BH CV ECHO MEAS - AVA(I,D): 2.8 CM^2
BH CV ECHO MEAS - AVA(V,A): 3 CM^2
BH CV ECHO MEAS - AVA(V,D): 3 CM^2
BH CV ECHO MEAS - BSA(HAYCOCK): 1.5 M^2
BH CV ECHO MEAS - BSA: 1.5 M^2
BH CV ECHO MEAS - BZI_BMI: 23.4 KILOGRAMS/M^2
BH CV ECHO MEAS - BZI_METRIC_HEIGHT: 152.4 CM
BH CV ECHO MEAS - BZI_METRIC_WEIGHT: 54.4 KG
BH CV ECHO MEAS - EDV(CUBED): 52.7 ML
BH CV ECHO MEAS - EDV(MOD-SP2): 70.8 ML
BH CV ECHO MEAS - EDV(MOD-SP4): 67.2 ML
BH CV ECHO MEAS - EDV(TEICH): 60 ML
BH CV ECHO MEAS - EF(CUBED): 69.3 %
BH CV ECHO MEAS - EF(MOD-BP): 57.5 %
BH CV ECHO MEAS - EF(MOD-SP2): 61.6 %
BH CV ECHO MEAS - EF(MOD-SP4): 56.3 %
BH CV ECHO MEAS - EF(TEICH): 61.7 %
BH CV ECHO MEAS - ESV(CUBED): 16.2 ML
BH CV ECHO MEAS - ESV(MOD-SP2): 27.2 ML
BH CV ECHO MEAS - ESV(MOD-SP4): 29.4 ML
BH CV ECHO MEAS - ESV(TEICH): 23 ML
BH CV ECHO MEAS - FS: 32.5 %
BH CV ECHO MEAS - IVS/LVPW: 0.92
BH CV ECHO MEAS - IVSD: 0.88 CM
BH CV ECHO MEAS - LAT PEAK E' VEL: 6.5 CM/SEC
BH CV ECHO MEAS - LV DIASTOLIC VOL/BSA (35-75): 44.7 ML/M^2
BH CV ECHO MEAS - LV MASS(C)D: 101.5 GRAMS
BH CV ECHO MEAS - LV MASS(C)DI: 67.6 GRAMS/M^2
BH CV ECHO MEAS - LV MAX PG: 3.7 MMHG
BH CV ECHO MEAS - LV MEAN PG: 2 MMHG
BH CV ECHO MEAS - LV SYSTOLIC VOL/BSA (12-30): 19.6 ML/M^2
BH CV ECHO MEAS - LV V1 MAX: 96.7 CM/SEC
BH CV ECHO MEAS - LV V1 MEAN: 58.8 CM/SEC
BH CV ECHO MEAS - LV V1 VTI: 16 CM
BH CV ECHO MEAS - LVIDD: 3.8 CM
BH CV ECHO MEAS - LVIDS: 2.5 CM
BH CV ECHO MEAS - LVLD AP2: 6.7 CM
BH CV ECHO MEAS - LVLD AP4: 7.1 CM
BH CV ECHO MEAS - LVLS AP2: 5.5 CM
BH CV ECHO MEAS - LVLS AP4: 6.2 CM
BH CV ECHO MEAS - LVOT AREA (M): 3.5 CM^2
BH CV ECHO MEAS - LVOT AREA: 3.5 CM^2
BH CV ECHO MEAS - LVOT DIAM: 2.1 CM
BH CV ECHO MEAS - LVPWD: 0.96 CM
BH CV ECHO MEAS - MED PEAK E' VEL: 5.8 CM/SEC
BH CV ECHO MEAS - MV A MAX VEL: 124 CM/SEC
BH CV ECHO MEAS - MV DEC SLOPE: 415 CM/SEC^2
BH CV ECHO MEAS - MV DEC TIME: 150 SEC
BH CV ECHO MEAS - MV E MAX VEL: 82.6 CM/SEC
BH CV ECHO MEAS - MV E/A: 0.67
BH CV ECHO MEAS - MV MEAN PG: 4 MMHG
BH CV ECHO MEAS - MV P1/2T MAX VEL: 93.9 CM/SEC
BH CV ECHO MEAS - MV P1/2T: 66.3 MSEC
BH CV ECHO MEAS - MV V2 MEAN: 90.6 CM/SEC
BH CV ECHO MEAS - MV V2 VTI: 25.8 CM
BH CV ECHO MEAS - MVA P1/2T LCG: 2.3 CM^2
BH CV ECHO MEAS - MVA(P1/2T): 3.3 CM^2
BH CV ECHO MEAS - MVA(VTI): 2.1 CM^2
BH CV ECHO MEAS - PA ACC TIME: 0.15 SEC
BH CV ECHO MEAS - PA MAX PG: 3.7 MMHG
BH CV ECHO MEAS - PA PR(ACCEL): 11.1 MMHG
BH CV ECHO MEAS - PA V2 MAX: 96.4 CM/SEC
BH CV ECHO MEAS - PULM A REVS DUR: 0.11 SEC
BH CV ECHO MEAS - PULM A REVS VEL: 29.7 CM/SEC
BH CV ECHO MEAS - PULM DIAS VEL: 33.7 CM/SEC
BH CV ECHO MEAS - PULM S/D: 1.2
BH CV ECHO MEAS - PULM SYS VEL: 41.7 CM/SEC
BH CV ECHO MEAS - RAP SYSTOLE: 3 MMHG
BH CV ECHO MEAS - RV MEAN PG: 1 MMHG
BH CV ECHO MEAS - RV V1 MEAN: 49.5 CM/SEC
BH CV ECHO MEAS - RV V1 VTI: 11.8 CM
BH CV ECHO MEAS - RVSP: 22.3 MMHG
BH CV ECHO MEAS - SI(AO): 68.9 ML/M^2
BH CV ECHO MEAS - SI(CUBED): 24.3 ML/M^2
BH CV ECHO MEAS - SI(LVOT): 36.9 ML/M^2
BH CV ECHO MEAS - SI(MOD-SP2): 29 ML/M^2
BH CV ECHO MEAS - SI(MOD-SP4): 25.2 ML/M^2
BH CV ECHO MEAS - SI(TEICH): 24.6 ML/M^2
BH CV ECHO MEAS - SV(AO): 103.5 ML
BH CV ECHO MEAS - SV(CUBED): 36.5 ML
BH CV ECHO MEAS - SV(LVOT): 55.4 ML
BH CV ECHO MEAS - SV(MOD-SP2): 43.6 ML
BH CV ECHO MEAS - SV(MOD-SP4): 37.8 ML
BH CV ECHO MEAS - SV(TEICH): 37 ML
BH CV ECHO MEAS - TAPSE (>1.6): 1.6 CM
BH CV ECHO MEAS - TR MAX VEL: 218 CM/SEC
BH CV ECHO MEASUREMENTS AVERAGE E/E' RATIO: 13.43
BH CV VAS BP RIGHT ARM: NORMAL MMHG
BH CV XLRA - RV BASE: 2.6 CM
BH CV XLRA - RV LENGTH: 6.5 CM
BH CV XLRA - RV MID: 1.3 CM
BH CV XLRA - TDI S': 11.3 CM/SEC
LEFT ATRIUM VOLUME INDEX: 23 ML/M2
LV EF 2D ECHO EST: 60 %

## 2021-09-27 ENCOUNTER — HOSPITAL ENCOUNTER (OUTPATIENT)
Dept: CARDIOLOGY | Facility: HOSPITAL | Age: 76
Setting detail: HOSPITAL OUTPATIENT SURGERY
Discharge: HOME | End: 2021-09-27
Admitting: INTERNAL MEDICINE

## 2021-09-27 ENCOUNTER — OFFICE VISIT (OUTPATIENT)
Dept: INTERNAL MEDICINE | Facility: CLINIC | Age: 76
End: 2021-09-27

## 2021-09-27 VITALS
SYSTOLIC BLOOD PRESSURE: 178 MMHG | RESPIRATION RATE: 17 BRPM | WEIGHT: 124 LBS | HEART RATE: 104 BPM | OXYGEN SATURATION: 98 % | BODY MASS INDEX: 24.35 KG/M2 | DIASTOLIC BLOOD PRESSURE: 98 MMHG | HEIGHT: 60 IN

## 2021-09-27 DIAGNOSIS — R55 SYNCOPE, UNSPECIFIED SYNCOPE TYPE: ICD-10-CM

## 2021-09-27 DIAGNOSIS — I16.0 HYPERTENSIVE URGENCY: Primary | ICD-10-CM

## 2021-09-27 DIAGNOSIS — I10 ESSENTIAL HYPERTENSION: ICD-10-CM

## 2021-09-27 DIAGNOSIS — E78.5 HYPERLIPIDEMIA, UNSPECIFIED HYPERLIPIDEMIA TYPE: ICD-10-CM

## 2021-09-27 PROCEDURE — 93225 XTRNL ECG REC<48 HRS REC: CPT

## 2021-09-27 PROCEDURE — 99215 OFFICE O/P EST HI 40 MIN: CPT | Performed by: INTERNAL MEDICINE

## 2021-09-27 PROCEDURE — 93226 XTRNL ECG REC<48 HR SCAN A/R: CPT

## 2021-09-27 RX ORDER — ROSUVASTATIN CALCIUM 5 MG/1
5 TABLET, COATED ORAL DAILY
Qty: 90 TABLET | Refills: 3 | Status: SHIPPED | OUTPATIENT
Start: 2021-09-27 | End: 2022-08-25

## 2021-09-27 RX ORDER — METOPROLOL SUCCINATE 25 MG/1
25 TABLET, EXTENDED RELEASE ORAL DAILY
Qty: 90 TABLET | Refills: 0 | Status: SHIPPED | OUTPATIENT
Start: 2021-09-27 | End: 2021-09-28 | Stop reason: SDUPTHER

## 2021-09-27 NOTE — PROGRESS NOTES
Adali Hankins is a 75 y.o. female, who presents with a chief complaint of   Chief Complaint   Patient presents with   • Hospital Follow Up Visit           HPI   Pt here with her daughter.  Since her last OV she had one OV where she got out of bed and got dizzy.  She has not been checking bp at home.       HLD - on crestor every other day.  On co q 10.  Cholesterol 340 -> 244 with crestor 5mg every other day    HTN - bp was very high on Thursday 198/87 when pt had testing done.  She had a MRI brain, carotid us, and echo done that day.  MRI showed changes from hypertension.  Today bp still high today.  Pt feels like she was upset and stressed out at that time. Pt gets very nervous with testing and at the doctor's office. Holter monitor on pt currently.      The following portions of the patient's history were reviewed and updated as appropriate: allergies, current medications, past family history, past medical history, past social history, past surgical history and problem list.    Allergies: Patient has no known allergies.    Review of Systems   Constitutional: Negative.    HENT: Negative.    Eyes: Negative.    Respiratory: Negative.    Cardiovascular: Negative.    Gastrointestinal: Negative.    Endocrine: Negative.    Genitourinary: Negative.    Musculoskeletal: Negative.    Skin: Negative.    Allergic/Immunologic: Negative.    Neurological: Negative.    Hematological: Negative.    Psychiatric/Behavioral: Positive for decreased concentration.   All other systems reviewed and are negative.            Wt Readings from Last 3 Encounters:   09/27/21 56.2 kg (124 lb)   09/23/21 54.4 kg (120 lb)   09/23/21 54.4 kg (120 lb)     Temp Readings from Last 3 Encounters:   09/20/21 98 °F (36.7 °C)   09/19/21 97.8 °F (36.6 °C) (Oral)   03/02/21 96.9 °F (36.1 °C) (Temporal)     BP Readings from Last 3 Encounters:   09/27/21 178/98   09/23/21 (!) 198/87 09/20/21 130/70     Pulse Readings from Last 3 Encounters:   09/27/21  104   09/20/21 97   09/19/21 91     Body mass index is 24.22 kg/m².  SpO2 Readings from Last 3 Encounters:   09/27/21 98%   09/20/21 98%   09/19/21 98%          Physical Exam  Vitals and nursing note reviewed.   Constitutional:       General: She is not in acute distress.     Appearance: She is well-developed.   HENT:      Head: Normocephalic and atraumatic.      Right Ear: External ear normal.      Left Ear: External ear normal.      Nose: Nose normal.   Eyes:      Conjunctiva/sclera: Conjunctivae normal.      Pupils: Pupils are equal, round, and reactive to light.   Cardiovascular:      Rate and Rhythm: Normal rate and regular rhythm.      Heart sounds: Normal heart sounds.   Pulmonary:      Effort: Pulmonary effort is normal. No respiratory distress.      Breath sounds: Normal breath sounds. No wheezing.   Musculoskeletal:         General: Normal range of motion.      Cervical back: Normal range of motion and neck supple.      Comments: Normal gait   Skin:     General: Skin is warm and dry.   Neurological:      Mental Status: She is alert and oriented to person, place, and time.   Psychiatric:         Behavior: Behavior normal.         Thought Content: Thought content normal.         Judgment: Judgment normal.         Results for orders placed or performed during the hospital encounter of 09/27/21   Holter monitor - 24 hour   Result Value Ref Range    Target HR (85%) 122 bpm    Max. Pred. HR (100%) 144 bpm     Result Review :             MRI Brain Without Contrast (09/23/2021 11:33)  US Carotid Bilateral (09/23/2021 12:04)  Adult Transthoracic Echo Complete W/ Cont if Necessary Per Protocol (09/23/2021 17:34)           Assessment and Plan    Diagnoses and all orders for this visit:    1. Hypertensive urgency (Primary) - hypertensive urgency level bp's at times.  Needs further evaluation.  Cont current bp's and add metoprolol.  bp up today.  HR has also been up.  Take additional lisinopril today. Pt to start  metoprolol after holter completed tomorrow.  Will await holter to see if any arrhthymias contributing to sx.  Echo ok.  Duplex renal artery scan ordered to look for renal artery stenosis.  Encouraged pt to monitor bp 2x a day.  Will recheck in office on Friday.  -     Duplex Renal Artery - Bilateral Complete CAR; Future    2. Hyperlipidemia, unspecified hyperlipidemia type - tolerating statin every other day so will increase statin to 5mg daiy  -     rosuvastatin (CRESTOR) 5 MG tablet; Take 1 tablet by mouth Daily.  Dispense: 90 tablet; Refill: 3    3. Essential hypertension  -     Discontinue: metoprolol succinate XL (Toprol XL) 25 MG 24 hr tablet; Take 1 tablet by mouth Daily.  Dispense: 90 tablet; Refill: 0  -     Duplex Renal Artery - Bilateral Complete CAR; Future    Reviewed MRI, carotid u/s and echo with pt and her daughter today.  All questions answered.     I spent 40 minutes caring for Adali on this date of service. This time includes time spent by me in the following activities:preparing for the visit, reviewing tests, obtaining and/or reviewing a separately obtained history, performing a medically appropriate examination and/or evaluation , counseling and educating the patient/family/caregiver, ordering medications, tests, or procedures, documenting information in the medical record and care coordination      Outpatient Medications Prior to Visit   Medication Sig Dispense Refill   • aspirin 81 MG tablet Take 81 mg by mouth daily.     • CALCIUM CITRATE-VITAMIN D PO      • Cranberry-Vitamin C (AZO CRANBERRY URINARY TRACT PO) Take  by mouth 2 (Two) Times a Day.     • fluticasone (FLONASE) 50 MCG/ACT nasal spray USE 2 SPRAYS IN EACH NOSTRIL ONE TIME DAILY AS DIRECTED 48 g 2   • Ibuprofen 200 MG capsule      • lisinopril (PRINIVIL,ZESTRIL) 20 MG tablet TAKE 1 TABLET EVERY DAY 90 tablet 1   • Magnesium 500 MG capsule Take  by mouth.     • vitamin B-12 (CYANOCOBALAMIN) 1000 MCG tablet Take 1,000 mcg by mouth  Daily.     • rosuvastatin (CRESTOR) 5 MG tablet TAKE 1 TABLET EVERY OTHER DAY 45 tablet 3     No facility-administered medications prior to visit.     New Medications Ordered This Visit   Medications   • rosuvastatin (CRESTOR) 5 MG tablet     Sig: Take 1 tablet by mouth Daily.     Dispense:  90 tablet     Refill:  3     [unfilled]  Medications Discontinued During This Encounter   Medication Reason   • rosuvastatin (CRESTOR) 5 MG tablet Reorder         Return in about 4 days (around 10/1/2021) for Recheck.    Patient was given instructions and counseling regarding her condition or for health maintenance advice. Please see specific information pulled into the AVS if appropriate.

## 2021-09-28 DIAGNOSIS — I10 ESSENTIAL HYPERTENSION: ICD-10-CM

## 2021-09-28 RX ORDER — METOPROLOL SUCCINATE 25 MG/1
25 TABLET, EXTENDED RELEASE ORAL DAILY
Qty: 30 TABLET | Refills: 0 | Status: SHIPPED | OUTPATIENT
Start: 2021-09-28 | End: 2022-01-26 | Stop reason: SDUPTHER

## 2021-09-29 ENCOUNTER — PATIENT OUTREACH (OUTPATIENT)
Dept: CASE MANAGEMENT | Facility: OTHER | Age: 76
End: 2021-09-29

## 2021-09-29 LAB
MAXIMAL PREDICTED HEART RATE: 144 BPM
STRESS TARGET HR: 122 BPM

## 2021-09-29 PROCEDURE — 93227 XTRNL ECG REC<48 HR R&I: CPT | Performed by: INTERNAL MEDICINE

## 2021-09-29 NOTE — OUTREACH NOTE
Ambulatory Case Management Note    Patient Outreach    Talked with patient. Discussed 9/19/21 ED visit regarding syncope . Patient states to be compliant with ED recommendations; has seen PCP; had holter monitor; echo; received recommendations  and states symptoms have improved. She states to be monitoring blood pressure; HR and compliant with medications. She states blood pressure are WNL's with highest 155/77 and lowest 120/69. HR have ranged 85 to 91.   Patient confirms PCP follow up 10/1/21. Patient states no difficulty with chest pain; SOB; appetite and sleeping.    Reviewed ED AVS recommendations; education; 24/7 Nurse Line Telephone number; ACM contact information  and Case Management services.  Patient verbalized understanding and states to appreciate phone call.  No further questions or concerns voiced at this time.   General & Health Literacy Assessment    Questions/Answers      Most Recent Value   Assessment Completed With  Patient   Living Arrangement  Spouse [Patient lives with spouse,  independent with ADL's,  meal preparation,  receiving assistance with transportation and ambulates without assistive device ]   Type of Residence  Private Residence   Home Care Services  No   Equiptment Used at Home  -- [Blood pressure  cuff]   Bed or Wheelchair Confined  No   Difficulty Keeping Appointments  No        Care Evaluation    Questions/Answers      Most Recent Value   Annual Wellness Visit:   Patient Will Schedule   Care Gaps Addressed  Flu Shot, Mammogram, Pneumonia Vaccine   Flu Shot Status  Up to Date   Mammogram Status  Up to Date   Mammogram Completion at [facility] or Other  [facility]   Pneumonia Vaccine Status  Up to Date   Other Patient Education/Resources   -- [Reviewed ED AVS recommendations,  education,  COVID 19 precautions,  24/7 Nurse Line Telephone number,  ACM contact information,  Advance Directives,  My Chart,  gaps in care,  AWV and Case Management services.]   24/7 Nurse Call Line Education  Method  Verbal   ACP Education Method  Verbal   MyChart Education Method  Verbal   Advanced Directives:  Patient Has   Medication Adherence  -- [Reviewed with patent medications and states to be compliant with medications]   Healthy Lifestyle (Self-Efficacy)  recognizes when to contact medical assistance, self-reports important symptoms to medical professional, recognizes when to stop activity      SDOH updated and reviewed with the patient during this program:     Financial Resource Strain: Low Risk    • Difficulty of Paying Living Expenses: Not hard at all       Food Insecurity: No Food Insecurity   • Worried About Running Out of Food in the Last Year: Never true   • Ran Out of Food in the Last Year: Never true       Transportation Needs: No Transportation Needs   • Lack of Transportation (Medical): No   • Lack of Transportation (Non-Medical): No         Care Plan: Hypertension   Updates made since 9/29/2021 12:00 AM      Problem: LIFESTYLE CHOICES       Goal: Healthy food choice       Task: Provide resources for diet management such as calorie counting and sodium reduction    Responsible User: Ramila Blanton RN      Task: Provide community resources for healthy food options    Responsible User: Ramila Blanton RN      Task: Discuss healthy food options and preferred restaurants    Responsible User: Ramila Blanton RN      Problem: SODIUM INTAKE       Goal: Patient will maintain management of sodium consumption       Task: Educate patient on daily sodium intake & how sodium affects the heart    Responsible User: Ramila Blanton RN      Task: Instruct patient to eat less salt and watch fluids. No added salt or no more than 2 grams (2000mgs) of sodium or 2 liters of fluid in a 24-hour period, or follow provider's specific recommendations.    Responsible User: Ramila Blanton RN      Task: Educate patient how to read a nutrition label pointing out servings and serving size    Responsible User:  Ramila Blanton RN      Task: Educate patient on sodium alternatives    Responsible User: Ramila Blanton RN      Problem: MEDICATION ADHERENCE       Goal: Consistently take medications as prescribed       Task: Discuss barriers to medication adherence with patient    Responsible User: Ramila Blanton RN      Task: Educate patient on frequency and refill details of meds    Responsible User: Ramila Blanton RN      Task: Assist patients in setting up daily notes/alarms for meds. Consider pill box use    Responsible User: Ramila Blanton RN      Problem: PATIENT IS INACTIVE       Goal: Exercise at least 20 minutes per day       Task: Discuss barriers to activity with patient. Update SDOH.    Responsible User: Ramila Blanton RN      Task: Develop exercise plan with patient    Responsible User: Ramila Blanton RN      Problem: PATIENT IS HYPERTENSIVE       Goal: Remain at or Below Target Blood Pressure       Task: Establish a target blood pressure with the patient in conjunction with PCP    Responsible User: Ramila Blanton RN      Task: Discuss steps to manage BP with patient    Responsible User: Ramila Blanton RN      Task: Educate on disease process and complications associated with noncompliance    Responsible User: Ramila Blanton RN      Task: Educate on keeping a log    Responsible User: Ramila Blanton RN Teresita Tackett, RN  Ambulatory Case Management    9/29/2021, 14:53 EDT

## 2021-10-01 ENCOUNTER — OFFICE VISIT (OUTPATIENT)
Dept: INTERNAL MEDICINE | Facility: CLINIC | Age: 76
End: 2021-10-01

## 2021-10-01 DIAGNOSIS — I16.0 HYPERTENSIVE URGENCY: ICD-10-CM

## 2021-10-01 DIAGNOSIS — R40.4 TRANSIENT ALTERATION OF AWARENESS: ICD-10-CM

## 2021-10-01 DIAGNOSIS — I10 PRIMARY HYPERTENSION: Primary | ICD-10-CM

## 2021-10-01 DIAGNOSIS — R55 SYNCOPE, UNSPECIFIED SYNCOPE TYPE: ICD-10-CM

## 2021-10-01 PROCEDURE — 99213 OFFICE O/P EST LOW 20 MIN: CPT | Performed by: INTERNAL MEDICINE

## 2021-10-01 NOTE — PROGRESS NOTES
Adali Hankins is a 75 y.o. female, who presents with a chief complaint of   Chief Complaint   Patient presents with   • Hypertension           HPI   This visit has been scheduled as a televisit to comply with patient safety concerns in accordance with CDC recommendations. The patient had issues with technology so the visit was changed from televisit to phone visit.      You have chosen to receive care through a telephone visit. Do you consent to use a telephone visit for your medical care today? Yes    Hx from patient and her daughter.    HTN - Pt doing better.  She completed her holter monitor.  sbp's now 120-130/70-80's with one bp 150/79.  Pulse was in 90's and is now coming down to the 80's.        The following portions of the patient's history were reviewed and updated as appropriate: allergies, current medications, past family history, past medical history, past social history, past surgical history and problem list.    Allergies: Patient has no known allergies.    Review of Systems   Constitutional: Negative.    HENT: Negative.    Eyes: Negative.    Respiratory: Negative.    Cardiovascular: Negative.    Gastrointestinal: Negative.    Endocrine: Negative.    Genitourinary: Negative.    Musculoskeletal: Negative.    Skin: Negative.    Allergic/Immunologic: Negative.    Neurological: Negative.    Hematological: Negative.    Psychiatric/Behavioral: Negative.    All other systems reviewed and are negative.            Wt Readings from Last 3 Encounters:   09/27/21 56.2 kg (124 lb)   09/23/21 54.4 kg (120 lb)   09/23/21 54.4 kg (120 lb)     Temp Readings from Last 3 Encounters:   09/20/21 98 °F (36.7 °C)   09/19/21 97.8 °F (36.6 °C) (Oral)   03/02/21 96.9 °F (36.1 °C) (Temporal)     BP Readings from Last 3 Encounters:   09/27/21 178/98   09/23/21 (!) 198/87   09/20/21 130/70     Pulse Readings from Last 3 Encounters:   09/27/21 104   09/20/21 97   09/19/21 91     There is no height or weight on file to  calculate BMI.  SpO2 Readings from Last 3 Encounters:   09/27/21 98%   09/20/21 98%   09/19/21 98%          Physical Exam  Constitutional:       General: She is not in acute distress.  Pulmonary:      Effort: No respiratory distress.   Neurological:      Mental Status: She is alert and oriented to person, place, and time.   Psychiatric:         Behavior: Behavior normal.         Thought Content: Thought content normal.         Judgment: Judgment normal.         Results for orders placed or performed during the hospital encounter of 09/27/21   Holter monitor - 24 hour   Result Value Ref Range    Target HR (85%) 122 bpm    Max. Pred. HR (100%) 144 bpm     Result Review :                  Assessment and Plan    Diagnoses and all orders for this visit:    1. Primary hypertension (Primary)    2. Syncope/unresponsive episode    3. Transient alteration of awareness    4. Hypertensive urgency     reviewed echo and holter with pt and her daughter.  bp doing better with addition of metoprolol.   Cont to check bp daily and check log.  Duplex renal artery ordered to evaluate labile bp's.         I spent 20 minutes caring for Adali on this date of service. This time includes time spent by me in the following activities:preparing for the visit, reviewing tests, obtaining and/or reviewing a separately obtained history, performing a medically appropriate examination and/or evaluation , counseling and educating the patient/family/caregiver, documenting information in the medical record and care coordination      Outpatient Medications Prior to Visit   Medication Sig Dispense Refill   • aspirin 81 MG tablet Take 81 mg by mouth daily.     • CALCIUM CITRATE-VITAMIN D PO      • Cranberry-Vitamin C (AZO CRANBERRY URINARY TRACT PO) Take  by mouth 2 (Two) Times a Day.     • fluticasone (FLONASE) 50 MCG/ACT nasal spray USE 2 SPRAYS IN EACH NOSTRIL ONE TIME DAILY AS DIRECTED 48 g 2   • Ibuprofen 200 MG capsule      • lisinopril  (PRINIVIL,ZESTRIL) 20 MG tablet TAKE 1 TABLET EVERY DAY 90 tablet 1   • Magnesium 500 MG capsule Take  by mouth.     • metoprolol succinate XL (Toprol XL) 25 MG 24 hr tablet Take 1 tablet by mouth Daily. 30 tablet 0   • rosuvastatin (CRESTOR) 5 MG tablet Take 1 tablet by mouth Daily. 90 tablet 3   • vitamin B-12 (CYANOCOBALAMIN) 1000 MCG tablet Take 1,000 mcg by mouth Daily.       No facility-administered medications prior to visit.     No orders of the defined types were placed in this encounter.    [unfilled]  There are no discontinued medications.      Return in about 3 months (around 1/1/2022) for Recheck, labs.    Patient was given instructions and counseling regarding her condition or for health maintenance advice. Please see specific information pulled into the AVS if appropriate.

## 2021-10-08 ENCOUNTER — PATIENT OUTREACH (OUTPATIENT)
Dept: CASE MANAGEMENT | Facility: OTHER | Age: 76
End: 2021-10-08

## 2021-10-08 NOTE — OUTREACH NOTE
Ambulatory Case Management Note  Talked with patient. Patient states to be compliant with medications; medical appointments; monitoring and logging blood pressure. Patient states blood pressure and HR WNL's. Patient had 10/1/21 PCP office and received recommendations. Care plan reviewed and addressed as below. HRCM continues. Additional outreach scheduled.     Care Plan: Hypertension   Updates made since 10/8/2021 12:00 AM      Problem: SODIUM INTAKE       Goal: Patient will maintain management of sodium consumption       Task: Educate patient on daily sodium intake & how sodium affects the heart Completed 10/8/2021   Responsible User: Ramila Blanton RN      Task: Instruct patient to eat less salt and watch fluids. No added salt or no more than 2 grams (2000mgs) of sodium or 2 liters of fluid in a 24-hour period, or follow provider's specific recommendations. Completed 10/8/2021   Responsible User: Ramila Blanton RN      Task: Educate patient how to read a nutrition label pointing out servings and serving size Completed 10/8/2021   Responsible User: Ramila Blanton RN      Task: Educate patient on sodium alternatives Completed 10/8/2021   Responsible User: Ramila Blanton RN      Problem: MEDICATION ADHERENCE       Goal: Consistently take medications as prescribed       Task: Discuss barriers to medication adherence with patient Completed 10/8/2021   Responsible User: Ramila Blanton RN      Task: Educate patient on frequency and refill details of meds Completed 10/8/2021   Responsible User: Ramila Blanton RN      Task: Assist patients in setting up daily notes/alarms for meds. Consider pill box use Completed 10/8/2021   Responsible User: Ramila Blanton RN      Problem: PATIENT IS INACTIVE       Goal: Exercise at least 20 minutes per day       Task: Discuss barriers to activity with patient. Update SDOH. Completed 10/8/2021   Responsible User: Ramila Blanton RN      Problem: PATIENT IS  HYPERTENSIVE       Goal: Remain at or Below Target Blood Pressure       Task: Establish a target blood pressure with the patient in conjunction with PCP Completed 10/8/2021   Responsible User: Ramila Blanton RN      Task: Discuss steps to manage BP with patient Completed 10/8/2021   Responsible User: Ramila Blanton RN      Task: Educate on disease process and complications associated with noncompliance Completed 10/8/2021   Responsible User: Ramila Blanton RN      Task: Educate on keeping a log Completed 10/8/2021   Responsible User: Ramila Blanton RN Teresita Tackett, RN  Ambulatory Case Management    10/8/2021, 16:38 EDT

## 2021-10-10 ENCOUNTER — PATIENT MESSAGE (OUTPATIENT)
Dept: INTERNAL MEDICINE | Facility: CLINIC | Age: 76
End: 2021-10-10

## 2021-10-12 ENCOUNTER — TRANSCRIBE ORDERS (OUTPATIENT)
Dept: ADMINISTRATIVE | Facility: HOSPITAL | Age: 76
End: 2021-10-12

## 2021-10-12 DIAGNOSIS — I16.0 HYPERTENSIVE URGENCY: Primary | ICD-10-CM

## 2021-10-13 ENCOUNTER — TRANSCRIBE ORDERS (OUTPATIENT)
Dept: ADMINISTRATIVE | Facility: HOSPITAL | Age: 76
End: 2021-10-13

## 2021-10-18 ENCOUNTER — OFFICE VISIT (OUTPATIENT)
Dept: NEUROLOGY | Facility: CLINIC | Age: 76
End: 2021-10-18

## 2021-10-18 ENCOUNTER — TRANSCRIBE ORDERS (OUTPATIENT)
Dept: ADMINISTRATIVE | Facility: HOSPITAL | Age: 76
End: 2021-10-18

## 2021-10-18 VITALS
OXYGEN SATURATION: 100 % | BODY MASS INDEX: 24.35 KG/M2 | HEART RATE: 83 BPM | HEIGHT: 60 IN | DIASTOLIC BLOOD PRESSURE: 88 MMHG | SYSTOLIC BLOOD PRESSURE: 150 MMHG | WEIGHT: 124 LBS

## 2021-10-18 DIAGNOSIS — R56.9 OBSERVED SEIZURE-LIKE ACTIVITY (HCC): Primary | ICD-10-CM

## 2021-10-18 PROCEDURE — 99204 OFFICE O/P NEW MOD 45 MIN: CPT | Performed by: PSYCHIATRY & NEUROLOGY

## 2021-10-18 NOTE — PROGRESS NOTES
Chief Complaint  Neurologic Problem (stroke like symptoms, memory loss began in 2019- MRI completed on 9/23 review results- syncompe)    Subjective          Adali Hankins presents to [facility] for   HISTORY OF PRESENT ILLNESS:    Adali Hankins is a 76 year old right handed woman who presents with her daughter, Jonny Burch, to neurology clinic for initial evaluation and treatment of possible syncope or stroke like symptoms.  On 9/23/2021 her daughter gave her a sandwich and all of the sudden she had a blank look on her face and making growling noises and her eyes could not focus in on anything and she was not responding lasting for about 4 minutes.  She did not lose bowel or bladder.  No tongue biting.  Daughter noticed that her lips started turning blue and her grand daughter could not feel a pulse and when her daughter was about to give her PCR and she noticed that patient came back to slowly.  She does not recall anything about the episode.  This was the only spell she ever experienced.  She tells me she had not eaten breakfast when this happened.  Her BP was very high at 198/87 in the hospital.  She had a brain MRI scan done which I reviewed the images independently on her visit today and did not demonstrate any acute intracranial findings but did demonstrate atrophy and possible prior hypertensive microhemorrahges and white matter changes.  She had normal carotid doppler and her echocardiogram demonstrated 60% EF.  Her Holter monitoring was read as benign.  She is currently on aspirin and statin.  Her BP continues to be elevated today.  She has a blood pressure cuff at home.  She is not sure of any family history of seizures.  No history of seizures in patient.  She has not had any further spells.  She did not have a headache or migraine.      Past Medical History:   Diagnosis Date   • Arthritis    • Cataracts, bilateral    • Environmental allergies    • History of carcinoma in situ  "of cervix uteri    • Hyperlipidemia    • Hypertension    • Hypoglycemia    • Memory loss    • TIA (transient ischemic attack)    • Urethral cyst 6/23/2017   • Vaginal prolapse         Family History   Problem Relation Age of Onset   • Cancer Other         colon   • Hypertension Other    • Stroke Brother    • Breast cancer Neg Hx         Social History     Socioeconomic History   • Marital status:    Tobacco Use   • Smoking status: Former Smoker   Substance and Sexual Activity   • Alcohol use: No   • Drug use: No   • Sexual activity: Defer        I have personally reviewed the ROS as stated below.     Review of Systems   Constitutional: Negative for activity change, appetite change, fatigue and fever.   HENT: Positive for postnasal drip and sinus pressure.    Eyes: Negative for blurred vision, double vision and discharge.   Cardiovascular: Positive for palpitations.   Endocrine: Positive for cold intolerance.   Musculoskeletal: Positive for back pain and neck pain.   Allergic/Immunologic: Negative for environmental allergies and food allergies.   Neurological: Positive for numbness and confusion. Negative for dizziness, tremors, seizures, syncope, facial asymmetry, speech difficulty, weakness, light-headedness, headache and memory problem.   Hematological: Bruises/bleeds easily.   Psychiatric/Behavioral: Negative for agitation, behavioral problems, decreased concentration, dysphoric mood, hallucinations, self-injury, sleep disturbance, suicidal ideas, negative for hyperactivity, depressed mood and stress. The patient is not nervous/anxious.         Objective   Vital Signs:   /88 (BP Location: Left arm, Patient Position: Sitting)   Pulse 83   Ht 152.4 cm (60\")   Wt 56.2 kg (124 lb)   SpO2 100%   BMI 24.22 kg/m²       PHYSICAL EXAM:    General   Mental Status - Alert. General Appearance - Well developed, Well groomed, Oriented and Cooperative. Orientation - Oriented X3.       Head and Neck  Head - " normocephalic, atraumatic with no lesions or palpable masses.  Neck    Global Assessment - supple.       Eye   Sclera/Conjunctiva - Bilateral - Normal.    ENMT  Mouth and Throat   Oral Cavity/Oropharynx: Oropharynx - the soft palate,uvula and tongue are normal in appearance.    Chest and Lung Exam   Chest - lung clear to auscultation bilaterally.    Cardiovascular   Cardiovascular examination reveals  - normal heart sounds, regular rate and rhythm.    Neurologic   Mental Status: Speech - Normal. Cognitive function - appropriate fund of knowledge. No impairment of attention, Impairment of concentration, impairment of long term memory or impairment of short term memory.  Cranial Nerves:   II Optic: Visual acuity - Left - Normal. Right - Normal. Visual fields - Normal (to confrontation).  III Oculomotor: Pupillary constriction - Left - Normal. Right - Normal.  VII Facial: - Normal Bilaterally.  VIII Acoustic - Bilateral - Hearing normal and (Hearing tested by finger rub).   IX Glossopharyngeal / X Vagus - Normal.  XI Accessory: Trapezius - Bilateral - Normal. Sternocleidomastoid - Bilateral - Normal.  XII Hypoglossal - Bilateral - Normal.  Eye Movements: - Normal Bilaterally.  Sensory:   Light Touch: Intact - Globally.  Motor:   Bulk and Contour: - Normal.  Tone: - Normal.  Tremor: Not present.  Strength: 5/5 normal muscle strength - All Muscles.   General Assessment of Reflexes: - deep tendon reflexes are normal. Coordination - No Impairment of finger-to-nose or Impairment of rapid alternating movements. Gait - Normal.       Result Review :                 Assessment and Plan    Problem List Items Addressed This Visit        Neuro    Observed seizure-like activity (HCC) - Primary    Current Assessment & Plan     76 year old right handed woman who presents with her daughter, Jonny Burch, to neurology clinic for initial evaluation and treatment of possible syncope or stroke like symptoms.  On 9/23/2021 her daughter  gave her a sandwich and all of the sudden she had a blank look on her face and making growling noises and her eyes could not focus in on anything and she was not responding lasting for about 4 minutes.  She did not lose bowel or bladder.  No tongue biting.  Daughter noticed that her lips started turning blue and her grand daughter could not feel a pulse and when her daughter was about to give her PCR and she noticed that patient came back to slowly.  She does not recall anything about the episode.  This was the only spell she ever experienced.  She tells me she had not eaten breakfast when this happened.  Her BP was very high at 198/87 in the hospital.  She had a brain MRI scan done which I reviewed the images independently on her visit today and did not demonstrate any acute intracranial findings but did demonstrate atrophy and possible prior hypertensive microhemorrahges and white matter changes.  I discussed these findings with them.  She had normal carotid doppler and her echocardiogram demonstrated 60% EF.  Her Holter monitoring was read as benign.  She is currently on aspirin and statin.  Her BP continues to be elevated today.  She has a blood pressure cuff at home.  She is not sure of any family history of seizures.  No history of seizures in patient.  She has not had any further spells.  I spoke with them about the possibility of her symptoms being related to focal seizure with ДМИТРИЙ and will order a prolonged EEG to be performed for further evaluation.  Alternatively this maybe related to HTN spikes in her BP causing a short lasting encephalopathy picture.  She will need to have her BP further treated by per PCP.  Discussed seizure precautions including not driving for at least 3 months from her spell.  Will follow up in 2 months for further evaluation.           Relevant Orders    EEG          I spent 46 minutes caring for Adali on this date of service. This time includes time spent by me in the following  activities:preparing for the visit, reviewing tests, obtaining and/or reviewing a separately obtained history, performing a medically appropriate examination and/or evaluation , counseling and educating the patient/family/caregiver, ordering medications, tests, or procedures, documenting information in the medical record, independently interpreting results and communicating that information with the patient/family/caregiver and care coordination    Follow Up   Return in about 2 months (around 12/18/2021).  Patient was given instructions and counseling regarding her condition or for health maintenance advice. Please see specific information pulled into the AVS if appropriate.

## 2021-10-18 NOTE — PATIENT INSTRUCTIONS
[facility]  Reji Gupta MD  Neurology clinic  [phone]    With anti-seizure medications, you may initially notice side effects of fatigue, drowsiness, unsteadiness, and dizziness.  Other possible side effects include nausea, abdominal pain, headache, blurry or double vision, slurred speech and mood changes.  Generally, patients will noticed these symptoms when the medication is first started or with higher doses and will go away with time.    It is import to consistently take your medication every day.  Missing just one dose may put you at risk for a breakthrough seizure.  Consider using reminders on your phone or a pill box.    If you develop a rash, please call the neurology clinic immediately or notify another healthcare professional, as this may be potentially life-threatening.  If you are unable to reach a healthcare professional, go to the emergency room immediately for further evaluation.    If you develop thoughts of wanting to hurt yourself or others, please call the neurology clinic immediately to notify another healthcare professional.  If you are unable to reach a healthcare professional, go to the emergency room immediately for further evaluation.    It is the [place] state law that you cannot drive within 90 days of a seizure.    You should avoid certain activities that if you were to have a seizure, you could harm yourself or others. In general, it is recommended that you avoid operating heavy machinery or power tools, swimming or taking baths by yourself (showers are ok), don't stand over open flames, don't get on high ladders or the roof.  I also recommend to avoid sleeping on your stomach.    For further information on epilepsy and resources available to patients and their families, please visit the [facility] at www.efky.org or call [phone].    **Check out the [facility]'s monthly Art Group Gathering.  They are located  at [facility], [address] [place] [address].  Call Joann Lazo at [phone] or email her at [email or web address] for the dates of future gatherings.**      **If you have having memory problems, consider HOBSCOTCH (Home-Based Self-management and Cognitive Training Changes lives).  It is an 8 week self-management program for adults with epilepsy and memory problems.  The program is free at the [facility].  Contact Gabriella Robins at [phone] or [email or web address].**          In general, we recommend using good judgement when you are doing certain activities and to avoid those activities that if you were to have a seizure, you could harm yourself or others. In the [facility], it is the law that you cannot drive within 90 days of a seizure. We also recommend not standing over open flames, not getting on high ladders or the roof, not swimming or taking baths by yourself (showers are ok) and not operating heavy machinery or power tools.

## 2021-10-18 NOTE — ASSESSMENT & PLAN NOTE
76 year old right handed woman who presents with her daughter, Jonny Burch, to neurology clinic for initial evaluation and treatment of possible syncope or stroke like symptoms.  On 9/23/2021 her daughter gave her a sandwich and all of the sudden she had a blank look on her face and making growling noises and her eyes could not focus in on anything and she was not responding lasting for about 4 minutes.  She did not lose bowel or bladder.  No tongue biting.  Daughter noticed that her lips started turning blue and her grand daughter could not feel a pulse and when her daughter was about to give her PCR and she noticed that patient came back to slowly.  She does not recall anything about the episode.  This was the only spell she ever experienced.  She tells me she had not eaten breakfast when this happened.  Her BP was very high at 198/87 in the hospital.  She had a brain MRI scan done which I reviewed the images independently on her visit today and did not demonstrate any acute intracranial findings but did demonstrate atrophy and possible prior hypertensive microhemorrahges and white matter changes.  I discussed these findings with them.  She had normal carotid doppler and her echocardiogram demonstrated 60% EF.  Her Holter monitoring was read as benign.  She is currently on aspirin and statin.  Her BP continues to be elevated today.  She has a blood pressure cuff at home.  She is not sure of any family history of seizures.  No history of seizures in patient.  She has not had any further spells.  I spoke with them about the possibility of her symptoms being related to focal seizure with ДМИТРИЙ and will order a prolonged EEG to be performed for further evaluation.  Alternatively this maybe related to HTN spikes in her BP causing a short lasting encephalopathy picture.  She will need to have her BP further treated by per PCP.  Discussed seizure precautions including not driving for at least 3 months from her spell.   Will follow up in 2 months for further evaluation.

## 2021-10-19 ENCOUNTER — TELEPHONE (OUTPATIENT)
Dept: INTERNAL MEDICINE | Facility: CLINIC | Age: 76
End: 2021-10-19

## 2021-10-19 DIAGNOSIS — I10 ESSENTIAL HYPERTENSION: ICD-10-CM

## 2021-10-19 DIAGNOSIS — I16.0 HYPERTENSIVE URGENCY: ICD-10-CM

## 2021-10-19 NOTE — TELEPHONE ENCOUNTER
JOCELYN FROM  IMAGING CALLED TO REQUEST DR JOHNSON TO SIGN ORDER IN Epic FOR US Color Flow Doppler Carotid Bilateral

## 2021-10-19 NOTE — TELEPHONE ENCOUNTER
There is an order from 10-, it looks like it was input by Dr. Diaz. I cannot sign this for her via access.

## 2021-10-21 ENCOUNTER — PATIENT ROUNDING (BHMG ONLY) (OUTPATIENT)
Dept: NEUROLOGY | Facility: CLINIC | Age: 76
End: 2021-10-21

## 2021-10-21 DIAGNOSIS — I16.0 HYPERTENSIVE URGENCY: Primary | ICD-10-CM

## 2021-10-21 NOTE — PROGRESS NOTES
"October 21, 2021    Hello, may I speak with Adali Hankins?    My name is Sonia     I am  with [facility]  [address] [address] [phone].    Before we get started may I verify your date of birth? 1945    I am calling to officially welcome you to our practice and ask about your recent visit. Is this a good time to talk? yes    Tell me about your visit with us. What things went well? The visit went very well. The doctor was \"right on\". I really enjoyed him. He was able to explain things to me in a way that I could understand.      We're always looking for ways to make our patients' experiences even better. Do you have recommendations on ways we may improve?  no    Overall were you satisfied with your first visit to our practice? yes       I appreciate you taking the time to speak with me today. Is there anything else I can do for you? no      Thank you, and have a great day.      "

## 2021-10-22 ENCOUNTER — HOSPITAL ENCOUNTER (OUTPATIENT)
Dept: ULTRASOUND IMAGING | Facility: HOSPITAL | Age: 76
End: 2021-10-22

## 2021-10-26 ENCOUNTER — HOSPITAL ENCOUNTER (OUTPATIENT)
Dept: NEUROLOGY | Facility: HOSPITAL | Age: 76
Discharge: HOME | End: 2021-10-26
Admitting: PSYCHIATRY & NEUROLOGY

## 2021-10-26 DIAGNOSIS — R56.9 OBSERVED SEIZURE-LIKE ACTIVITY (HCC): ICD-10-CM

## 2021-10-26 PROCEDURE — 95813 EEG EXTND MNTR 61-119 MIN: CPT | Performed by: PSYCHIATRY & NEUROLOGY

## 2021-10-26 PROCEDURE — 95813 EEG EXTND MNTR 61-119 MIN: CPT

## 2021-10-29 ENCOUNTER — PATIENT OUTREACH (OUTPATIENT)
Dept: CASE MANAGEMENT | Facility: OTHER | Age: 76
End: 2021-10-29

## 2021-10-29 NOTE — OUTREACH NOTE
Ambulatory Case Management Note    Patient Outreach  Talked with patient. Patient states to be compliant with medications; medical appointments and monitoring /logging  blood pressure 2 times per day. States blood pressures WNL's below 140/80 (today's 140/65). Patient confirms recent neurology appointment and recommendations. Patient receiving assistance with transportation at this time.  Patient states no difficulty with SOB; chest pain; appetite; sleeping or syncopal episode. Reviewed with patient education; HTN Care Plan and will send educational material. Patient verbalized understanding. HRCM continues, Additional outreach scheduled.      Care Plan: Hypertension   Updates made since 10/29/2021 12:00 AM      Problem: LIFESTYLE CHOICES       Goal: Healthy food choice       Task: Provide resources for diet management such as calorie counting and sodium reduction Completed 10/29/2021   Responsible User: Ramila Blanton RN      Task: Provide community resources for healthy food options Completed 10/29/2021   Responsible User: Ramila Blanton RN      Task: Discuss healthy food options and preferred restaurants Completed 10/29/2021   Responsible User: Ramila Blanton RN Teresita Tackett, RN  Ambulatory Case Management    10/29/2021, 15:40 EDT

## 2021-11-01 ENCOUNTER — HOSPITAL ENCOUNTER (OUTPATIENT)
Dept: ULTRASOUND IMAGING | Facility: HOSPITAL | Age: 76
Discharge: HOME | End: 2021-11-01
Admitting: INTERNAL MEDICINE

## 2021-11-01 PROCEDURE — 76775 US EXAM ABDO BACK WALL LIM: CPT

## 2021-11-11 ENCOUNTER — HOSPITAL ENCOUNTER (OUTPATIENT)
Dept: CARDIOLOGY | Facility: HOSPITAL | Age: 76
Discharge: HOME | End: 2021-11-11
Admitting: INTERNAL MEDICINE

## 2021-11-11 LAB
BH CV ECHO MEAS - DIST REN A EDV LEFT: 17.4 CM/SEC
BH CV ECHO MEAS - DIST REN A PSV LEFT: 68.4 CM/SEC
BH CV ECHO MEAS - DIST REN A RI LEFT: 0.76
BH CV ECHO MEAS - HILAR A EDV LEFT: -7.5 CM/SEC
BH CV ECHO MEAS - HILAR A PSV LEFT: -36.3 CM/SEC
BH CV ECHO MEAS - HILAR A RI LEFT: 0.79
BH CV ECHO MEAS - MID REN A EDV LEFT: 18.8 CM/SEC
BH CV ECHO MEAS - MID REN A PSV LEFT: 88 CM/SEC
BH CV ECHO MEAS - MID REN A RI LEFT: 0.76
BH CV ECHO MEAS - PROX REN A EDV LEFT: 13.5 CM/SEC
BH CV ECHO MEAS - PROX REN A PSV LEFT: 63.9 CM/SEC
BH CV ECHO MEAS - PROX REN A RI LEFT: 0.8
BH CV VAS BP LEFT ARM: NORMAL MMHG
BH CV VAS BP RIGHT ARM: NORMAL MMHG
BH CV VAS RENAL AORTIC MID PSV: 76.5 CM/S
BH CV VAS RENAL HILUM LEFT EDV: 8.7 CM/S
BH CV VAS RENAL HILUM LEFT PSV: 36.4 CM/S
BH CV VAS RENAL HILUM RIGHT EDV: 9.8 CM/S
BH CV VAS RENAL HILUM RIGHT PSV: 42.4 CM/S
BH CV XLRA MEAS - KID L LEFT: 9.35 CM
BH CV XLRA MEAS - RENAL A ORG RI LEFT: 0.74
BH CV XLRA MEAS DIST REN A EDV RIGHT: 16.4 CM/SEC
BH CV XLRA MEAS DIST REN A PSV RIGHT: 60.6 CM/SEC
BH CV XLRA MEAS DIST REN A RI RIGHT: 0.69
BH CV XLRA MEAS HILAR A EDV RIGHT: 9.8 CM/SEC
BH CV XLRA MEAS HILAR A PSV RIGHT: 42.4 CM/SEC
BH CV XLRA MEAS HILAR A RI RIGHT: 0.77
BH CV XLRA MEAS KID L RIGHT: 9.06 CM
BH CV XLRA MEAS KID W RIGHT: 3.77 CM
BH CV XLRA MEAS MID REN A EDV RIGHT: 22.8 CM/SEC
BH CV XLRA MEAS MID REN A PSV RIGHT: 83.9 CM/SEC
BH CV XLRA MEAS MID REN A RI RIGHT: 0.8
BH CV XLRA MEAS PROX REN A EDV RIGHT: 21.3 CM/SEC
BH CV XLRA MEAS PROX REN A PSV RIGHT: 82.3 CM/SEC
BH CV XLRA MEAS PROX REN A RI RIGHT: 0.74
BH CV XLRA MEAS RENAL A ORG EDV LEFT: 17.3 CM/SEC
BH CV XLRA MEAS RENAL A ORG EDV RIGHT: 19.1 CM/SEC
BH CV XLRA MEAS RENAL A ORG PSV LEFT: 78.7 CM/SEC
BH CV XLRA MEAS RENAL A ORG PSV RIGHT: 78 CM/SEC
BH CV XLRA MEAS RENAL A ORG RI RIGHT: 0.78
LEFT KIDNEY WIDTH: 5.37 CM
LEFT RENAL UPPER PARENCHYMA MAX: 16.1 CM/S
LEFT RENAL UPPER PARENCHYMA MIN: 4.61 CM/S
LEFT RENAL UPPER PARENCHYMA RI: 0.71
RIGHT RENAL UPPER PARENCHYMA MAX: 16.1 CM/S
RIGHT RENAL UPPER PARENCHYMA MIN: 4.61 CM/S
RIGHT RENAL UPPER PARENCHYMA RI: 0.71

## 2021-11-11 PROCEDURE — 93975 VASCULAR STUDY: CPT

## 2021-11-26 DIAGNOSIS — I10 ESSENTIAL HYPERTENSION: ICD-10-CM

## 2021-11-29 RX ORDER — LISINOPRIL 20 MG/1
TABLET ORAL
Qty: 90 TABLET | Refills: 1 | Status: SHIPPED | OUTPATIENT
Start: 2021-11-29 | End: 2022-07-18

## 2021-12-06 ENCOUNTER — PATIENT OUTREACH (OUTPATIENT)
Dept: CASE MANAGEMENT | Facility: OTHER | Age: 76
End: 2021-12-06

## 2021-12-06 NOTE — OUTREACH NOTE
Ambulatory Case Management Note  Patient Outreach  Talked with patient. Patient states to be doing well; compliant with medications; monitoring blood pressure with values of 119/60 HR 80; 122/52 HR 79; 142/79 HR 79. She states no difficulty with dizziness; syncope; chest pain; SOB; appetite or sleeping. She states to be walking about 20 minutes daily. Reviewed with patient eduction; medications; appointments; and HTN Care plan. Patient verbalized understanding. HRCM continues.     Care Plan: Hypertension   Updates made since 12/6/2021 12:00 AM      Problem: PATIENT IS INACTIVE       Goal: Exercise at least 20 minutes per day       Task: Develop exercise plan with patient Completed 12/6/2021   Responsible User: Ramila Blanton, RN          Ramila Blanton RN  Ambulatory Case Management    12/6/2021, 17:21 EST

## 2021-12-27 ENCOUNTER — OFFICE VISIT (OUTPATIENT)
Dept: NEUROLOGY | Facility: CLINIC | Age: 76
End: 2021-12-27

## 2021-12-27 ENCOUNTER — LAB (OUTPATIENT)
Dept: LAB | Facility: HOSPITAL | Age: 76
End: 2021-12-27

## 2021-12-27 VITALS
OXYGEN SATURATION: 100 % | DIASTOLIC BLOOD PRESSURE: 78 MMHG | WEIGHT: 124 LBS | BODY MASS INDEX: 24.35 KG/M2 | HEIGHT: 60 IN | SYSTOLIC BLOOD PRESSURE: 142 MMHG | HEART RATE: 94 BPM

## 2021-12-27 DIAGNOSIS — R41.3 MEMORY IMPAIRMENT: ICD-10-CM

## 2021-12-27 DIAGNOSIS — R56.9 OBSERVED SEIZURE-LIKE ACTIVITY (HCC): Primary | ICD-10-CM

## 2021-12-27 LAB
FOLATE SERPL-MCNC: 16.1 NG/ML (ref 4.78–24.2)
VIT B12 BLD-MCNC: 384 PG/ML (ref 211–946)

## 2021-12-27 PROCEDURE — 36415 COLL VENOUS BLD VENIPUNCTURE: CPT

## 2021-12-27 PROCEDURE — 99214 OFFICE O/P EST MOD 30 MIN: CPT | Performed by: PSYCHIATRY & NEUROLOGY

## 2021-12-27 PROCEDURE — 82746 ASSAY OF FOLIC ACID SERUM: CPT

## 2021-12-27 PROCEDURE — 82607 VITAMIN B-12: CPT

## 2021-12-27 RX ORDER — UBIDECARENONE 100 MG
100 CAPSULE ORAL DAILY
COMMUNITY

## 2021-12-27 NOTE — ASSESSMENT & PLAN NOTE
They tell me she has had some trouble with her short term memory.  She will make a cup of coffee and forget and make another one or misplace her medicines.  They went to the mall and had lunch and shortly after she asked if they should grab lunch.  They report her brother who is 2 years younger has had some memory issues as well.  She has had lab work including CMP, TSH and globulin levels which I reviewed today.  She has had some sundowning at night time where she seems more confused at night time.  I will check Vit B12, folate and Vit D levels.  I will also refer her to have formal neuropsychological testing done as well.  I advised her to exercise and socialize.  Discussed safety issues in memory impairment including not cooking over a stove or open flame.

## 2021-12-27 NOTE — ASSESSMENT & PLAN NOTE
76 year old woman with possible seizure like activity and normal prolonged EEG recently.  On 9/23/2021 her daughter gave her a sandwich and all of the sudden she had a blank look on her face and making growling noises and her eyes could not focus in on anything and she was not responding lasting for about 4 minutes.  She did not lose bowel or bladder.  No tongue biting.  Daughter noticed that her lips started turning blue and her grand daughter could not feel a pulse and when her daughter was about to give her PCR and she noticed that patient came back to slowly.  She does not recall anything about the episode.  This was the only spell she ever experienced.  She tells me she had not eaten breakfast when this happened.  Her BP was very high at 198/87 in the hospital.  She had a brain MRI scan done which I reviewed the images independently on her visit today and did not demonstrate any acute intracranial findings but did demonstrate atrophy and possible prior hypertensive microhemorrahges and white matter changes.  She had normal carotid doppler and her echocardiogram demonstrated 60% EF.  Her Holter monitoring was read as benign.  She is currently on aspirin and statin.  Her BP looks better today and it was elevated on prior visit.  She has a blood pressure cuff at home.  She is not sure of any family history of seizures.  No history of seizures in patient.  She has not had any further spells.  She did not have a headache or migraine.   I spoke with them about the possibility of her symptoms being related to focal seizure with ДМИТРИЙ and will order a prolonged EEG to be performed for further evaluation.  Alternatively this maybe related to HTN spikes in her BP causing a short lasting encephalopathy picture.  She will need to have her BP further treated by per PCP.  Discussed seizure precautions including not driving for at least 3 months from her spell.  She has not had any further spells.

## 2021-12-27 NOTE — PROGRESS NOTES
Chief Complaint  Seizures (EEG f/u) Patient presents today with their daughter, and  ( on the phone) and has given consent to give health information to them.       Subjective          Adali Hankins presents to [facility] for   HISTORY OF PRESENT ILLNESS:    Adali Hankins is a 76 year old right handed woman who returns with her daughter, Jonny Burch, and her  on the phone to neurology clinic for follow up evaluation and treatment of possible syncope or seizure like activity.  On 9/23/2021 her daughter gave her a sandwich and all of the sudden she had a blank look on her face and making growling noises and her eyes could not focus in on anything and she was not responding lasting for about 4 minutes.  She did not lose bowel or bladder.  No tongue biting.  Daughter noticed that her lips started turning blue and her grand daughter could not feel a pulse and when her daughter was about to give her PCR and she noticed that patient came back to slowly.  She does not recall anything about the episode.  This was the only spell she ever experienced.  She tells me she had not eaten breakfast when this happened.  Her BP was very high at 198/87 in the hospital.  She had a brain MRI scan done which I reviewed the images independently on her visit today and did not demonstrate any acute intracranial findings but did demonstrate atrophy and possible prior hypertensive microhemorrahges and white matter changes.  She had normal carotid doppler and her echocardiogram demonstrated 60% EF.  Her Holter monitoring was read as benign.  She is currently on aspirin and statin.  Her BP continues to be elevated today.  She has a blood pressure cuff at home.  She is not sure of any family history of seizures.  No history of seizures in patient.  She has not had any further spells.  She did not have a headache or migraine.  She had a normal prolonged awake and sleep EEG since her last visit and she has  "not had any further spells.  They tell me she has had some trouble with her short term memory.  She will make a cup of coffee and forget and make another one or misplace her medicines.  They went to the mall and had lunch and shortly after she asked if they should grab lunch.  They report her brother who is 2 years younger has had some memory issues as well.  She has had lab work including CMP, TSH and globulin levels which I reviewed today.  She has had some sundowning at night time where she seems more confused at night time.  They have POA and living will.      Past Medical History:   Diagnosis Date   • Arthritis    • Cataracts, bilateral    • Environmental allergies    • History of carcinoma in situ of cervix uteri    • Hyperlipidemia    • Hypertension    • Hypoglycemia    • Memory loss    • TIA (transient ischemic attack)    • Urethral cyst 6/23/2017   • Vaginal prolapse         Family History   Problem Relation Age of Onset   • Cancer Other         colon   • Hypertension Other    • Stroke Brother    • Breast cancer Neg Hx         Social History     Socioeconomic History   • Marital status:    Tobacco Use   • Smoking status: Former Smoker   Substance and Sexual Activity   • Alcohol use: No   • Drug use: No   • Sexual activity: Defer        I have personally reviewed the ROS as stated below.     Review of Systems   HENT: Positive for hearing loss.    Neurological: Negative for dizziness, tremors, seizures, syncope, facial asymmetry, speech difficulty, weakness, light-headedness, numbness, headache, memory problem and confusion.   Psychiatric/Behavioral: Negative for agitation, behavioral problems, decreased concentration, dysphoric mood, hallucinations, self-injury, sleep disturbance, suicidal ideas, negative for hyperactivity, depressed mood and stress. The patient is not nervous/anxious.         Objective   Vital Signs:   /78   Pulse 94   Ht 152.4 cm (60\")   Wt 56.2 kg (124 lb)   SpO2 100%   " BMI 24.22 kg/m²       PHYSICAL EXAM:    General   Mental Status - Alert. General Appearance - Well developed, Well groomed, Oriented and Cooperative. Orientation - Oriented X3.       Head and Neck  Head - normocephalic, atraumatic with no lesions or palpable masses.  Neck    Global Assessment - supple.       Eye   Sclera/Conjunctiva - Bilateral - Normal.    ENMT  Mouth and Throat   Oral Cavity/Oropharynx: Oropharynx - the soft palate,uvula and tongue are normal in appearance.    Chest and Lung Exam   Chest - lung clear to auscultation bilaterally.    Cardiovascular   Cardiovascular examination reveals  - normal heart sounds, regular rate and rhythm.    Neurologic   Mental Status: Speech - Normal. Cognitive function - appropriate fund of knowledge. No impairment of attention, Impairment of concentration, impairment of long term memory or impairment of short term memory.  Cranial Nerves:   II Optic: Visual acuity - Left - Normal. Right - Normal. Visual fields - Normal (to confrontation).  III Oculomotor: Pupillary constriction - Left - Normal. Right - Normal.  VII Facial: - Normal Bilaterally.  VIII Acoustic - Bilateral - Hearing normal and (Hearing tested by finger rub).   IX Glossopharyngeal / X Vagus - Normal.  XI Accessory: Trapezius - Bilateral - Normal. Sternocleidomastoid - Bilateral - Normal.  XII Hypoglossal - Bilateral - Normal.  Eye Movements: - Normal Bilaterally.  Sensory:   Light Touch: Intact - Globally.  Motor:   Bulk and Contour: - Normal.  Tone: - Normal.  Tremor: Not present.  Strength: 5/5 normal muscle strength - All Muscles.   General Assessment of Reflexes: - deep tendon reflexes are normal. Coordination - No Impairment of finger-to-nose or Impairment of rapid alternating movements. Gait - Normal.       Result Review :                 Assessment and Plan {CC Problem List  Visit Diagnosis  ROS  Review (Popup)  Health Maintenance  Quality  BestPractice  Medications  SmartSets  SnapShot  Encounters  Media :23}   Problem List Items Addressed This Visit        Neuro    Observed seizure-like activity (HCC) - Primary    Current Assessment & Plan     76 year old woman with possible seizure like activity and normal prolonged EEG recently.  On 9/23/2021 her daughter gave her a sandwich and all of the sudden she had a blank look on her face and making growling noises and her eyes could not focus in on anything and she was not responding lasting for about 4 minutes.  She did not lose bowel or bladder.  No tongue biting.  Daughter noticed that her lips started turning blue and her grand daughter could not feel a pulse and when her daughter was about to give her PCR and she noticed that patient came back to slowly.  She does not recall anything about the episode.  This was the only spell she ever experienced.  She tells me she had not eaten breakfast when this happened.  Her BP was very high at 198/87 in the hospital.  She had a brain MRI scan done which I reviewed the images independently on her visit today and did not demonstrate any acute intracranial findings but did demonstrate atrophy and possible prior hypertensive microhemorrahges and white matter changes.  She had normal carotid doppler and her echocardiogram demonstrated 60% EF.  Her Holter monitoring was read as benign.  She is currently on aspirin and statin.  Her BP looks better today and it was elevated on prior visit.  She has a blood pressure cuff at home.  She is not sure of any family history of seizures.  No history of seizures in patient.  She has not had any further spells.  She did not have a headache or migraine.   I spoke with them about the possibility of her symptoms being related to focal seizure with ДМИТРИЙ and will order a prolonged EEG to be performed for further evaluation.  Alternatively this maybe related to HTN spikes in her BP causing a short lasting encephalopathy picture.  She will need to have her BP further treated by per  PCP.  Discussed seizure precautions including not driving for at least 3 months from her spell.  She has not had any further spells.          Memory impairment    Current Assessment & Plan      They tell me she has had some trouble with her short term memory.  She will make a cup of coffee and forget and make another one or misplace her medicines.  They went to the mall and had lunch and shortly after she asked if they should grab lunch.  They report her brother who is 2 years younger has had some memory issues as well.  She has had lab work including CMP, TSH and globulin levels which I reviewed today.  She has had some sundowning at night time where she seems more confused at night time.  I will check Vit B12, folate and Vit D levels.  I will also refer her to have formal neuropsychological testing done as well.  I advised her to exercise and socialize.  Discussed safety issues in memory impairment including not cooking over a stove or open flame.           Relevant Orders    Vitamin B12    Folate    Ambulatory Referral to Neuropsychology          I spent 32 minutes caring for Adali on this date of service. This time includes time spent by me in the following activities:preparing for the visit, reviewing tests, obtaining and/or reviewing a separately obtained history, performing a medically appropriate examination and/or evaluation , counseling and educating the patient/family/caregiver, ordering medications, tests, or procedures, documenting information in the medical record and care coordination    Follow Up   No follow-ups on file.  Patient was given instructions and counseling regarding her condition or for health maintenance advice. Please see specific information pulled into the AVS if appropriate.

## 2022-01-12 ENCOUNTER — PATIENT OUTREACH (OUTPATIENT)
Dept: CASE MANAGEMENT | Facility: OTHER | Age: 77
End: 2022-01-12

## 2022-01-12 NOTE — OUTREACH NOTE
Ambulatory Case Management Note  Patient Outreach  Talked with patient. Patient states compliant medications; medical appointments(has assistance from daughter)  and confirms appointments with optometry; neurology and PCP. Patient states to have episodes of eyes burning; has good vision and will contact optometrist regarding contacts and recommendations.  Patient states to be monitoring blood pressure and values have improved ranging below 140/80 most of the time.States no difficulty with fever; chest pain; SOB; appetite or sleeping. Reviewed with patient education; diet; blood pressures; medications; 24/7 Nurse Line Telephone number and care gaps. Patient verbalized understanding and states to appreciate patient outreach. No further questions voiced at this time.     Ramila Blanton RN  Ambulatory Case Management    1/12/2022, 12:40 EST

## 2022-01-25 ENCOUNTER — TELEPHONE (OUTPATIENT)
Dept: INTERNAL MEDICINE | Facility: CLINIC | Age: 77
End: 2022-01-25

## 2022-01-25 NOTE — TELEPHONE ENCOUNTER
Caller: MO    Relationship: DAUGHTER    Best call back number: [phone]    Which medication are you concerned about: metoprolol succinate XL (Toprol XL) 25 MG 24 hr tablet    Who prescribed you this medication: DR JOHNSON    What are your concerns: PATIENT'S DAUGHTER IS CALLING TO MAKE SURE SHE IS STILL SUPPOSED TO TAKE THIS MEDICATION.  IF SHE IS SUPPOSED TO BE TAKING IT, SHE WILL NEED A REFILL SENT TO [id number] - [place], [place] - [address] [facility] [phone] [facility] [phone]   [phone]

## 2022-01-26 ENCOUNTER — TELEPHONE (OUTPATIENT)
Dept: INTERNAL MEDICINE | Facility: CLINIC | Age: 77
End: 2022-01-26

## 2022-01-26 DIAGNOSIS — I10 ESSENTIAL HYPERTENSION: ICD-10-CM

## 2022-01-26 RX ORDER — METOPROLOL SUCCINATE 25 MG/1
25 TABLET, EXTENDED RELEASE ORAL DAILY
Qty: 30 TABLET | Refills: 0 | Status: SHIPPED | OUTPATIENT
Start: 2022-01-26 | End: 2022-01-27

## 2022-01-27 RX ORDER — METOPROLOL SUCCINATE 25 MG/1
TABLET, EXTENDED RELEASE ORAL
Qty: 90 TABLET | Refills: 3 | Status: SHIPPED | OUTPATIENT
Start: 2022-01-27 | End: 2022-12-14 | Stop reason: SDUPTHER

## 2022-02-02 ENCOUNTER — PATIENT OUTREACH (OUTPATIENT)
Dept: CASE MANAGEMENT | Facility: OTHER | Age: 77
End: 2022-02-02

## 2022-02-02 NOTE — OUTREACH NOTE
Ambulatory Case Management Note  Patient Outreach  RN-ACM patient outreach. Talked with jalyn. Patient confirms 1/7/22 PCP appointment; states to have received good blood work results; compliant with medications and monitoring of blood pressuree (WNL's). Patient states no difficulty with chest pain; SOB; appetite  or sleeping. Patient compliant with medical appointments and states will continue to follow up as needed. Reviewed with patient education; care gaps; 24/7 Nurse Line telephone number. Patient verbalized understanding and states to appreciate patient outreach.Patient has met care plan goals, all care gaps have been addressed, and patient has a strong sense of health self-management.       Ramila Blanton RN  Ambulatory Case Management    2/2/2022, 13:07 EST

## 2022-07-18 DIAGNOSIS — I10 ESSENTIAL HYPERTENSION: ICD-10-CM

## 2022-07-18 RX ORDER — LISINOPRIL 20 MG/1
TABLET ORAL
Qty: 90 TABLET | Refills: 1 | Status: SHIPPED | OUTPATIENT
Start: 2022-07-18 | End: 2022-08-10

## 2022-07-21 DIAGNOSIS — I10 ESSENTIAL HYPERTENSION: ICD-10-CM

## 2022-07-21 NOTE — TELEPHONE ENCOUNTER
Caller: Adali Hankins    Relationship: Self    Best call back number: [phone]    Requested Prescriptions:   Requested Prescriptions     Pending Prescriptions Disp Refills   • lisinopril (PRINIVIL,ZESTRIL) 20 MG tablet 90 tablet 1     Sig: Take 1 tablet by mouth Daily.        Pharmacy where request should be sent: [facility] ([facility]) - [address] - [phone]  - [phone] FX     Additional details provided by patient: 5-10 DAY SUPPLY    Does the patient have less than a 3 day supply:  [] Yes  [x] No    Milagros YOST Rep   07/21/22 14:25 EDT

## 2022-07-22 RX ORDER — LISINOPRIL 20 MG/1
20 TABLET ORAL DAILY
Qty: 90 TABLET | Refills: 1 | OUTPATIENT
Start: 2022-07-22

## 2022-08-08 ENCOUNTER — OFFICE VISIT (OUTPATIENT)
Dept: INTERNAL MEDICINE | Facility: CLINIC | Age: 77
End: 2022-08-08

## 2022-08-08 VITALS
HEIGHT: 60 IN | WEIGHT: 135.8 LBS | OXYGEN SATURATION: 99 % | RESPIRATION RATE: 18 BRPM | SYSTOLIC BLOOD PRESSURE: 158 MMHG | BODY MASS INDEX: 26.66 KG/M2 | HEART RATE: 84 BPM | DIASTOLIC BLOOD PRESSURE: 102 MMHG | TEMPERATURE: 97.1 F

## 2022-08-08 DIAGNOSIS — E78.5 HYPERLIPIDEMIA, UNSPECIFIED HYPERLIPIDEMIA TYPE: ICD-10-CM

## 2022-08-08 DIAGNOSIS — I10 ESSENTIAL HYPERTENSION: ICD-10-CM

## 2022-08-08 DIAGNOSIS — Z00.00 MEDICARE ANNUAL WELLNESS VISIT, SUBSEQUENT: Primary | ICD-10-CM

## 2022-08-08 DIAGNOSIS — R73.9 HYPERGLYCEMIA: ICD-10-CM

## 2022-08-08 DIAGNOSIS — Z12.31 BREAST CANCER SCREENING BY MAMMOGRAM: ICD-10-CM

## 2022-08-08 DIAGNOSIS — Z00.00 HEALTHCARE MAINTENANCE: ICD-10-CM

## 2022-08-08 DIAGNOSIS — Z78.0 POST-MENOPAUSAL: ICD-10-CM

## 2022-08-08 DIAGNOSIS — R41.3 MEMORY CHANGES: ICD-10-CM

## 2022-08-08 PROCEDURE — 1160F RVW MEDS BY RX/DR IN RCRD: CPT | Performed by: INTERNAL MEDICINE

## 2022-08-08 PROCEDURE — 1170F FXNL STATUS ASSESSED: CPT | Performed by: INTERNAL MEDICINE

## 2022-08-08 PROCEDURE — 99397 PER PM REEVAL EST PAT 65+ YR: CPT | Performed by: INTERNAL MEDICINE

## 2022-08-08 PROCEDURE — 96160 PT-FOCUSED HLTH RISK ASSMT: CPT | Performed by: INTERNAL MEDICINE

## 2022-08-08 PROCEDURE — 99214 OFFICE O/P EST MOD 30 MIN: CPT | Performed by: INTERNAL MEDICINE

## 2022-08-08 PROCEDURE — G0439 PPPS, SUBSEQ VISIT: HCPCS | Performed by: INTERNAL MEDICINE

## 2022-08-08 NOTE — PROGRESS NOTES
Adali Hankins is a 76 y.o. female, who presents with a chief complaint of   Chief Complaint   Patient presents with   • Medicare Wellness-subsequent           HPI   Pt here for follow up today with her  who helps provide history.     She hasnt been exercising much bc of the weather.      HTN - bp high today at 158/102.  recheck 180/90.  Pt already on lisinopril and metoprolol.  Pt forgot her home bp log and most home bp's 120-130/60-70's.  No ha/dizziness.  She says she doesn't eat much salt.      Seizure like episode - no further episodes.  initial eeg normal.  Pt saw neurology back in January but hasn't been back.  She cont to have memory issues and neurology has placed orders for neuropsych evaluation.  She never got an appt for neuropsych. Folate and b12 levels normal.      HLD - on crestor.  No cramps or myalgias.     Pt due for labs but she ate today.     The following portions of the patient's history were reviewed and updated as appropriate: allergies, current medications, past family history, past medical history, past social history, past surgical history and problem list.    Allergies: Patient has no known allergies.    Review of Systems   Constitutional: Negative.    HENT: Negative.    Eyes: Negative.    Respiratory: Negative.    Cardiovascular: Negative.    Gastrointestinal: Negative.    Endocrine: Negative.    Genitourinary: Negative.    Musculoskeletal: Negative.    Skin: Negative.    Allergic/Immunologic: Negative.    Neurological: Negative.    Hematological: Negative.    Psychiatric/Behavioral: Negative.    All other systems reviewed and are negative.            Wt Readings from Last 3 Encounters:   08/08/22 61.6 kg (135 lb 12.8 oz)   12/27/21 56.2 kg (124 lb)   10/18/21 56.2 kg (124 lb)     Temp Readings from Last 3 Encounters:   08/08/22 97.1 °F (36.2 °C) (Tympanic)   09/20/21 98 °F (36.7 °C)   09/19/21 97.8 °F (36.6 °C) (Oral)     BP Readings from Last 3 Encounters:   08/08/22 (!)  158/102   12/27/21 142/78   10/18/21 150/88     Pulse Readings from Last 3 Encounters:   08/08/22 84   12/27/21 94   10/18/21 83     Body mass index is 26.52 kg/m².  SpO2 Readings from Last 3 Encounters:   08/08/22 99%   12/27/21 100%   10/18/21 100%          Physical Exam  Vitals and nursing note reviewed.   Constitutional:       General: She is not in acute distress.     Appearance: She is well-developed.   HENT:      Head: Normocephalic and atraumatic.      Right Ear: External ear normal.      Left Ear: External ear normal.      Nose: Nose normal.   Eyes:      Conjunctiva/sclera: Conjunctivae normal.      Pupils: Pupils are equal, round, and reactive to light.   Cardiovascular:      Rate and Rhythm: Normal rate and regular rhythm.      Heart sounds: Normal heart sounds.   Pulmonary:      Effort: Pulmonary effort is normal. No respiratory distress.      Breath sounds: Normal breath sounds. No wheezing.   Musculoskeletal:         General: Normal range of motion.      Cervical back: Normal range of motion and neck supple.      Comments: Normal gait   Skin:     General: Skin is warm and dry.   Neurological:      Mental Status: She is alert and oriented to person, place, and time.   Psychiatric:         Behavior: Behavior normal.         Thought Content: Thought content normal.         Judgment: Judgment normal.         Results for orders placed or performed in visit on 01/12/22   Comprehensive Metabolic Panel    Specimen: Blood   Result Value Ref Range    Glucose 93 65 - 99 mg/dL    BUN 12 8 - 27 mg/dL    Creatinine 0.85 0.57 - 1.00 mg/dL    eGFR Non African Am 66 >59 mL/min/1.73    eGFR African Am 76 >59 mL/min/1.73    BUN/Creatinine Ratio 14 12 - 28    Sodium 143 134 - 144 mmol/L    Potassium 3.9 3.5 - 5.2 mmol/L    Chloride 103 96 - 106 mmol/L    Total CO2 27 20 - 29 mmol/L    Calcium 9.7 8.7 - 10.3 mg/dL    Total Protein 7.0 6.0 - 8.5 g/dL    Albumin 4.5 3.7 - 4.7 g/dL    Globulin 2.5 1.5 - 4.5 g/dL    A/G Ratio  1.8 1.2 - 2.2    Total Bilirubin 0.7 0.0 - 1.2 mg/dL    Alkaline Phosphatase 52 44 - 121 IU/L    AST (SGOT) 16 0 - 40 IU/L    ALT (SGPT) 8 0 - 32 IU/L   T4, Free    Specimen: Blood   Result Value Ref Range    Free T4 1.50 0.82 - 1.77 ng/dL   TSH    Specimen: Blood   Result Value Ref Range    TSH 0.819 0.450 - 4.500 uIU/mL   Lipid Panel With LDL / HDL Ratio    Specimen: Blood   Result Value Ref Range    Total Cholesterol 192 100 - 199 mg/dL    Triglycerides 98 0 - 149 mg/dL    HDL Cholesterol 70 >39 mg/dL    VLDL Cholesterol Dilshad 17 5 - 40 mg/dL    LDL Chol Calc (NIH) 105 (H) 0 - 99 mg/dL    LDL/HDL RATIO 1.5 0.0 - 3.2 ratio   Hemoglobin A1c    Specimen: Blood   Result Value Ref Range    Hemoglobin A1C 5.6 4.8 - 5.6 %   CBC & Differential    Specimen: Blood   Result Value Ref Range    WBC 2.8 (L) 3.4 - 10.8 x10E3/uL    RBC 4.37 3.77 - 5.28 x10E6/uL    Hemoglobin 13.4 11.1 - 15.9 g/dL    Hematocrit 39.0 34.0 - 46.6 %    MCV 89 79 - 97 fL    MCH 30.7 26.6 - 33.0 pg    MCHC 34.4 31.5 - 35.7 g/dL    RDW 12.6 11.7 - 15.4 %    Platelets 135 (L) 150 - 450 x10E3/uL    Neutrophil Rel % 49 Not Estab. %    Lymphocyte Rel % 38 Not Estab. %    Monocyte Rel % 10 Not Estab. %    Eosinophil Rel % 2 Not Estab. %    Basophil Rel % 1 Not Estab. %    Neutrophils Absolute 1.4 1.4 - 7.0 x10E3/uL    Lymphocytes Absolute 1.0 0.7 - 3.1 x10E3/uL    Monocytes Absolute 0.3 0.1 - 0.9 x10E3/uL    Eosinophils Absolute 0.0 0.0 - 0.4 x10E3/uL    Basophils Absolute 0.0 0.0 - 0.2 x10E3/uL    Immature Granulocyte Rel % 0 Not Estab. %    Immature Grans Absolute 0.0 0.0 - 0.1 x10E3/uL     Result Review :                  Assessment and Plan    Diagnoses and all orders for this visit:    1. Medicare annual wellness visit, subsequent (Primary)  -     Comprehensive Metabolic Panel; Future  -     CBC & Differential; Future  -     Lipid Panel With LDL / HDL Ratio; Future  -     Hemoglobin A1c; Future  -     Mammo Screening Bilateral With CAD; Future  -      DEXA Bone Density Axial; Future  -     Hepatitis C Antibody    2. Breast cancer screening by mammogram  -     Mammo Screening Bilateral With CAD; Future    3. Post-menopausal  -     DEXA Bone Density Axial; Future    4. Essential hypertension - bp very elevated in clinic.  Pt asymptomatic.  Will double lisinopril and recheck later this week.  Pt to bring her home machine to check at f/u appt.  -     Comprehensive Metabolic Panel; Future  -     CBC & Differential; Future  -     Lipid Panel With LDL / HDL Ratio; Future    5. Hyperlipidemia, unspecified hyperlipidemia type  -     Comprehensive Metabolic Panel; Future  -     Lipid Panel With LDL / HDL Ratio; Future    6. Memory changes  -     Ambulatory Referral to Neuropsychology    7. Hyperglycemia  -     Comprehensive Metabolic Panel; Future  -     CBC & Differential; Future  -     Lipid Panel With LDL / HDL Ratio; Future  -     Hemoglobin A1c; Future    8. Healthcare maintenance  -     Comprehensive Metabolic Panel; Future  -     CBC & Differential; Future  -     Lipid Panel With LDL / HDL Ratio; Future  -     Hemoglobin A1c; Future  -     Mammo Screening Bilateral With CAD; Future  -     DEXA Bone Density Axial; Future  -     Hepatitis C Antibody                 Outpatient Medications Prior to Visit   Medication Sig Dispense Refill   • aspirin 81 MG tablet Take 81 mg by mouth daily.     • CALCIUM CITRATE-VITAMIN D PO      • coenzyme Q10 100 MG capsule Take 100 mg by mouth Daily.     • Cranberry-Vitamin C (AZO CRANBERRY URINARY TRACT PO) Take  by mouth 2 (Two) Times a Day.     • fluticasone (FLONASE) 50 MCG/ACT nasal spray USE 2 SPRAYS IN EACH NOSTRIL ONE TIME DAILY AS DIRECTED 48 g 2   • Ibuprofen 200 MG capsule      • lisinopril (PRINIVIL,ZESTRIL) 20 MG tablet TAKE 1 TABLET EVERY DAY 90 tablet 1   • Magnesium 500 MG capsule Take  by mouth.     • metoprolol succinate XL (TOPROL-XL) 25 MG 24 hr tablet TAKE 1 TABLET EVERY DAY 90 tablet 3   • rosuvastatin (CRESTOR) 5  MG tablet Take 1 tablet by mouth Daily. 90 tablet 3   • vitamin B-12 (CYANOCOBALAMIN) 1000 MCG tablet Take 1,000 mcg by mouth Daily.       No facility-administered medications prior to visit.     No orders of the defined types were placed in this encounter.    [unfilled]  There are no discontinued medications.      Return for bp check and labs this week. .    Patient was given instructions and counseling regarding her condition or for health maintenance advice. Please see specific information pulled into the AVS if appropriate.

## 2022-08-08 NOTE — PROGRESS NOTES
The ABCs of the Annual Wellness Visit  Subsequent Medicare Wellness Visit    Chief Complaint   Patient presents with   • Medicare Wellness-subsequent      Subjective    History of Present Illness:  Adali Hankins is a 76 y.o. female who presents for a Subsequent Medicare Wellness Visit.    The following portions of the patient's history were reviewed and   updated as appropriate: allergies, current medications, past family history, past medical history, past social history, past surgical history and problem list.    Compared to one year ago, the patient feels her physical   health is the same.    Compared to one year ago, the patient feels her mental   health is the same.    Recent Hospitalizations:  She was not admitted to the hospital during the last year.       Current Medical Providers:  Patient Care Team:  Ana Luisa Diaz MD as PCP - General  León Duggan MD as Consulting Physician (Urology)    Outpatient Medications Prior to Visit   Medication Sig Dispense Refill   • aspirin 81 MG tablet Take 81 mg by mouth daily.     • CALCIUM CITRATE-VITAMIN D PO      • coenzyme Q10 100 MG capsule Take 100 mg by mouth Daily.     • Cranberry-Vitamin C (AZO CRANBERRY URINARY TRACT PO) Take  by mouth 2 (Two) Times a Day.     • fluticasone (FLONASE) 50 MCG/ACT nasal spray USE 2 SPRAYS IN EACH NOSTRIL ONE TIME DAILY AS DIRECTED 48 g 2   • Ibuprofen 200 MG capsule      • lisinopril (PRINIVIL,ZESTRIL) 20 MG tablet TAKE 1 TABLET EVERY DAY 90 tablet 1   • Magnesium 500 MG capsule Take  by mouth.     • metoprolol succinate XL (TOPROL-XL) 25 MG 24 hr tablet TAKE 1 TABLET EVERY DAY 90 tablet 3   • rosuvastatin (CRESTOR) 5 MG tablet Take 1 tablet by mouth Daily. 90 tablet 3   • vitamin B-12 (CYANOCOBALAMIN) 1000 MCG tablet Take 1,000 mcg by mouth Daily.       No facility-administered medications prior to visit.       No opioid medication identified on active medication list. I have reviewed chart for other potential  high  "risk medication/s and harmful drug interactions in the elderly.          Aspirin is on active medication list. Aspirin use is not indicated based on review of current medical condition/s. Risk of harm outweighs potential benefits. Patient instructed to discontinue this medication.  .      Patient Active Problem List   Diagnosis   • Hyperlipidemia   • Hyperglycemia   • Hypertension   • Urinary retention   • Urethral cyst   • HTN, white coat   • Menopausal vaginal dryness   • Osteopenia of left hip   • Hearing loss due to cerumen impaction, bilateral   • Injury of nail bed of toe   • Acute URI   • Observed seizure-like activity (HCC)   • Memory impairment     Advance Care Planning  Advance Directive is not on file.  ACP discussion was held with the patient during this visit. Patient has an advance directive (not in EMR), copy requested.    Review of Systems   Constitutional: Negative.    HENT: Negative.    Eyes: Negative.    Respiratory: Negative.    Cardiovascular: Negative.    Gastrointestinal: Negative.    Endocrine: Negative.    Musculoskeletal: Negative.    Skin: Negative.    Allergic/Immunologic: Negative.    Neurological: Negative.    Hematological: Negative.    Psychiatric/Behavioral: Negative.    All other systems reviewed and are negative.       Objective    Vitals:    08/08/22 1455   BP: (!) 158/102   BP Location: Left arm   Patient Position: Sitting   Cuff Size: Adult   Pulse: 84   Resp: 18   Temp: 97.1 °F (36.2 °C)   TempSrc: Tympanic   SpO2: 99%   Weight: 61.6 kg (135 lb 12.8 oz)   Height: 152.4 cm (60\")     Estimated body mass index is 26.52 kg/m² as calculated from the following:    Height as of this encounter: 152.4 cm (60\").    Weight as of this encounter: 61.6 kg (135 lb 12.8 oz).    BMI is >= 25 and <30. (Overweight) The following options were offered after discussion;: exercise counseling/recommendations and nutrition counseling/recommendations      Does the patient have evidence of cognitive " impairment? Yes    Physical Exam  Vitals and nursing note reviewed.   Constitutional:       General: She is not in acute distress.     Appearance: She is well-developed.   HENT:      Head: Normocephalic and atraumatic.      Right Ear: External ear normal.      Left Ear: External ear normal.      Nose: Nose normal.   Eyes:      Conjunctiva/sclera: Conjunctivae normal.      Pupils: Pupils are equal, round, and reactive to light.   Cardiovascular:      Rate and Rhythm: Normal rate and regular rhythm.      Heart sounds: Normal heart sounds.   Pulmonary:      Effort: Pulmonary effort is normal. No respiratory distress.      Breath sounds: Normal breath sounds. No wheezing.   Musculoskeletal:         General: Normal range of motion.      Cervical back: Normal range of motion and neck supple.      Comments: Normal gait   Skin:     General: Skin is warm and dry.   Neurological:      Mental Status: She is alert and oriented to person, place, and time.   Psychiatric:         Behavior: Behavior normal.         Thought Content: Thought content normal.         Judgment: Judgment normal.                 HEALTH RISK ASSESSMENT    Smoking Status:  Social History     Tobacco Use   Smoking Status Former Smoker   • Packs/day: 0.50   • Years: 5.00   • Pack years: 2.50   • Quit date:    • Years since quittin.6   Smokeless Tobacco Never Used     Alcohol Consumption:  Social History     Substance and Sexual Activity   Alcohol Use No     Fall Risk Screen:    STEADI Fall Risk Assessment was completed, and patient is at LOW risk for falls.Assessment completed on:2022    Depression Screening:  PHQ-2/PHQ-9 Depression Screening 2022   Retired Total Score -   Little Interest or Pleasure in Doing Things 0-->not at all   Feeling Down, Depressed or Hopeless 0-->not at all   PHQ-9: Brief Depression Severity Measure Score 0       Health Habits and Functional and Cognitive Screening:  Functional & Cognitive Status 2022   Do you  have difficulty preparing food and eating? No   Do you have difficulty bathing yourself, getting dressed or grooming yourself? No   Do you have difficulty using the toilet? No   Do you have difficulty moving around from place to place? No   Do you have trouble with steps or getting out of a bed or a chair? Yes   Current Diet Well Balanced Diet   Dental Exam Up to date   Eye Exam Up to date        Eye Exam Comment -   Exercise (times per week) 0 times per week   Current Exercises Include No Regular Exercise   Current Exercise Activities Include -   Do you need help using the phone?  No   Are you deaf or do you have serious difficulty hearing?  Yes   Do you need help with transportation? No   Do you need help shopping? Yes   Do you need help preparing meals?  No   Do you need help with housework?  No   Do you need help with laundry? No   Do you need help taking your medications? No   Do you need help managing money? No   Do you ever drive or ride in a car without wearing a seat belt? No   Have you felt unusual stress, anger or loneliness in the last month? No   Who do you live with? Spouse   If you need help, do you have trouble finding someone available to you? No   Have you been bothered in the last four weeks by sexual problems? No   Do you have difficulty concentrating, remembering or making decisions? Yes       Age-appropriate Screening Schedule:  Refer to the list below for future screening recommendations based on patient's age, sex and/or medical conditions. Orders for these recommended tests are listed in the plan section. The patient has been provided with a written plan.    Health Maintenance   Topic Date Due   • DXA SCAN  02/18/2022   • MAMMOGRAM  03/15/2022   • ZOSTER VACCINE (2 of 2) 08/08/2022 (Originally 2/7/2018)   • INFLUENZA VACCINE  10/01/2022   • LIPID PANEL  01/28/2023   • TDAP/TD VACCINES (2 - Td or Tdap) 08/07/2025              Assessment & Plan   CMS Preventative Services Quick Reference  Risk  Factors Identified During Encounter  Immunizations Discussed/Encouraged (specific Immunizations; Prevnar 20 (Pneumococcal 20-valent conjugate), Shingrix, COVID19 and pt declines vaccines  The above risks/problems have been discussed with the patient.  Follow up actions/plans if indicated are seen below in the Assessment/Plan Section.  Pertinent information has been shared with the patient in the After Visit Summary.    Diagnoses and all orders for this visit:    1. Medicare annual wellness visit, subsequent (Primary)  -     Comprehensive Metabolic Panel; Future  -     CBC & Differential; Future  -     Lipid Panel With LDL / HDL Ratio; Future  -     Hemoglobin A1c; Future  -     Mammo Screening Bilateral With CAD; Future  -     DEXA Bone Density Axial; Future  -     Hepatitis C Antibody    2. Breast cancer screening by mammogram  -     Mammo Screening Bilateral With CAD; Future    3. Post-menopausal  -     DEXA Bone Density Axial; Future    4. Essential hypertension  -     Comprehensive Metabolic Panel; Future  -     CBC & Differential; Future  -     Lipid Panel With LDL / HDL Ratio; Future    5. Hyperlipidemia, unspecified hyperlipidemia type  -     Comprehensive Metabolic Panel; Future  -     Lipid Panel With LDL / HDL Ratio; Future    6. Memory changes  -     Ambulatory Referral to Neuropsychology    7. Hyperglycemia  -     Comprehensive Metabolic Panel; Future  -     CBC & Differential; Future  -     Lipid Panel With LDL / HDL Ratio; Future  -     Hemoglobin A1c; Future    8. Healthcare maintenance - Discussed preventive health care issues including healthy diet, exercise, cancer screening, immunizations, and preventive care.  Hm tab updated.  Encouraged seat belt use.  No texting while driving.     -     Comprehensive Metabolic Panel; Future  -     CBC & Differential; Future  -     Lipid Panel With LDL / HDL Ratio; Future  -     Hemoglobin A1c; Future  -     Mammo Screening Bilateral With CAD; Future  -     DEXA  Bone Density Axial; Future  -     Hepatitis C Antibody        Follow Up:   Return for bp check and labs this week. .     An After Visit Summary and PPPS were made available to the patient.

## 2022-08-10 ENCOUNTER — OFFICE VISIT (OUTPATIENT)
Dept: INTERNAL MEDICINE | Facility: CLINIC | Age: 77
End: 2022-08-10

## 2022-08-10 VITALS
HEART RATE: 85 BPM | WEIGHT: 134 LBS | SYSTOLIC BLOOD PRESSURE: 180 MMHG | HEIGHT: 60 IN | OXYGEN SATURATION: 99 % | DIASTOLIC BLOOD PRESSURE: 90 MMHG | BODY MASS INDEX: 26.31 KG/M2 | TEMPERATURE: 97.1 F

## 2022-08-10 DIAGNOSIS — M70.62 TROCHANTERIC BURSITIS OF BOTH HIPS: Primary | ICD-10-CM

## 2022-08-10 DIAGNOSIS — I10 ESSENTIAL HYPERTENSION: ICD-10-CM

## 2022-08-10 DIAGNOSIS — M70.61 TROCHANTERIC BURSITIS OF BOTH HIPS: Primary | ICD-10-CM

## 2022-08-10 PROCEDURE — 99214 OFFICE O/P EST MOD 30 MIN: CPT | Performed by: INTERNAL MEDICINE

## 2022-08-10 RX ORDER — LISINOPRIL 20 MG/1
40 TABLET ORAL DAILY
Qty: 90 TABLET | Refills: 1
Start: 2022-08-10 | End: 2022-09-26 | Stop reason: SDUPTHER

## 2022-08-10 NOTE — PROGRESS NOTES
Adali Hankins is a 76 y.o. female, who presents with a chief complaint of   Chief Complaint   Patient presents with   • Hypertension     BP check and labs           HPI   Pt here for bp recheck.  Pt brought bp log and blod pressure 110's to 150's at home.   Today in office bp up to 180/90 and it was 172/80 on recheck.   She says she feels fine but then says she has a headache.      Pt also c/lo back pain.  She says her sciatic nerve is flared up.  It was on her right leg but now both sides of her buttocks hurt.  She points to the lateral sides of her hips when asked where it hurts.         The following portions of the patient's history were reviewed and updated as appropriate: allergies, current medications, past family history, past medical history, past social history, past surgical history and problem list.    Allergies: Patient has no known allergies.    Review of Systems   Constitutional: Negative.    Eyes: Negative.    Respiratory: Negative.    Cardiovascular: Negative.    Gastrointestinal: Negative.    Endocrine: Negative.    Genitourinary: Negative.    Musculoskeletal: Negative.    Skin: Negative.    Allergic/Immunologic: Negative.    Neurological: Positive for headaches.   Hematological: Negative.    Psychiatric/Behavioral: Negative.    All other systems reviewed and are negative.            Wt Readings from Last 3 Encounters:   08/10/22 60.8 kg (134 lb)   08/08/22 61.6 kg (135 lb 12.8 oz)   12/27/21 56.2 kg (124 lb)     Temp Readings from Last 3 Encounters:   08/10/22 97.1 °F (36.2 °C)   08/08/22 97.1 °F (36.2 °C) (Tympanic)   09/20/21 98 °F (36.7 °C)     BP Readings from Last 3 Encounters:   08/10/22 180/90   08/08/22 (!) 158/102   12/27/21 142/78     Pulse Readings from Last 3 Encounters:   08/10/22 85   08/08/22 84   12/27/21 94     Body mass index is 26.17 kg/m².  SpO2 Readings from Last 3 Encounters:   08/10/22 99%   08/08/22 99%   12/27/21 100%          Physical Exam  Vitals and nursing note  reviewed.   Constitutional:       General: She is not in acute distress.     Appearance: She is well-developed.   HENT:      Head: Normocephalic and atraumatic.      Right Ear: External ear normal.      Left Ear: External ear normal.      Nose: Nose normal.   Eyes:      Conjunctiva/sclera: Conjunctivae normal.      Pupils: Pupils are equal, round, and reactive to light.   Cardiovascular:      Rate and Rhythm: Normal rate and regular rhythm.      Heart sounds: Normal heart sounds.   Pulmonary:      Effort: Pulmonary effort is normal. No respiratory distress.      Breath sounds: Normal breath sounds. No wheezing.   Musculoskeletal:         General: Normal range of motion.      Cervical back: Normal range of motion and neck supple.      Comments: Normal gait   Skin:     General: Skin is warm and dry.   Neurological:      Mental Status: She is alert and oriented to person, place, and time.   Psychiatric:         Behavior: Behavior normal.         Thought Content: Thought content normal.         Judgment: Judgment normal.         Results for orders placed or performed in visit on 01/12/22   Comprehensive Metabolic Panel    Specimen: Blood   Result Value Ref Range    Glucose 93 65 - 99 mg/dL    BUN 12 8 - 27 mg/dL    Creatinine 0.85 0.57 - 1.00 mg/dL    eGFR Non African Am 66 >59 mL/min/1.73    eGFR African Am 76 >59 mL/min/1.73    BUN/Creatinine Ratio 14 12 - 28    Sodium 143 134 - 144 mmol/L    Potassium 3.9 3.5 - 5.2 mmol/L    Chloride 103 96 - 106 mmol/L    Total CO2 27 20 - 29 mmol/L    Calcium 9.7 8.7 - 10.3 mg/dL    Total Protein 7.0 6.0 - 8.5 g/dL    Albumin 4.5 3.7 - 4.7 g/dL    Globulin 2.5 1.5 - 4.5 g/dL    A/G Ratio 1.8 1.2 - 2.2    Total Bilirubin 0.7 0.0 - 1.2 mg/dL    Alkaline Phosphatase 52 44 - 121 IU/L    AST (SGOT) 16 0 - 40 IU/L    ALT (SGPT) 8 0 - 32 IU/L   T4, Free    Specimen: Blood   Result Value Ref Range    Free T4 1.50 0.82 - 1.77 ng/dL   TSH    Specimen: Blood   Result Value Ref Range    TSH  0.819 0.450 - 4.500 uIU/mL   Lipid Panel With LDL / HDL Ratio    Specimen: Blood   Result Value Ref Range    Total Cholesterol 192 100 - 199 mg/dL    Triglycerides 98 0 - 149 mg/dL    HDL Cholesterol 70 >39 mg/dL    VLDL Cholesterol Dilshad 17 5 - 40 mg/dL    LDL Chol Calc (NIH) 105 (H) 0 - 99 mg/dL    LDL/HDL RATIO 1.5 0.0 - 3.2 ratio   Hemoglobin A1c    Specimen: Blood   Result Value Ref Range    Hemoglobin A1C 5.6 4.8 - 5.6 %   CBC & Differential    Specimen: Blood   Result Value Ref Range    WBC 2.8 (L) 3.4 - 10.8 x10E3/uL    RBC 4.37 3.77 - 5.28 x10E6/uL    Hemoglobin 13.4 11.1 - 15.9 g/dL    Hematocrit 39.0 34.0 - 46.6 %    MCV 89 79 - 97 fL    MCH 30.7 26.6 - 33.0 pg    MCHC 34.4 31.5 - 35.7 g/dL    RDW 12.6 11.7 - 15.4 %    Platelets 135 (L) 150 - 450 x10E3/uL    Neutrophil Rel % 49 Not Estab. %    Lymphocyte Rel % 38 Not Estab. %    Monocyte Rel % 10 Not Estab. %    Eosinophil Rel % 2 Not Estab. %    Basophil Rel % 1 Not Estab. %    Neutrophils Absolute 1.4 1.4 - 7.0 x10E3/uL    Lymphocytes Absolute 1.0 0.7 - 3.1 x10E3/uL    Monocytes Absolute 0.3 0.1 - 0.9 x10E3/uL    Eosinophils Absolute 0.0 0.0 - 0.4 x10E3/uL    Basophils Absolute 0.0 0.0 - 0.2 x10E3/uL    Immature Granulocyte Rel % 0 Not Estab. %    Immature Grans Absolute 0.0 0.0 - 0.1 x10E3/uL     Result Review :                  Assessment and Plan    Diagnoses and all orders for this visit:    1. Trochanteric bursitis of both hips (Primary)  -     Diclofenac Sodium (VOLTAREN) 1 % gel gel; Apply 4 g topically to the appropriate area as directed 4 (Four) Times a Day.  Dispense: 350 g; Refill: 2    2. Essential hypertension  -     lisinopril (PRINIVIL,ZESTRIL) 20 MG tablet; Take 2 tablets by mouth Daily.  Dispense: 90 tablet; Refill: 1                   Outpatient Medications Prior to Visit   Medication Sig Dispense Refill   • aspirin 81 MG tablet Take 81 mg by mouth daily.     • CALCIUM CITRATE-VITAMIN D PO      • coenzyme Q10 100 MG capsule Take 100 mg  by mouth Daily.     • Cranberry-Vitamin C (AZO CRANBERRY URINARY TRACT PO) Take  by mouth 2 (Two) Times a Day.     • fluticasone (FLONASE) 50 MCG/ACT nasal spray USE 2 SPRAYS IN EACH NOSTRIL ONE TIME DAILY AS DIRECTED 48 g 2   • Ibuprofen 200 MG capsule      • Magnesium 500 MG capsule Take  by mouth.     • metoprolol succinate XL (TOPROL-XL) 25 MG 24 hr tablet TAKE 1 TABLET EVERY DAY 90 tablet 3   • rosuvastatin (CRESTOR) 5 MG tablet Take 1 tablet by mouth Daily. 90 tablet 3   • vitamin B-12 (CYANOCOBALAMIN) 1000 MCG tablet Take 1,000 mcg by mouth Daily.     • lisinopril (PRINIVIL,ZESTRIL) 20 MG tablet TAKE 1 TABLET EVERY DAY 90 tablet 1     No facility-administered medications prior to visit.     New Medications Ordered This Visit   Medications   • lisinopril (PRINIVIL,ZESTRIL) 20 MG tablet     Sig: Take 2 tablets by mouth Daily.     Dispense:  90 tablet     Refill:  1   • Diclofenac Sodium (VOLTAREN) 1 % gel gel     Sig: Apply 4 g topically to the appropriate area as directed 4 (Four) Times a Day.     Dispense:  350 g     Refill:  2     [unfilled]  Medications Discontinued During This Encounter   Medication Reason   • lisinopril (PRINIVIL,ZESTRIL) 20 MG tablet          Return in about 2 weeks (around 8/24/2022) for Recheck.    Patient was given instructions and counseling regarding her condition or for health maintenance advice. Please see specific information pulled into the AVS if appropriate.

## 2022-08-24 DIAGNOSIS — E78.5 HYPERLIPIDEMIA, UNSPECIFIED HYPERLIPIDEMIA TYPE: ICD-10-CM

## 2022-08-25 ENCOUNTER — APPOINTMENT (OUTPATIENT)
Dept: BONE DENSITY | Facility: HOSPITAL | Age: 77
End: 2022-08-25

## 2022-08-25 ENCOUNTER — HOSPITAL ENCOUNTER (OUTPATIENT)
Dept: MAMMOGRAPHY | Facility: HOSPITAL | Age: 77
Discharge: HOME | End: 2022-08-25

## 2022-08-25 DIAGNOSIS — Z78.0 POST-MENOPAUSAL: ICD-10-CM

## 2022-08-25 DIAGNOSIS — Z12.31 BREAST CANCER SCREENING BY MAMMOGRAM: ICD-10-CM

## 2022-08-25 DIAGNOSIS — Z00.00 HEALTHCARE MAINTENANCE: ICD-10-CM

## 2022-08-25 DIAGNOSIS — Z00.00 MEDICARE ANNUAL WELLNESS VISIT, SUBSEQUENT: ICD-10-CM

## 2022-08-25 PROCEDURE — 77067 SCR MAMMO BI INCL CAD: CPT

## 2022-08-25 PROCEDURE — 77080 DXA BONE DENSITY AXIAL: CPT

## 2022-08-25 PROCEDURE — 77063 BREAST TOMOSYNTHESIS BI: CPT

## 2022-08-25 RX ORDER — ROSUVASTATIN CALCIUM 5 MG/1
TABLET, COATED ORAL
Qty: 90 TABLET | Refills: 3 | Status: SHIPPED | OUTPATIENT
Start: 2022-08-25

## 2022-08-25 NOTE — TELEPHONE ENCOUNTER
Rx Refill Note  Requested Prescriptions     Pending Prescriptions Disp Refills    rosuvastatin (CRESTOR) 5 MG tablet [Pharmacy Med Name: ROSUVASTATIN CALCIUM 5 MG Tablet] 90 tablet 3     Sig: TAKE 1 TABLET EVERY DAY      Last office visit with prescribing clinician: 8/10/2022      Next office visit with prescribing clinician: Visit date not found            MARIA VICTORIA VALERIO MA  08/25/22, 07:47 EDT

## 2022-09-01 ENCOUNTER — TELEPHONE (OUTPATIENT)
Dept: INTERNAL MEDICINE | Facility: CLINIC | Age: 77
End: 2022-09-01

## 2022-09-01 NOTE — TELEPHONE ENCOUNTER
Caller: Adali Hankins    Relationship to patient: Self    Best call back number: [phone]    Patient is needing: PLEASE MAIL COPY OF BONE DENSITY TO HOME     [address] [facility] [id number]    PHONE DISCONNECTED BEFORE HUB COULD REVIEW

## 2022-09-26 DIAGNOSIS — I10 ESSENTIAL HYPERTENSION: ICD-10-CM

## 2022-09-26 NOTE — TELEPHONE ENCOUNTER
Caller: QuinnantoniettaAdali turpin    Relationship: Self    Best call back number: [phone]    Requested Prescriptions:   Requested Prescriptions     Pending Prescriptions Disp Refills   • lisinopril (PRINIVIL,ZESTRIL) 20 MG tablet 90 tablet 1     Sig: Take 2 tablets by mouth Daily.        Pharmacy where request should be sent: [facility] - [facility] [address] [facility] - [phone]  - [phone]      Additional details provided by patient: PATIENT STATES SHE HAS ENOUGH MEDICATION FOR THE REST OF THE WEEK, AS OF TODAY, 9/26/22.     Does the patient have less than a 3 day supply:  [] Yes  [x] No    Milagros Bernardo Rep   09/26/22 15:25 EDT

## 2022-09-28 RX ORDER — LISINOPRIL 20 MG/1
40 TABLET ORAL DAILY
Qty: 90 TABLET | Refills: 1 | Status: SHIPPED | OUTPATIENT
Start: 2022-09-28 | End: 2022-12-14 | Stop reason: SDUPTHER

## 2022-09-28 NOTE — TELEPHONE ENCOUNTER
"Rx Refill Note  Requested Prescriptions     Pending Prescriptions Disp Refills   • lisinopril (PRINIVIL,ZESTRIL) 20 MG tablet 90 tablet 1     Sig: Take 2 tablets by mouth Daily.      Last office visit with prescribing clinician: 8/10/2022      Next office visit with prescribing clinician: Visit date not found            Jennyfer Donovan MA  09/28/22, 14:00 EDT     Last script was sent as \"no print\" and patient does have a BP on file.  "

## 2022-12-02 ENCOUNTER — OFFICE VISIT (OUTPATIENT)
Dept: INTERNAL MEDICINE | Facility: CLINIC | Age: 77
End: 2022-12-02

## 2022-12-02 ENCOUNTER — HOSPITAL ENCOUNTER (OUTPATIENT)
Dept: ULTRASOUND IMAGING | Facility: HOSPITAL | Age: 77
Discharge: HOME | End: 2022-12-02
Admitting: INTERNAL MEDICINE

## 2022-12-02 VITALS
DIASTOLIC BLOOD PRESSURE: 90 MMHG | HEIGHT: 60 IN | WEIGHT: 136.8 LBS | BODY MASS INDEX: 26.86 KG/M2 | HEART RATE: 97 BPM | SYSTOLIC BLOOD PRESSURE: 148 MMHG | OXYGEN SATURATION: 99 % | TEMPERATURE: 97.7 F

## 2022-12-02 DIAGNOSIS — M79.89 LEFT LEG SWELLING: Primary | ICD-10-CM

## 2022-12-02 PROCEDURE — 93971 EXTREMITY STUDY: CPT

## 2022-12-02 PROCEDURE — 99213 OFFICE O/P EST LOW 20 MIN: CPT | Performed by: INTERNAL MEDICINE

## 2022-12-02 RX ORDER — MULTIVIT WITH MINERALS/LUTEIN
1000 TABLET ORAL DAILY
COMMUNITY

## 2022-12-02 NOTE — PROGRESS NOTES
Adali Hankins is a 77 y.o. female who presents with a chief complaint of   Chief Complaint   Patient presents with   • Leg Swelling     Left leg red and swollen       HPI     Left leg swollen outer portion to right leg with redness and pain along the posterior calf.    The following portions of the patient's history were reviewed and updated as appropriate: allergies, current medications, past family history, past medical history, past social history, past surgical history and problem list.      Current Outpatient Medications:   •  aspirin 81 MG tablet, Take 81 mg by mouth daily., Disp: , Rfl:   •  CALCIUM CITRATE-VITAMIN D PO, , Disp: , Rfl:   •  coenzyme Q10 100 MG capsule, Take 100 mg by mouth Daily., Disp: , Rfl:   •  Cranberry-Vitamin C (AZO CRANBERRY URINARY TRACT PO), Take  by mouth 2 (Two) Times a Day., Disp: , Rfl:   •  Diclofenac Sodium (VOLTAREN) 1 % gel gel, Apply 4 g topically to the appropriate area as directed 4 (Four) Times a Day. (Patient taking differently: Apply 4 g topically to the appropriate area as directed As Needed.), Disp: 350 g, Rfl: 2  •  fluticasone (FLONASE) 50 MCG/ACT nasal spray, USE 2 SPRAYS IN EACH NOSTRIL ONE TIME DAILY AS DIRECTED, Disp: 48 g, Rfl: 2  •  Ibuprofen 200 MG capsule, As Needed., Disp: , Rfl:   •  lisinopril (PRINIVIL,ZESTRIL) 20 MG tablet, Take 2 tablets by mouth Daily., Disp: 90 tablet, Rfl: 1  •  Magnesium 500 MG capsule, Take  by mouth., Disp: , Rfl:   •  metoprolol succinate XL (TOPROL-XL) 25 MG 24 hr tablet, TAKE 1 TABLET EVERY DAY, Disp: 90 tablet, Rfl: 3  •  rosuvastatin (CRESTOR) 5 MG tablet, TAKE 1 TABLET EVERY DAY, Disp: 90 tablet, Rfl: 3  •  vitamin B-12 (CYANOCOBALAMIN) 1000 MCG tablet, Take 1,000 mcg by mouth Daily., Disp: , Rfl:   •  vitamin E 1000 UNIT capsule, Take 1,000 Units by mouth Daily., Disp: , Rfl:             Physical Exam  /90 (BP Location: Left arm, Patient Position: Sitting, Cuff Size: Adult)   Pulse 97   Temp 97.7 °F (36.5  "°C)   Ht 152.4 cm (60\")   Wt 62.1 kg (136 lb 12.8 oz)   SpO2 99%   BMI 26.72 kg/m²     Physical Exam  Vitals reviewed.   Constitutional:       General: She is not in acute distress.     Appearance: Normal appearance.   HENT:      Head: Normocephalic and atraumatic.      Nose: Nose normal.      Mouth/Throat:      Mouth: Mucous membranes are moist.   Eyes:      Conjunctiva/sclera: Conjunctivae normal.   Cardiovascular:      Rate and Rhythm: Normal rate and regular rhythm.      Pulses: Normal pulses.      Heart sounds: Normal heart sounds.   Pulmonary:      Effort: Pulmonary effort is normal.      Breath sounds: Normal breath sounds.   Abdominal:      Palpations: Abdomen is soft.      Tenderness: There is no abdominal tenderness.   Musculoskeletal:      Right lower leg: No edema.      Left lower leg: No edema.      Comments: Left lower leg swollen and mildly red without increased warmth   Skin:     General: Skin is warm and dry.   Neurological:      General: No focal deficit present.      Mental Status: She is alert.   Psychiatric:         Mood and Affect: Mood normal.           Results for orders placed or performed in visit on 08/13/22   Comprehensive Metabolic Panel    Specimen: Blood   Result Value Ref Range    Glucose 95 65 - 99 mg/dL    BUN 13 8 - 27 mg/dL    Creatinine 0.89 0.57 - 1.00 mg/dL    EGFR Result 67 >59 mL/min/1.73    BUN/Creatinine Ratio 15 12 - 28    Sodium 141 134 - 144 mmol/L    Potassium 4.9 3.5 - 5.2 mmol/L    Chloride 102 96 - 106 mmol/L    Total CO2 28 20 - 29 mmol/L    Calcium 9.8 8.7 - 10.3 mg/dL    Total Protein 7.0 6.0 - 8.5 g/dL    Albumin 4.5 3.7 - 4.7 g/dL    Globulin 2.5 1.5 - 4.5 g/dL    A/G Ratio 1.8 1.2 - 2.2    Total Bilirubin 0.7 0.0 - 1.2 mg/dL    Alkaline Phosphatase 53 44 - 121 IU/L    AST (SGOT) 20 0 - 40 IU/L    ALT (SGPT) 8 0 - 32 IU/L   Lipid Panel With LDL / HDL Ratio    Specimen: Blood   Result Value Ref Range    Total Cholesterol 222 (H) 100 - 199 mg/dL    " Triglycerides 70 0 - 149 mg/dL    HDL Cholesterol 100 >39 mg/dL    VLDL Cholesterol Dilshad 12 5 - 40 mg/dL    LDL Chol Calc (NIH) 110 (H) 0 - 99 mg/dL    LDL/HDL RATIO 1.1 0.0 - 3.2 ratio   Hemoglobin A1c    Specimen: Blood   Result Value Ref Range    Hemoglobin A1C 5.3 4.8 - 5.6 %   CBC & Differential    Specimen: Blood   Result Value Ref Range    WBC 4.2 3.4 - 10.8 x10E3/uL    RBC 4.23 3.77 - 5.28 x10E6/uL    Hemoglobin 13.0 11.1 - 15.9 g/dL    Hematocrit 38.6 34.0 - 46.6 %    MCV 91 79 - 97 fL    MCH 30.7 26.6 - 33.0 pg    MCHC 33.7 31.5 - 35.7 g/dL    RDW 12.7 11.7 - 15.4 %    Platelets 144 (L) 150 - 450 x10E3/uL    Neutrophil Rel % 47 Not Estab. %    Lymphocyte Rel % 41 Not Estab. %    Monocyte Rel % 9 Not Estab. %    Eosinophil Rel % 2 Not Estab. %    Basophil Rel % 1 Not Estab. %    Neutrophils Absolute 2.0 1.4 - 7.0 x10E3/uL    Lymphocytes Absolute 1.7 0.7 - 3.1 x10E3/uL    Monocytes Absolute 0.4 0.1 - 0.9 x10E3/uL    Eosinophils Absolute 0.1 0.0 - 0.4 x10E3/uL    Basophils Absolute 0.0 0.0 - 0.2 x10E3/uL    Immature Granulocyte Rel % 0 Not Estab. %    Immature Grans Absolute 0.0 0.0 - 0.1 x10E3/uL           Diagnoses and all orders for this visit:    1. Left leg swelling (Primary)  Assessment & Plan:  Left leg swollen on proportion to right, no increased warmth or signs of true cellulitis but does have pain along posterior calf.  Has been sitting more recently, but no other obvious risk factors such as family history of clotting disorder or surgery.  I am concerned about letting this go through the weekend.  Office staff was able to arrange for stat Doppler ultrasound and she has been sent downstairs to complete this this evening.    Orders:  -     Cancel: US venous doppler lower extremity left (duplex)  -     US venous doppler lower extremity left (duplex)

## 2022-12-02 NOTE — ASSESSMENT & PLAN NOTE
Left leg swollen on proportion to right, no increased warmth or signs of true cellulitis but does have pain along posterior calf.  Has been sitting more recently, but no other obvious risk factors such as family history of clotting disorder or surgery.  I am concerned about letting this go through the weekend.  Office staff was able to arrange for stat Doppler ultrasound and she has been sent downstairs to complete this this evening.

## 2022-12-07 ENCOUNTER — TELEPHONE (OUTPATIENT)
Dept: INTERNAL MEDICINE | Facility: CLINIC | Age: 77
End: 2022-12-07

## 2022-12-07 NOTE — TELEPHONE ENCOUNTER
The [facility] received a fax that requires your attention. The document has been indexed to the patient’s chart for your review.      Reason for sending: MED REFILL    Documents Description: EXT MED RECS-GIBRAN JOHNSON-12.5.22    Name of Sender: [facility]    Date Indexed: 12.7.22    Notes (if needed):

## 2022-12-07 NOTE — TELEPHONE ENCOUNTER
The [facility] received a fax that requires your attention. The document has been indexed to the patient’s chart for your review.      Reason for sending: MED REFILL    Documents Description: EXT MONE JOHNSON-12.5.22    Name of Sender: [facility]    Date Indexed:12.7.22    Notes (if needed): THERE IS ANOTHER MEDICATION REFILL INCLUDING THIS ONE. INDEXED SEPARATELY.

## 2022-12-09 ENCOUNTER — TELEPHONE (OUTPATIENT)
Dept: INTERNAL MEDICINE | Facility: CLINIC | Age: 77
End: 2022-12-09

## 2022-12-09 NOTE — TELEPHONE ENCOUNTER
The [facility] received a fax that requires your attention. The document has been indexed to the patient’s chart for your review.      Reason for sending: REFILL REQUEST    Documents Description: EXT MED [id number]-04.7.22    Name of Sender: [facility]    Date Indexed: 12.9.22

## 2022-12-14 DIAGNOSIS — I10 ESSENTIAL HYPERTENSION: ICD-10-CM

## 2022-12-14 NOTE — TELEPHONE ENCOUNTER
Caller: [facility] - [place], [place] - [address] - [phone] [facility] [phone] FX    Relationship: Pharmacy    Requested Prescriptions:   Requested Prescriptions     Pending Prescriptions Disp Refills   • lisinopril (PRINIVIL,ZESTRIL) 20 MG tablet 90 tablet 1     Sig: Take 2 tablets by mouth Daily.   • metoprolol succinate XL (TOPROL-XL) 25 MG 24 hr tablet 90 tablet 3     Sig: Take 1 tablet by mouth Daily.        Pharmacy where request should be sent: [facility] - [place], [place] - [address] [facility] - [phone] [facility] [phone] FX     Additional details provided by patient: PATIENT IS OUT OF THIS MEDICATION    Does the patient have less than a 3 day supply:  [x] Yes  [] No      Milagros Thomas Rep   12/14/22 08:57 EST

## 2022-12-14 NOTE — TELEPHONE ENCOUNTER
Rx Refill Note  Requested Prescriptions     Pending Prescriptions Disp Refills    lisinopril (PRINIVIL,ZESTRIL) 20 MG tablet 90 tablet 1     Sig: Take 2 tablets by mouth Daily.    metoprolol succinate XL (TOPROL-XL) 25 MG 24 hr tablet 90 tablet 3     Sig: Take 1 tablet by mouth Daily.      Last office visit with prescribing clinician: 8/10/2022   Last telemedicine visit with prescribing clinician: Visit date not found   Next office visit with prescribing clinician: Visit date not found                         Would you like a call back once the refill request has been completed: [] Yes [] No    If the office needs to give you a call back, can they leave a voicemail: [] Yes [] No    Meg Joshua MA  12/14/22, 09:08 EST

## 2022-12-15 RX ORDER — METOPROLOL SUCCINATE 25 MG/1
25 TABLET, EXTENDED RELEASE ORAL DAILY
Qty: 90 TABLET | Refills: 3 | Status: SHIPPED | OUTPATIENT
Start: 2022-12-15

## 2022-12-15 RX ORDER — LISINOPRIL 20 MG/1
40 TABLET ORAL DAILY
Qty: 90 TABLET | Refills: 1 | Status: SHIPPED | OUTPATIENT
Start: 2022-12-15 | End: 2023-04-03

## 2023-02-15 ENCOUNTER — HOSPITAL ENCOUNTER (EMERGENCY)
Facility: HOSPITAL | Age: 78
Discharge: HOME | End: 2023-02-15
Attending: EMERGENCY MEDICINE | Admitting: EMERGENCY MEDICINE
Payer: MEDICARE

## 2023-02-15 ENCOUNTER — APPOINTMENT (OUTPATIENT)
Dept: GENERAL RADIOLOGY | Facility: HOSPITAL | Age: 78
End: 2023-02-15
Payer: MEDICARE

## 2023-02-15 VITALS
BODY MASS INDEX: 21.34 KG/M2 | TEMPERATURE: 100.4 F | DIASTOLIC BLOOD PRESSURE: 83 MMHG | RESPIRATION RATE: 16 BRPM | HEIGHT: 64 IN | HEART RATE: 90 BPM | SYSTOLIC BLOOD PRESSURE: 180 MMHG | OXYGEN SATURATION: 95 % | WEIGHT: 125 LBS

## 2023-02-15 DIAGNOSIS — E86.0 DEHYDRATION: ICD-10-CM

## 2023-02-15 DIAGNOSIS — U07.1 COVID-19: Primary | ICD-10-CM

## 2023-02-15 LAB
ALBUMIN SERPL-MCNC: 4.4 G/DL (ref 3.5–5.2)
ALBUMIN/GLOB SERPL: 1.6 G/DL
ALP SERPL-CCNC: 55 U/L (ref 39–117)
ALT SERPL W P-5'-P-CCNC: 8 U/L (ref 1–33)
ANION GAP SERPL CALCULATED.3IONS-SCNC: 10.8 MMOL/L (ref 5–15)
AST SERPL-CCNC: 16 U/L (ref 1–32)
BACTERIA UR QL AUTO: ABNORMAL /HPF
BASOPHILS # BLD AUTO: 0.02 10*3/MM3 (ref 0–0.2)
BASOPHILS NFR BLD AUTO: 0.3 % (ref 0–1.5)
BILIRUB SERPL-MCNC: 0.7 MG/DL (ref 0–1.2)
BILIRUB UR QL STRIP: NEGATIVE
BUN SERPL-MCNC: 8 MG/DL (ref 8–23)
BUN/CREAT SERPL: 10 (ref 7–25)
CALCIUM SPEC-SCNC: 9.2 MG/DL (ref 8.6–10.5)
CHLORIDE SERPL-SCNC: 101 MMOL/L (ref 98–107)
CLARITY UR: CLEAR
CO2 SERPL-SCNC: 25.2 MMOL/L (ref 22–29)
COLOR UR: YELLOW
CREAT SERPL-MCNC: 0.8 MG/DL (ref 0.57–1)
DEPRECATED RDW RBC AUTO: 41.5 FL (ref 37–54)
EGFRCR SERPLBLD CKD-EPI 2021: 75.5 ML/MIN/1.73
EOSINOPHIL # BLD AUTO: 0 10*3/MM3 (ref 0–0.4)
EOSINOPHIL NFR BLD AUTO: 0 % (ref 0.3–6.2)
ERYTHROCYTE [DISTWIDTH] IN BLOOD BY AUTOMATED COUNT: 12.5 % (ref 12.3–15.4)
FLUAV RNA RESP QL NAA+PROBE: NOT DETECTED
FLUBV RNA RESP QL NAA+PROBE: NOT DETECTED
GLOBULIN UR ELPH-MCNC: 2.7 GM/DL
GLUCOSE SERPL-MCNC: 114 MG/DL (ref 65–99)
GLUCOSE UR STRIP-MCNC: NEGATIVE MG/DL
HCT VFR BLD AUTO: 39.8 % (ref 34–46.6)
HGB BLD-MCNC: 13.5 G/DL (ref 12–15.9)
HGB UR QL STRIP.AUTO: ABNORMAL
HOLD SPECIMEN: NORMAL
HOLD SPECIMEN: NORMAL
HYALINE CASTS UR QL AUTO: ABNORMAL /LPF
IMM GRANULOCYTES # BLD AUTO: 0.01 10*3/MM3 (ref 0–0.05)
IMM GRANULOCYTES NFR BLD AUTO: 0.2 % (ref 0–0.5)
KETONES UR QL STRIP: ABNORMAL
LEUKOCYTE ESTERASE UR QL STRIP.AUTO: NEGATIVE
LYMPHOCYTES # BLD AUTO: 0.73 10*3/MM3 (ref 0.7–3.1)
LYMPHOCYTES NFR BLD AUTO: 11.3 % (ref 19.6–45.3)
MCH RBC QN AUTO: 31 PG (ref 26.6–33)
MCHC RBC AUTO-ENTMCNC: 33.9 G/DL (ref 31.5–35.7)
MCV RBC AUTO: 91.5 FL (ref 79–97)
MONOCYTES # BLD AUTO: 0.7 10*3/MM3 (ref 0.1–0.9)
MONOCYTES NFR BLD AUTO: 10.9 % (ref 5–12)
NEUTROPHILS NFR BLD AUTO: 4.99 10*3/MM3 (ref 1.7–7)
NEUTROPHILS NFR BLD AUTO: 77.3 % (ref 42.7–76)
NITRITE UR QL STRIP: NEGATIVE
NRBC BLD AUTO-RTO: 0 /100 WBC (ref 0–0.2)
PH UR STRIP.AUTO: 6.5 [PH] (ref 4.5–8)
PLATELET # BLD AUTO: 126 10*3/MM3 (ref 140–450)
PMV BLD AUTO: 10.6 FL (ref 6–12)
POTASSIUM SERPL-SCNC: 3.9 MMOL/L (ref 3.5–5.2)
PROT SERPL-MCNC: 7.1 G/DL (ref 6–8.5)
PROT UR QL STRIP: ABNORMAL
RBC # BLD AUTO: 4.35 10*6/MM3 (ref 3.77–5.28)
RBC # UR STRIP: ABNORMAL /HPF
REF LAB TEST METHOD: ABNORMAL
SARS-COV-2 RNA RESP QL NAA+PROBE: DETECTED
SODIUM SERPL-SCNC: 137 MMOL/L (ref 136–145)
SP GR UR STRIP: 1.02 (ref 1–1.03)
SQUAMOUS #/AREA URNS HPF: ABNORMAL /HPF
UROBILINOGEN UR QL STRIP: ABNORMAL
WBC # UR STRIP: ABNORMAL /HPF
WBC NRBC COR # BLD: 6.45 10*3/MM3 (ref 3.4–10.8)
WHOLE BLOOD HOLD COAG: NORMAL
WHOLE BLOOD HOLD SPECIMEN: NORMAL

## 2023-02-15 PROCEDURE — 85025 COMPLETE CBC W/AUTO DIFF WBC: CPT | Performed by: NURSE PRACTITIONER

## 2023-02-15 PROCEDURE — 99284 EMERGENCY DEPT VISIT MOD MDM: CPT

## 2023-02-15 PROCEDURE — 87636 SARSCOV2 & INF A&B AMP PRB: CPT | Performed by: NURSE PRACTITIONER

## 2023-02-15 PROCEDURE — 71045 X-RAY EXAM CHEST 1 VIEW: CPT

## 2023-02-15 PROCEDURE — P9612 CATHETERIZE FOR URINE SPEC: HCPCS

## 2023-02-15 PROCEDURE — 80053 COMPREHEN METABOLIC PANEL: CPT | Performed by: NURSE PRACTITIONER

## 2023-02-15 PROCEDURE — 81001 URINALYSIS AUTO W/SCOPE: CPT | Performed by: NURSE PRACTITIONER

## 2023-02-15 PROCEDURE — 36415 COLL VENOUS BLD VENIPUNCTURE: CPT

## 2023-02-15 RX ORDER — SODIUM CHLORIDE 0.9 % (FLUSH) 0.9 %
10 SYRINGE (ML) INJECTION AS NEEDED
Status: DISCONTINUED | OUTPATIENT
Start: 2023-02-15 | End: 2023-02-15 | Stop reason: HOSPADM

## 2023-02-15 RX ADMIN — SODIUM CHLORIDE 500 ML: 9 INJECTION, SOLUTION INTRAVENOUS at 19:00

## 2023-02-15 NOTE — ED PROVIDER NOTES
EMERGENCY DEPARTMENT ENCOUNTER      Room Number: 06/06    History is provided by the patient, EMS and daughter, no translation services needed    HPI:    Chief complaint: 2 days of increasing confusion, fever, rhinorrhea, lethargy, increased urination    Location: Generalized    Quality/Severity: Mild increase in confusion from baseline    Timing/Duration: 2 days    Modifying Factors: Family cannot identify any modifying factors.    Associated Symptoms:  is ill with upper respiratory symptoms.    Narrative: Pt is a 77 y.o. female who presents via EMS complaining of approximately 2 days of increasing confusion from baseline, rhinorrhea, fever, lethargy, increased urination.  Daughter states that yesterday patient was at [facility] and became confused and was unsure how to leave the store.  She states patient left the store and sat on the curb.   got patient in the car took her home where she was found to have urinated and defecated on herself.  Family states that she has been acting more confused than her baseline since this time.      PMD: Ana Luisa Diaz MD    REVIEW OF SYSTEMS  Review of Systems   Constitutional: Positive for chills, fatigue and fever. Negative for activity change, appetite change, diaphoresis and unexpected weight change.   HENT: Positive for congestion and rhinorrhea. Negative for facial swelling, postnasal drip, sinus pressure, sinus pain, sneezing and sore throat.    Eyes: Negative for itching and visual disturbance.   Respiratory: Negative for cough, chest tightness and shortness of breath.    Cardiovascular: Negative for chest pain, palpitations and leg swelling.   Gastrointestinal: Negative for diarrhea, nausea and vomiting.   Genitourinary: Positive for frequency.   Musculoskeletal: Negative for arthralgias, myalgias, neck pain and neck stiffness.   Skin: Negative for pallor and rash.   Allergic/Immunologic: Negative.    Neurological: Negative for dizziness, weakness,  light-headedness and headaches.   Hematological: Negative.    Psychiatric/Behavioral: Positive for confusion. Negative for agitation and hallucinations. The patient is not hyperactive.          PAST MEDICAL HISTORY  Active Ambulatory Problems     Diagnosis Date Noted   • Hyperlipidemia 2016   • Hyperglycemia 2016   • Hypertension 2016   • Urinary retention 2017   • Urethral cyst 2017   • HTN, white coat 2017   • Menopausal vaginal dryness 2017   • Osteopenia of left hip 2018   • Hearing loss due to cerumen impaction, bilateral 2018   • Injury of nail bed of toe 2018   • Acute URI 2018   • Observed seizure-like activity (HCC) 10/18/2021   • Memory impairment 2021   • Left leg swelling 2022     Resolved Ambulatory Problems     Diagnosis Date Noted   • No Resolved Ambulatory Problems     Past Medical History:   Diagnosis Date   • Arthritis    • Cataracts, bilateral    • Environmental allergies    • History of carcinoma in situ of cervix uteri    • Hypoglycemia    • Memory loss    • TIA (transient ischemic attack)    • Vaginal prolapse        PAST SURGICAL HISTORY  Past Surgical History:   Procedure Laterality Date   • BREAST BIOPSY Left     abcess drained surgically   • HERNIA REPAIR     • TUBAL ABDOMINAL LIGATION         FAMILY HISTORY  Family History   Problem Relation Age of Onset   • Cancer Other         colon   • Hypertension Other    • Stroke Brother    • Breast cancer Neg Hx        SOCIAL HISTORY  Social History     Socioeconomic History   • Marital status:    Tobacco Use   • Smoking status: Former     Packs/day: 0.50     Years: 5.00     Pack years: 2.50     Types: Cigarettes     Quit date:      Years since quittin.1   • Smokeless tobacco: Never   Substance and Sexual Activity   • Alcohol use: No   • Drug use: No   • Sexual activity: Defer       ALLERGIES  Patient has no known allergies.      Current  Facility-Administered Medications:   •  sodium chloride 0.9 % bolus 500 mL, 500 mL, Intravenous, Once, Francisca Alberts, TALIB, Last Rate: 1,000 mL/hr at 02/15/23 1900, 500 mL at 02/15/23 1900  •  sodium chloride 0.9 % flush 10 mL, 10 mL, Intravenous, PRN, Francisca Alberts, APRN    Current Outpatient Medications:   •  aspirin 81 MG tablet, Take 81 mg by mouth daily., Disp: , Rfl:   •  CALCIUM CITRATE-VITAMIN D PO, , Disp: , Rfl:   •  coenzyme Q10 100 MG capsule, Take 100 mg by mouth Daily., Disp: , Rfl:   •  Cranberry-Vitamin C (AZO CRANBERRY URINARY TRACT PO), Take  by mouth 2 (Two) Times a Day., Disp: , Rfl:   •  Diclofenac Sodium (VOLTAREN) 1 % gel gel, Apply 4 g topically to the appropriate area as directed 4 (Four) Times a Day. (Patient taking differently: Apply 4 g topically to the appropriate area as directed As Needed.), Disp: 350 g, Rfl: 2  •  fluticasone (FLONASE) 50 MCG/ACT nasal spray, USE 2 SPRAYS IN EACH NOSTRIL ONE TIME DAILY AS DIRECTED, Disp: 48 g, Rfl: 2  •  Ibuprofen 200 MG capsule, As Needed., Disp: , Rfl:   •  lisinopril (PRINIVIL,ZESTRIL) 20 MG tablet, Take 2 tablets by mouth Daily., Disp: 90 tablet, Rfl: 1  •  Magnesium 500 MG capsule, Take  by mouth., Disp: , Rfl:   •  metoprolol succinate XL (TOPROL-XL) 25 MG 24 hr tablet, Take 1 tablet by mouth Daily., Disp: 90 tablet, Rfl: 3  •  rosuvastatin (CRESTOR) 5 MG tablet, TAKE 1 TABLET EVERY DAY, Disp: 90 tablet, Rfl: 3  •  vitamin B-12 (CYANOCOBALAMIN) 1000 MCG tablet, Take 1,000 mcg by mouth Daily., Disp: , Rfl:   •  vitamin E 1000 UNIT capsule, Take 1,000 Units by mouth Daily., Disp: , Rfl:     PHYSICAL EXAM  ED Triage Vitals   Temp Heart Rate Resp BP SpO2   02/15/23 1659 02/15/23 1659 02/15/23 1659 02/15/23 1709 02/15/23 1659   100.4 °F (38 °C) 114 22 (!) 201/92 98 %      Temp src Heart Rate Source Patient Position BP Location FiO2 (%)   02/15/23 1659 02/15/23 1659 -- -- --   Oral Monitor          Physical Exam  Vitals and nursing note  reviewed.   Constitutional:       General: She is not in acute distress.     Appearance: Normal appearance. She is normal weight. She is not ill-appearing, toxic-appearing or diaphoretic.   HENT:      Head: Normocephalic and atraumatic.      Right Ear: Tympanic membrane, ear canal and external ear normal. There is no impacted cerumen.      Left Ear: Tympanic membrane, ear canal and external ear normal. There is no impacted cerumen.      Nose: Rhinorrhea present. No congestion.      Mouth/Throat:      Pharynx: Oropharynx is clear. No oropharyngeal exudate or posterior oropharyngeal erythema.   Eyes:      General: No visual field deficit.     Extraocular Movements: Extraocular movements intact.      Conjunctiva/sclera: Conjunctivae normal.   Cardiovascular:      Rate and Rhythm: Tachycardia present.      Pulses: Normal pulses.   Pulmonary:      Effort: Pulmonary effort is normal. No respiratory distress.      Breath sounds: Normal breath sounds. No wheezing or rhonchi.   Abdominal:      General: Bowel sounds are normal. There is no distension.      Tenderness: There is no abdominal tenderness. There is no right CVA tenderness, left CVA tenderness or guarding.   Musculoskeletal:         General: No swelling. Normal range of motion.      Cervical back: Normal range of motion. No rigidity.      Right lower leg: No edema.      Left lower leg: No edema.   Lymphadenopathy:      Cervical: No cervical adenopathy.   Skin:     General: Skin is warm and dry.      Capillary Refill: Capillary refill takes less than 2 seconds.      Coloration: Skin is not jaundiced or pale.      Findings: No bruising or erythema.   Neurological:      General: No focal deficit present.      Mental Status: She is alert.      Cranial Nerves: No cranial nerve deficit, dysarthria or facial asymmetry.      Sensory: Sensation is intact.      Motor: Motor function is intact. No weakness, tremor, seizure activity or pronator drift.      Gait: Gait is  intact.      Comments: Patient can tell me date, month, where she is.  She is oriented to location, time, place.  However, when asked about the days events she has forgotten that she had an episode of soiling herself this morning.           LAB RESULTS  Lab Results (last 24 hours)     Procedure Component Value Units Date/Time    CBC & Differential [[id number]]  (Abnormal) Collected: 02/15/23 1721    Specimen: Blood Updated: 02/15/23 1805    Narrative:      The following orders were created for panel order CBC & Differential.  Procedure                               Abnormality         Status                     ---------                               -----------         ------                     CBC Auto Differential[[id number]]        Abnormal            Final result               Scan Slide[[id number]]                                                                    Please view results for these tests on the individual orders.    Comprehensive Metabolic Panel [[id number]]  (Abnormal) Collected: 02/15/23 1721    Specimen: Blood Updated: 02/15/23 1815     Glucose 114 mg/dL      BUN 8 mg/dL      Creatinine 0.80 mg/dL      Sodium 137 mmol/L      Potassium 3.9 mmol/L      Chloride 101 mmol/L      CO2 25.2 mmol/L      Calcium 9.2 mg/dL      Total Protein 7.1 g/dL      Albumin 4.4 g/dL      ALT (SGPT) 8 U/L      AST (SGOT) 16 U/L      Alkaline Phosphatase 55 U/L      Total Bilirubin 0.7 mg/dL      Globulin 2.7 gm/dL      A/G Ratio 1.6 g/dL      BUN/Creatinine Ratio 10.0     Anion Gap 10.8 mmol/L      eGFR 75.5 mL/min/1.73     Narrative:      GFR Normal >60  Chronic Kidney Disease <60  Kidney Failure <15    The GFR formula is only valid for adults with stable renal function between ages 18 and 70.    COVID-19 and FLU A/B PCR - Swab, Nasopharynx [[id number]]  (Abnormal) Collected: 02/15/23 1721    Specimen: Swab from Nasopharynx Updated: 02/15/23 1823     COVID19 Detected     Influenza A PCR Not Detected     Influenza B PCR  Not Detected    Narrative:      Fact sheet for providers: https://www.fda.gov/media/959696/download    Fact sheet for patients: https://www.fda.gov/media/456258/download    Test performed by PCR.  Influenza A and Influenza B negative results should be considered presumptive in samples that have a positive SARS-CoV-2 result.    Competitive inhibition studies showed that SARS-CoV-2 virus, when present at concentrations above 3.6E+04 copies/mL, can inhibit the detection and amplification of influenza A and influenza B virus RNA if present at or below 1.8E+02 copies/mL or 4.9E+02 copies/mL, respectively, and may lead to false negative influenza virus results. If co-infection with influenza A or influenza B virus is suspected in samples with a positive SARS-CoV-2 result, the sample should be re-tested with another FDA cleared, approved, or authorized influenza test, if influenza virus detection would change clinical management.    CBC Auto Differential [[id number]]  (Abnormal) Collected: 02/15/23 1721    Specimen: Blood Updated: 02/15/23 1805     WBC 6.45 10*3/mm3      RBC 4.35 10*6/mm3      Hemoglobin 13.5 g/dL      Hematocrit 39.8 %      MCV 91.5 fL      MCH 31.0 pg      MCHC 33.9 g/dL      RDW 12.5 %      RDW-SD 41.5 fl      MPV 10.6 fL      Platelets 126 10*3/mm3      Neutrophil % 77.3 %      Lymphocyte % 11.3 %      Monocyte % 10.9 %      Eosinophil % 0.0 %      Basophil % 0.3 %      Immature Grans % 0.2 %      Neutrophils, Absolute 4.99 10*3/mm3      Lymphocytes, Absolute 0.73 10*3/mm3      Monocytes, Absolute 0.70 10*3/mm3      Eosinophils, Absolute 0.00 10*3/mm3      Basophils, Absolute 0.02 10*3/mm3      Immature Grans, Absolute 0.01 10*3/mm3      nRBC 0.0 /100 WBC     Urinalysis With Microscopic If Indicated (No Culture) - Urine, Catheter [[id number]]  (Abnormal) Collected: 02/15/23 1731    Specimen: Urine, Catheter Updated: 02/15/23 1808     Color, UA Yellow     Appearance, UA Clear     pH, UA 6.5     Specific  Gravity, UA 1.025     Glucose, UA Negative     Ketones, UA 80 mg/dL (3+)     Bilirubin, UA Negative     Blood, UA Large (3+)     Protein,  mg/dL (2+)     Leuk Esterase, UA Negative     Nitrite, UA Negative     Urobilinogen, UA 0.2 E.U./dL    Urinalysis, Microscopic Only - Urine, Catheter [[id number]]  (Abnormal) Collected: 02/15/23 1731    Specimen: Urine, Catheter Updated: 02/15/23 1837     RBC, UA 21-30 /HPF      WBC, UA 0-2 /HPF      Bacteria, UA Trace /HPF      Squamous Epithelial Cells, UA None Seen /HPF      Hyaline Casts, UA None Seen /LPF      Methodology Manual Light Microscopy            I ordered the above labs and reviewed the results    RADIOLOGY  XR Chest 1 View    Result Date: 2/15/2023  CR Chest 1 Vw INDICATION: Altered mental status protocol COMPARISON:  None available. FINDINGS: Portable AP view(s) of the chest.  There is mild cardiac silhouette enlargement. This is not changed. There is chronic coarsening of interstitial markings also without acute infiltrate. No pleural effusion or pneumothorax. No acute congestive heart failure.     1. No change in the appearance the chest. Mild chronic cardiac silhouette enlargement and coarsening of interstitial markings. Signer Name: Yaritza Bravo MD  Signed: 2/15/2023 6:13 PM  Workstation Name: [email or web address]  [facility]      I ordered the above radiologic testing and reviewed the results    PROCEDURES  Procedures      PROGRESS AND CONSULTS  ED Course as of 02/15/23 1916   Wed Feb 15, 2023   1810 Patient has a history of below normal platelets.  Hemoglobin, hematocrit, white blood cells are normal. [VT]   1810 Patient has ketones, blood, protein in her urine. [VT]   1833 Flu is negative.  Patient is positive for COVID. [VT]   1834    IMPRESSION:     1. No change in the appearance the chest. Mild chronic cardiac silhouette enlargement and coarsening of interstitial markings.     Signer Name: Yaritza Bravo MD [VT]   1837 Urine shows  trace bacteria with a small amount of white blood cells.  She is nitrate negative.  She is leukocyte negative.  She has no urinary symptoms such as dysuria, flank pain. [VT]   1838 CMP is unremarkable. [VT]   1852 Patient be discharged home to continue using aspirin for 14 days.  He is to call her primary care provider tomorrow for follow-up [VT]      ED Course User Index  [VT] Aristeo Francisca TATUM, TALIB           MEDICAL DECISION MAKING    MDM      My differential diagnosis for altered mental status includes but is not limited to:  Hypoglycemia, hyperglycemia, COVID, flu, UTI.  Thank you for letting us care for you today.  You have tested positive for COVID-19.  You must quarantine at home for 5 days from your first day of symptoms and an additional 5 days wearing a surgical mask once leaving the house.  Please continue to take your 81 mg of aspirin daily as well as your other prescriptions.  Please ensure that you are drinking plenty of fluids to remain hydrated.  Please make an appointment to follow-up with your primary care provider to ensure complete recovery.  They will not likely see you and to you have completed your COVID quarantine.    Although you are being discharged from the ED today, I encourage you to return for worsening symptoms. Things can, and do, change such that treatment at home with medication may not be adequate. Specifically I recommend returning for chest pain or discomfort, difficulty breathing, persistent vomiting or difficulty holding down liquids or medications, fever > 102.0 F,  or any other worsening or alarming symptoms.      Rest. Drink plenty of fluids.  Follow up with PCP or provider listed for further evaluation and management.  Follow up with primary care provider for further management.  Take all medications as prescribed.  DIAGNOSIS  Final diagnoses:   COVID-19   Dehydration       Latest Documented Vital Signs:  As of 19:16 EST  BP- 176/89 HR- 102 Temp- 100.4 °F (38 °C) (Oral)  O2 sat- 91%    DISPOSITION      Discussed pertinent findings with the patient/family.  Patient/Family voiced understanding of need to follow-up for recheck and further testing as needed.  Return to the Emergency Department warnings were given.         Medication List      Changed    Diclofenac Sodium 1 % gel gel  Commonly known as: VOLTAREN  Apply 4 g topically to the appropriate area as directed 4 (Four) Times a Day.  What changed:   · when to take this  · reasons to take this                 Follow-up Information     Ana Luisa Diaz MD. Call in 1 day.    Specialties: Internal Medicine & Pediatrics, Pediatrics  Contact information:  [address] [facility] [address]  [facility] [address] [phone]                           Dictated utilizing Dragon dictation     Francisca Alberts, APRN  02/15/23 0833

## 2023-02-16 ENCOUNTER — PATIENT OUTREACH (OUTPATIENT)
Dept: CASE MANAGEMENT | Facility: OTHER | Age: 78
End: 2023-02-16
Payer: MEDICARE

## 2023-02-16 NOTE — DISCHARGE INSTRUCTIONS
Thank you for letting us care for you today.  You have tested positive for COVID-19.  You must quarantine at home for 5 days from your first day of symptoms and an additional 5 days wearing a surgical mask once leaving the house.  Please continue to take your 81 mg of aspirin daily as well as your other prescriptions.  Please ensure that you are drinking plenty of fluids to remain hydrated.  Please make an appointment to follow-up with your primary care provider to ensure complete recovery.  They will not likely see you and to you have completed your COVID quarantine.    Although you are being discharged from the ED today, I encourage you to return for worsening symptoms. Things can, and do, change such that treatment at home with medication may not be adequate. Specifically I recommend returning for chest pain or discomfort, difficulty breathing, persistent vomiting or difficulty holding down liquids or medications, fever > 102.0 F,  or any other worsening or alarming symptoms.      Rest. Drink plenty of fluids.  Follow up with PCP or provider listed for further evaluation and management.  Follow up with primary care provider for further management.  Take all medications as prescribed.

## 2023-02-16 NOTE — OUTREACH NOTE
AMBULATORY CASE MANAGEMENT NOTE    Name and Relationship of Patient/Support Person: Adali Hankins J - Self    ED Potential Covid Discharge Follow-up  RN-ACM outreach with patient and spouse. Discussed 2/15/23 ED visit regarding COVID 19 and dehydration. Patient treated and discharged home . Patient states to be compliant with ED recommendations; states to feel tired; has had chills; decrease in appetite and no difficulty with chest pain; SOB; muscle aches; nausea; vomiting headaches or sleeping. Patient's spouse states improvement regarding confusion; tolerating fluids and voiced intent to monitor blood pressure. Patient has 2/21/23 PCP appointment scheduled.  and states symptoms have improved. Reviewed with patient and spouse ED AVS recommendations; education;  hydration;  24/7 Nurse Triage Line; COVID 19 information telephone line; ACM contact information; continued monitoring of symptoms; evaluation of worsening symptoms and establishing contact with PCP for follow up recommendations. Patient and spouse verbalized understanding. No further questions voiced at this time.   Adult Patient Profile  Questions/Answers    Flowsheet Row Most Recent Value   Symptoms/Conditions Managed at Home respiratory, cardiovascular  [Patient had 2/15/23 ED visit regarding COVID 19 virus infection.]   Cardiovascular Symptoms/Conditions hypertension   Cardiovascular Management Strategies other (see comments), medication therapy, routine screening  [Physician follow up]   Respiratory Symptoms/Conditions other (see comments)  [Patient states to feel tired,  and has had episodes of chills]   Respiratory Management Strategies other (see comments)  [Physician follow up]   Barriers to Taking Medication as Prescribed none   Equipment Currently Used at Home bp cuff   Primary Source of Support/Comfort spouse, child(carito)   People in Home spouse        Social Work Assessment  Questions/Answers    Flowsheet Row Most Recent Value   People in Home  spouse   Functional Status Comments Patient states to be independent with ADL's,  light meal preparation,  ambulating without assitive deviced and receiving assitance with transportation. Patient states to currently be receiving assistance as needed following 2/15/23 ED visit   Equipment Currently Used at Home bp cuff        Send Education  Questions/Answers    Flowsheet Row Most Recent Value   Annual Wellness Visit:  Patient Has Completed   Other Patient Education/Resources  24/7 [facility] Nurse Call Line      SDOH updated and reviewed with the patient during this program:  Financial Resource Strain: Low Risk    • Difficulty of Paying Living Expenses: Not hard at all      Food Insecurity: No Food Insecurity   • Worried About Running Out of Food in the Last Year: Never true   • Ran Out of Food in the Last Year: Never true      Transportation Needs: No Transportation Needs   • Lack of Transportation (Medical): No   • Lack of Transportation (Non-Medical): No       Education Documentation  Unresolved/Worsening Symptoms, taught by Ramila Blanton RN at 2/16/2023  4:42 PM.  Learner: Family, Patient  Readiness: Acceptance  Method: Explanation  Response: Verbalizes Understanding    Protecting Others, taught by Ramila Blanton RN at 2/16/2023  4:42 PM.  Learner: Family, Patient  Readiness: Acceptance  Method: Explanation  Response: Verbalizes Understanding    Unresolved/Worsening Symptoms, taught by Ramila Blanton RN at 2/16/2023  4:42 PM.  Learner: Family, Patient  Readiness: Acceptance  Method: Explanation  Response: Verbalizes Understanding    Provider Follow-Up, taught by Ramila Blanton RN at 2/16/2023  4:42 PM.  Learner: Family, Patient  Readiness: Acceptance  Method: Explanation  Response: Verbalizes Understanding    Medication Management, taught by Ramila Blanton RN at 2/16/2023  4:42 PM.  Learner: Family, Patient  Readiness: Acceptance  Method: Explanation  Response: Verbalizes  Understanding    Blood Pressure Monitoring, taught by Ramila Blanton, RN at 2/16/2023  4:42 PM.  Learner: Family, Patient  Readiness: Acceptance  Method: Explanation  Response: Verbalizes Understanding          Ramila NESS  Ambulatory Case Management    2/16/2023, 16:43 EST

## 2023-02-21 ENCOUNTER — OFFICE VISIT (OUTPATIENT)
Dept: INTERNAL MEDICINE | Facility: CLINIC | Age: 78
End: 2023-02-21
Payer: MEDICARE

## 2023-02-21 VITALS
SYSTOLIC BLOOD PRESSURE: 128 MMHG | TEMPERATURE: 97.1 F | HEIGHT: 64 IN | BODY MASS INDEX: 23.32 KG/M2 | WEIGHT: 136.6 LBS | OXYGEN SATURATION: 99 % | HEART RATE: 88 BPM | DIASTOLIC BLOOD PRESSURE: 72 MMHG

## 2023-02-21 DIAGNOSIS — R41.3 MEMORY IMPAIRMENT: Primary | ICD-10-CM

## 2023-02-21 PROCEDURE — 99213 OFFICE O/P EST LOW 20 MIN: CPT | Performed by: INTERNAL MEDICINE

## 2023-02-21 NOTE — PROGRESS NOTES
Adali Hankins is a 77 y.o. female who presents with a chief complaint of   Chief Complaint   Patient presents with   • Hospital Follow Up Visit       HPI     Been feeling a little better since 2/15/23.  Had some confusion during the visit and around the visit to the ED.  Lasted for 1 day and had incontinence.  These all improved    The following portions of the patient's history were reviewed and updated as appropriate: allergies, current medications, past family history, past medical history, past social history, past surgical history and problem list.      Current Outpatient Medications:   •  aspirin 81 MG tablet, Take 81 mg by mouth daily., Disp: , Rfl:   •  CALCIUM CITRATE-VITAMIN D PO, , Disp: , Rfl:   •  coenzyme Q10 100 MG capsule, Take 100 mg by mouth Daily., Disp: , Rfl:   •  Cranberry-Vitamin C (AZO CRANBERRY URINARY TRACT PO), Take  by mouth 2 (Two) Times a Day., Disp: , Rfl:   •  Diclofenac Sodium (VOLTAREN) 1 % gel gel, Apply 4 g topically to the appropriate area as directed 4 (Four) Times a Day. (Patient taking differently: Apply 4 g topically to the appropriate area as directed As Needed.), Disp: 350 g, Rfl: 2  •  fluticasone (FLONASE) 50 MCG/ACT nasal spray, USE 2 SPRAYS IN EACH NOSTRIL ONE TIME DAILY AS DIRECTED, Disp: 48 g, Rfl: 2  •  Ibuprofen 200 MG capsule, As Needed., Disp: , Rfl:   •  lisinopril (PRINIVIL,ZESTRIL) 20 MG tablet, Take 2 tablets by mouth Daily., Disp: 90 tablet, Rfl: 1  •  Magnesium 500 MG capsule, Take  by mouth., Disp: , Rfl:   •  metoprolol succinate XL (TOPROL-XL) 25 MG 24 hr tablet, Take 1 tablet by mouth Daily., Disp: 90 tablet, Rfl: 3  •  rosuvastatin (CRESTOR) 5 MG tablet, TAKE 1 TABLET EVERY DAY, Disp: 90 tablet, Rfl: 3  •  vitamin B-12 (CYANOCOBALAMIN) 1000 MCG tablet, Take 1,000 mcg by mouth Daily., Disp: , Rfl:   •  vitamin E 1000 UNIT capsule, Take 1,000 Units by mouth Daily., Disp: , Rfl:             Physical Exam  /72   Pulse 88   Temp 97.1 °F (36.2  "°C)   Ht 162.6 cm (64.02\")   Wt 62 kg (136 lb 9.6 oz)   SpO2 99%   BMI 23.44 kg/m²     Physical Exam  Vitals reviewed.   Constitutional:       General: She is not in acute distress.     Appearance: Normal appearance.   HENT:      Head: Normocephalic and atraumatic.      Nose: Nose normal.      Mouth/Throat:      Mouth: Mucous membranes are moist.   Eyes:      Conjunctiva/sclera: Conjunctivae normal.   Cardiovascular:      Rate and Rhythm: Normal rate and regular rhythm.      Pulses: Normal pulses.      Heart sounds: Normal heart sounds.   Pulmonary:      Effort: Pulmonary effort is normal.      Breath sounds: Normal breath sounds.   Abdominal:      Palpations: Abdomen is soft.      Tenderness: There is no abdominal tenderness.   Musculoskeletal:      Right lower leg: No edema.      Left lower leg: No edema.   Skin:     General: Skin is warm and dry.   Neurological:      General: No focal deficit present.      Mental Status: She is alert.   Psychiatric:         Mood and Affect: Mood normal.           Results for orders placed or performed during the hospital encounter of 02/15/23   COVID-19 and FLU A/B PCR - Swab, Nasopharynx    Specimen: Nasopharynx; Swab   Result Value Ref Range    COVID19 Detected (C) Not Detected - Ref. Range    Influenza A PCR Not Detected Not Detected    Influenza B PCR Not Detected Not Detected   Comprehensive Metabolic Panel    Specimen: Blood   Result Value Ref Range    Glucose 114 (H) 65 - 99 mg/dL    BUN 8 8 - 23 mg/dL    Creatinine 0.80 0.57 - 1.00 mg/dL    Sodium 137 136 - 145 mmol/L    Potassium 3.9 3.5 - 5.2 mmol/L    Chloride 101 98 - 107 mmol/L    CO2 25.2 22.0 - 29.0 mmol/L    Calcium 9.2 8.6 - 10.5 mg/dL    Total Protein 7.1 6.0 - 8.5 g/dL    Albumin 4.4 3.5 - 5.2 g/dL    ALT (SGPT) 8 1 - 33 U/L    AST (SGOT) 16 1 - 32 U/L    Alkaline Phosphatase 55 39 - 117 U/L    Total Bilirubin 0.7 0.0 - 1.2 mg/dL    Globulin 2.7 gm/dL    A/G Ratio 1.6 g/dL    BUN/Creatinine Ratio 10.0 7.0 - " 25.0    Anion Gap 10.8 5.0 - 15.0 mmol/L    eGFR 75.5 >60.0 mL/min/1.73   Urinalysis With Microscopic If Indicated (No Culture) - Urine, Catheter    Specimen: Urine, Catheter   Result Value Ref Range    Color, UA Yellow Yellow, Straw    Appearance, UA Clear Clear    pH, UA 6.5 4.5 - 8.0    Specific Gravity, UA 1.025 1.003 - 1.030    Glucose, UA Negative Negative    Ketones, UA 80 mg/dL (3+) (A) Negative    Bilirubin, UA Negative Negative    Blood, UA Large (3+) (A) Negative    Protein,  mg/dL (2+) (A) Negative    Leuk Esterase, UA Negative Negative    Nitrite, UA Negative Negative    Urobilinogen, UA 0.2 E.U./dL 0.2 - 1.0 E.U./dL   CBC Auto Differential    Specimen: Blood   Result Value Ref Range    WBC 6.45 3.40 - 10.80 10*3/mm3    RBC 4.35 3.77 - 5.28 10*6/mm3    Hemoglobin 13.5 12.0 - 15.9 g/dL    Hematocrit 39.8 34.0 - 46.6 %    MCV 91.5 79.0 - 97.0 fL    MCH 31.0 26.6 - 33.0 pg    MCHC 33.9 31.5 - 35.7 g/dL    RDW 12.5 12.3 - 15.4 %    RDW-SD 41.5 37.0 - 54.0 fl    MPV 10.6 6.0 - 12.0 fL    Platelets 126 (L) 140 - 450 10*3/mm3    Neutrophil % 77.3 (H) 42.7 - 76.0 %    Lymphocyte % 11.3 (L) 19.6 - 45.3 %    Monocyte % 10.9 5.0 - 12.0 %    Eosinophil % 0.0 (L) 0.3 - 6.2 %    Basophil % 0.3 0.0 - 1.5 %    Immature Grans % 0.2 0.0 - 0.5 %    Neutrophils, Absolute 4.99 1.70 - 7.00 10*3/mm3    Lymphocytes, Absolute 0.73 0.70 - 3.10 10*3/mm3    Monocytes, Absolute 0.70 0.10 - 0.90 10*3/mm3    Eosinophils, Absolute 0.00 0.00 - 0.40 10*3/mm3    Basophils, Absolute 0.02 0.00 - 0.20 10*3/mm3    Immature Grans, Absolute 0.01 0.00 - 0.05 10*3/mm3    nRBC 0.0 0.0 - 0.2 /100 WBC   Urinalysis, Microscopic Only - Urine, Catheter    Specimen: Urine, Catheter   Result Value Ref Range    RBC, UA 21-30 (A) None Seen /HPF    WBC, UA 0-2 (A) None Seen /HPF    Bacteria, UA Trace (A) None Seen /HPF    Squamous Epithelial Cells, UA None Seen None Seen, 0-2 /HPF    Hyaline Casts, UA None Seen None Seen /LPF    Methodology Manual  Light Microscopy    Green Top (Gel)   Result Value Ref Range    Extra Tube Hold for add-ons.    Lavender Top   Result Value Ref Range    Extra Tube hold for add-on    Gold Top - SST   Result Value Ref Range    Extra Tube Hold for add-ons.    Light Blue Top   Result Value Ref Range    Extra Tube Hold for add-ons.            Diagnoses and all orders for this visit:    1. Memory impairment (Primary)  Assessment & Plan:  Memory issues more and more prominent especially with illness. Previously being worked up by neurology but got lost to f/u with neuropsych testing.  Reviewed her MRI from 2021 with family and re-referred to neurology.  Discussed my understanding of microvascular disease.  Family is interested in drug initiation for dementia if indicated.  Defer to neurology expertise on this question    Doing well post COVID otherwise and mental status in good shape currently, but is generally steadily declining.     Orders:  -     Ambulatory Referral to Neurology

## 2023-02-21 NOTE — ASSESSMENT & PLAN NOTE
Memory issues more and more prominent especially with illness. Previously being worked up by neurology but got lost to f/u with neuropsych testing.  Reviewed her MRI from 2021 with family and re-referred to neurology.  Discussed my understanding of microvascular disease.  Family is interested in drug initiation for dementia if indicated.  Defer to neurology expertise on this question    Doing well post COVID otherwise and mental status in good shape currently, but is generally steadily declining.

## 2023-02-24 ENCOUNTER — TELEPHONE (OUTPATIENT)
Dept: NEUROLOGY | Facility: CLINIC | Age: 78
End: 2023-02-24

## 2023-02-24 NOTE — TELEPHONE ENCOUNTER
NEW REFERRAL         Best call back number:[phone]  Who is your current provider:  DR SWARTZ  LAST SEEN 12/27/21    Who would you like your new provider to be:  DR BAEZA     What are your reasons for transferring care:  CLOSER TO HOME     Additional notes:  MESSAGE SENT ON REFERRAL ALSO

## 2023-03-03 ENCOUNTER — OFFICE VISIT (OUTPATIENT)
Dept: NEUROLOGY | Facility: CLINIC | Age: 78
End: 2023-03-03
Payer: MEDICARE

## 2023-03-03 VITALS
DIASTOLIC BLOOD PRESSURE: 92 MMHG | WEIGHT: 138 LBS | HEIGHT: 64 IN | OXYGEN SATURATION: 98 % | HEART RATE: 79 BPM | BODY MASS INDEX: 23.56 KG/M2 | SYSTOLIC BLOOD PRESSURE: 178 MMHG

## 2023-03-03 DIAGNOSIS — F01.50 VASCULAR DEMENTIA WITHOUT BEHAVIORAL DISTURBANCE, PSYCHOTIC DISTURBANCE, MOOD DISTURBANCE, OR ANXIETY, UNSPECIFIED DEMENTIA SEVERITY: Primary | ICD-10-CM

## 2023-03-03 PROCEDURE — 99215 OFFICE O/P EST HI 40 MIN: CPT | Performed by: PSYCHIATRY & NEUROLOGY

## 2023-03-03 RX ORDER — DONEPEZIL HYDROCHLORIDE 10 MG/1
10 TABLET, FILM COATED ORAL DAILY
Qty: 90 TABLET | Refills: 3 | Status: SHIPPED | OUTPATIENT
Start: 2023-03-03 | End: 2024-03-02

## 2023-03-03 RX ORDER — DONEPEZIL HYDROCHLORIDE 5 MG/1
5 TABLET, FILM COATED ORAL DAILY
Qty: 30 TABLET | Refills: 0 | Status: SHIPPED | OUTPATIENT
Start: 2023-03-03 | End: 2023-04-02

## 2023-03-03 NOTE — PROGRESS NOTES
Notes by MA:  Patient has been referred to our office for memory loss. Patient presents today with their , Evens and her daughter, Megan and has given consent to give health information to them.         Subjective:   77-year-old woman with memory deficit.  I reviewed her previous neurological work-ups.  Her work-up for reversible causes has been unremarkable.  Her comprehensive metabolic profile, TSH, and B12 levels were unremarkable.  I reviewed her MRI from her initial work-up from September 2021.  This study revealed remote hypertensive microhemorrhages in the basal ganglia and extensive deep white matter chronic ischemic changes.  Patient ID: Adali Hankins is a 77 y.o. female.    History of Present Illness  The following portions of the patient's history were reviewed and updated as appropriate: allergies, current medications, past family history, past medical history, past social history, past surgical history and problem list.    Review of Systems   Constitutional: Negative for activity change, appetite change and fatigue.   HENT: Negative for facial swelling and trouble swallowing.    Eyes: Negative for photophobia, pain and visual disturbance.   Respiratory: Negative for chest tightness, shortness of breath and wheezing.    Cardiovascular: Negative for chest pain, palpitations and leg swelling.   Gastrointestinal: Negative for abdominal pain, nausea and vomiting.   Musculoskeletal: Positive for gait problem. Negative for arthralgias, back pain, joint swelling, myalgias, neck pain and neck stiffness.   Neurological: Negative for dizziness, tremors, seizures, syncope, facial asymmetry, speech difficulty, weakness, light-headedness, numbness and headaches.   Hematological: Does not bruise/bleed easily.   Psychiatric/Behavioral: Positive for agitation and confusion. Negative for behavioral problems, decreased concentration, dysphoric mood, hallucinations, self-injury, sleep disturbance and suicidal  ideas. The patient is not nervous/anxious and is not hyperactive.         Objective:    Neurologic Exam  Awake alert pleasant cooperative.  Speech is fluent.  Easily loses her train of thought.  Folstein score is 23 with deficits primarily in orientation and attention.  Clock drawing is 3 out of 4.  She only recalled 6 animals on animal fluency.    Cranial nerves II through XII normal and symmetric.    Motor exam reveals good, symmetric tone bulk and power without drift.    Sensory exam reveals symmetric sensation to light touch temperature vibration bilaterally.    Coordination testing reveals smooth and accurate finger-nose-finger normal heel-to-shin exams with a negative Romberg.  She walks in a narrow base with reasonable stride length.  No parkinsonian features.  She turns well.    Tendon reflexes are 1+ and symmetric including ankle jerks.  Plantar signs are equivocal.  Physical Exam    Assessment/Plan:     Diagnoses and all orders for this visit:    1. Vascular dementia without behavioral disturbance, psychotic disturbance, mood disturbance, or anxiety, unspecified dementia severity (HCC) (Primary)     77-year-old woman with vascular dementia.  Work-up for reversible/modifiable causes has been thorough.  I reviewed her work-up and her brain imaging with her and her family and answered their questions about the diagnosis and underlying pathophysiology.  I encouraged her to continue her low-dose aspirin and to continue to work with Dr. Diaz toward meticulous blood pressure control.  I am starting her on Aricept with a titration towards 10 mg daily over the next month.  We talked about potential side effects as well as reasonable expectations.  I would like to see her back in 3 months to check on her progress and make any changes or additions that may be necessary at that time.  Thank you very much for the opportunity to participate in this marcelino woman's neurological care.    45 minutes patient care time  today.

## 2023-03-07 ENCOUNTER — PATIENT ROUNDING (BHMG ONLY) (OUTPATIENT)
Dept: NEUROLOGY | Facility: CLINIC | Age: 78
End: 2023-03-07
Payer: MEDICARE

## 2023-03-10 ENCOUNTER — TELEPHONE (OUTPATIENT)
Dept: NEUROLOGY | Facility: CLINIC | Age: 78
End: 2023-03-10
Payer: MEDICARE

## 2023-03-10 NOTE — TELEPHONE ENCOUNTER
03/10/2023 Lm that her appointment for 06/05/2023 needs to be rescheduled, Dr Galindo will be out of the office that day

## 2023-03-24 DIAGNOSIS — E78.5 HYPERLIPIDEMIA, UNSPECIFIED HYPERLIPIDEMIA TYPE: ICD-10-CM

## 2023-03-24 NOTE — TELEPHONE ENCOUNTER
D/c on 2/28 due to alternate therapy    Rx Refill Note  Requested Prescriptions     Pending Prescriptions Disp Refills    atorvastatin (LIPITOR) 10 MG tablet 90 tablet 1     Sig: Take 1 tablet by mouth Daily.      Last office visit with prescribing clinician: 8/10/2022   Last telemedicine visit with prescribing clinician: Visit date not found   Next office visit with prescribing clinician: Visit date not found                         Would you like a call back once the refill request has been completed: [] Yes [] No    If the office needs to give you a call back, can they leave a voicemail: [] Yes [] No    Mary Warner, PCT  03/24/23, 16:45 EDT

## 2023-03-24 NOTE — TELEPHONE ENCOUNTER
Caller: [facility] - [place], [place] - [address] - [phone] [facility] [phone]    Relationship: Pharmacy    Best call back number: [phone]    Requested Prescriptions:   Requested Prescriptions     Pending Prescriptions Disp Refills   • atorvastatin (LIPITOR) 10 MG tablet 90 tablet 1     Sig: Take 1 tablet by mouth Daily.        Pharmacy where request should be sent: [facility] - [place], [place] - [address] - [phone] [facility] [phone]     Last office visit with prescribing clinician: 8/10/2022   Last telemedicine visit with prescribing clinician: Visit date not found   Next office visit with prescribing clinician: Visit date not found     Additional details provided by patient: PHARMACY REQUESTING REFILL    Does the patient have less than a 3 day supply:  [] Yes  [x] No    Would you like a call back once the refill request has been completed: [] Yes [x] No    If the office needs to give you a call back, can they leave a voicemail: [] Yes [x] No    Manda Miranda, PCT   03/24/23 14:57 EDT

## 2023-03-27 RX ORDER — ATORVASTATIN CALCIUM 10 MG/1
10 TABLET, FILM COATED ORAL DAILY
Qty: 90 TABLET | Refills: 1 | OUTPATIENT
Start: 2023-03-27

## 2023-04-03 ENCOUNTER — TELEPHONE (OUTPATIENT)
Dept: INTERNAL MEDICINE | Facility: CLINIC | Age: 78
End: 2023-04-03

## 2023-04-03 DIAGNOSIS — I10 ESSENTIAL HYPERTENSION: ICD-10-CM

## 2023-04-03 RX ORDER — LISINOPRIL 20 MG/1
TABLET ORAL
Qty: 180 TABLET | Refills: 0 | Status: SHIPPED | OUTPATIENT
Start: 2023-04-03

## 2023-04-03 RX ORDER — LISINOPRIL 20 MG/1
40 TABLET ORAL DAILY
Qty: 90 TABLET | Refills: 1 | OUTPATIENT
Start: 2023-04-03

## 2023-04-03 NOTE — TELEPHONE ENCOUNTER
She called back about her Lipitor but it looks like she takes Crestor.  I will call her back if thi is correct let me know.

## 2023-04-03 NOTE — TELEPHONE ENCOUNTER
PATIENTS DAUGHTER MO SHE IS CALLING BACK AND SHE WANTS TO SPEAK TO MARIA VICTORIA ABOUT SOME THING ON THE MEDICATION.      CALLBACK NUMBER IS  [phone]

## 2023-08-27 DIAGNOSIS — E78.5 HYPERLIPIDEMIA, UNSPECIFIED HYPERLIPIDEMIA TYPE: ICD-10-CM

## 2023-08-28 ENCOUNTER — TELEPHONE (OUTPATIENT)
Dept: INTERNAL MEDICINE | Facility: CLINIC | Age: 78
End: 2023-08-28
Payer: MEDICARE

## 2023-08-28 RX ORDER — ROSUVASTATIN CALCIUM 5 MG/1
TABLET, COATED ORAL
Qty: 90 TABLET | Refills: 3 | Status: SHIPPED | OUTPATIENT
Start: 2023-08-28

## 2023-08-28 NOTE — TELEPHONE ENCOUNTER
Rx Refill Note  Requested Prescriptions     Pending Prescriptions Disp Refills    rosuvastatin (CRESTOR) 5 MG tablet [Pharmacy Med Name: ROSUVASTATIN CALCIUM 5 MG Tablet] 90 tablet 3     Sig: TAKE 1 TABLET EVERY DAY      Last office visit with prescribing clinician: 7/12/2023   Last telemedicine visit with prescribing clinician: Visit date not found   Next office visit with prescribing clinician: 10/13/2023                         Would you like a call back once the refill request has been completed: [] Yes [] No    If the office needs to give you a call back, can they leave a voicemail: [] Yes [] No    Meg Joshua MA  08/28/23, 11:40 EDT

## 2023-08-30 DIAGNOSIS — I10 ESSENTIAL HYPERTENSION: ICD-10-CM

## 2023-08-30 RX ORDER — LISINOPRIL 20 MG/1
40 TABLET ORAL DAILY
Qty: 180 TABLET | Refills: 0
Start: 2023-08-30 | End: 2023-08-31 | Stop reason: SDUPTHER

## 2023-08-31 ENCOUNTER — TELEPHONE (OUTPATIENT)
Dept: INTERNAL MEDICINE | Facility: CLINIC | Age: 78
End: 2023-08-31

## 2023-08-31 DIAGNOSIS — I10 ESSENTIAL HYPERTENSION: ICD-10-CM

## 2023-08-31 RX ORDER — LISINOPRIL 20 MG/1
40 TABLET ORAL DAILY
Qty: 180 TABLET | Refills: 0
Start: 2023-08-31 | End: 2023-08-31 | Stop reason: SDUPTHER

## 2023-08-31 RX ORDER — LISINOPRIL 20 MG/1
40 TABLET ORAL DAILY
Qty: 180 TABLET | Refills: 0
Start: 2023-08-31

## 2023-08-31 NOTE — TELEPHONE ENCOUNTER
Tried to call pharmacy to find out what is going on as it has been sent two times now. I think it is probably an insurance issue saying it is too soon to fill since patient was accidentally taking more than she was prescribed, but will call to verify. Pharmacy is closed for lunch right now- will call back again when they open.

## 2023-08-31 NOTE — TELEPHONE ENCOUNTER
Caller: ÁNGELAMOLLY    Relationship: Emergency Contact    Best call back number:     What is the best time to reach you:     Who are you requesting to speak with (clinical staff, provider,  specific staff member):     Do you know the name of the person who called:     What was the call regarding: PATIENTS DAUGHTER MOLLY IS CALLING IN TO SEE IF DR JOHNSON HAS CALLED IN THE PATIENTS LISINOPRIL AS THE PHARMACY STILL DOES NOT HAVE ANYTHING FOR HER  SHE WANTS TO BE CALLED BACK TO DISCUSS    Is it okay if the provider responds through MyChart:

## 2023-09-07 ENCOUNTER — APPOINTMENT (OUTPATIENT)
Dept: GENERAL RADIOLOGY | Facility: HOSPITAL | Age: 78
End: 2023-09-07
Payer: MEDICARE

## 2023-09-07 ENCOUNTER — APPOINTMENT (OUTPATIENT)
Dept: CT IMAGING | Facility: HOSPITAL | Age: 78
End: 2023-09-07
Payer: MEDICARE

## 2023-09-07 ENCOUNTER — HOSPITAL ENCOUNTER (EMERGENCY)
Facility: HOSPITAL | Age: 78
Discharge: LEFT BEFORE DISCHARGE | End: 2023-09-07
Attending: EMERGENCY MEDICINE
Payer: MEDICARE

## 2023-09-07 VITALS
HEIGHT: 64 IN | OXYGEN SATURATION: 99 % | HEART RATE: 66 BPM | BODY MASS INDEX: 23.12 KG/M2 | RESPIRATION RATE: 16 BRPM | DIASTOLIC BLOOD PRESSURE: 69 MMHG | TEMPERATURE: 97.8 F | WEIGHT: 135.4 LBS | SYSTOLIC BLOOD PRESSURE: 147 MMHG

## 2023-09-07 DIAGNOSIS — R55 SYNCOPE WITH NORMAL NEUROLOGIC EXAMINATION: ICD-10-CM

## 2023-09-07 DIAGNOSIS — Z53.29 LEFT AGAINST MEDICAL ADVICE: Primary | ICD-10-CM

## 2023-09-07 LAB — GLUCOSE BLDC GLUCOMTR-MCNC: 98 MG/DL (ref 70–130)

## 2023-09-07 PROCEDURE — 70450 CT HEAD/BRAIN W/O DYE: CPT

## 2023-09-07 PROCEDURE — 99284 EMERGENCY DEPT VISIT MOD MDM: CPT

## 2023-09-07 PROCEDURE — 82948 REAGENT STRIP/BLOOD GLUCOSE: CPT

## 2023-09-07 RX ORDER — SODIUM CHLORIDE 0.9 % (FLUSH) 0.9 %
10 SYRINGE (ML) INJECTION AS NEEDED
Status: DISCONTINUED | OUTPATIENT
Start: 2023-09-07 | End: 2023-09-07 | Stop reason: HOSPADM

## 2023-09-07 NOTE — ED PROVIDER NOTES
"Subjective   History of Present Illness  Adali \"Reuben\" Cr is a 77-year-old white female who presents secondary to syncopal type events x2.  Patient's  was just hospitalized earlier this morning.  Patient and her daughters were in the patient's room.  The daughters were going through their fathers medications with the floor nurse.  Patient was seated in the room at the time.  Daughter reports patient began staring off into space.  She stopped responding to her family.  Daughter describes this as like a seizure or a stroke.  Patient was unresponsive for several seconds.  The nursing staff came to patient's aide.  Daughter was not lifting Ms. Hankins to place her in a wheelchair when the patient \"went limp\".  Patient was transferred to a bed.  Patient was incontinent of urine.  She returned to baseline mental function.  However a couple of minutes later patient had yet another syncopal type event.  This 1 was brief for than the first.  Patient currently back to baseline.  She reports this occurs when she has not had enough to eat.  Is been several hours since patient ate.  Daughter states this is not accurate.  Ms. Hankins has experienced these type symptoms twice before.  Patient brought to this ED.  However after arriving here patient was symptom-free.  Her work-ups have been unremarkable.  Patient has been seen by her PCP as well as a neurologist.  She has been diagnosed with \"vascular dementia\".  Patient presents for evaluation.    History provided by:  Patient (Daughter is the main historian.)  History limited by:  Dementia    Review of Systems   Constitutional: Negative.  Negative for fever.   HENT: Negative.  Negative for rhinorrhea.    Eyes: Negative.  Negative for redness.   Respiratory:  Negative for cough.    Cardiovascular:  Negative for chest pain.   Gastrointestinal:  Negative for abdominal pain.   Endocrine: Negative.    Genitourinary: Negative.  Negative for difficulty urinating. "   Musculoskeletal: Negative.  Negative for back pain.   Skin: Negative.  Negative for color change.   Neurological:  Positive for syncope.   Hematological: Negative.    Psychiatric/Behavioral: Negative.     All other systems reviewed and are negative.    Past Medical History:   Diagnosis Date    Arthritis     Cataracts, bilateral     Environmental allergies     History of carcinoma in situ of cervix uteri     Hyperlipidemia     Hypertension     Hypoglycemia     Memory loss     TIA (transient ischemic attack)     Urethral cyst 2017    Vaginal prolapse        No Known Allergies    Past Surgical History:   Procedure Laterality Date    BREAST BIOPSY Left     abcess drained surgically    HERNIA REPAIR      TUBAL ABDOMINAL LIGATION         Family History   Problem Relation Age of Onset    Cancer Other         colon    Hypertension Other     Stroke Brother     Breast cancer Neg Hx        Social History     Socioeconomic History    Marital status:    Tobacco Use    Smoking status: Former     Packs/day: 0.50     Years: 5.00     Pack years: 2.50     Types: Cigarettes     Quit date:      Years since quittin.7    Smokeless tobacco: Never   Vaping Use    Vaping Use: Never used   Substance and Sexual Activity    Alcohol use: No    Drug use: No    Sexual activity: Defer           Objective   Physical Exam  Vitals and nursing note reviewed.   Constitutional:       General: She is not in acute distress.     Appearance: Normal appearance. She is well-developed. She is not ill-appearing, toxic-appearing or diaphoretic.      Comments: 77-year-old white female lying in bed.  Patient appears in good overall health for age.  Vital signs are unremarkable.  Patient friendly and cooperative.   HENT:      Head: Normocephalic and atraumatic.      Right Ear: Tympanic membrane, ear canal and external ear normal.      Left Ear: Tympanic membrane, ear canal and external ear normal.      Nose: Nose normal.      Mouth/Throat:       Mouth: Mucous membranes are moist.      Pharynx: Oropharynx is clear.   Eyes:      Extraocular Movements: Extraocular movements intact.      Conjunctiva/sclera: Conjunctivae normal.      Pupils: Pupils are equal, round, and reactive to light.   Cardiovascular:      Rate and Rhythm: Normal rate and regular rhythm.      Pulses: Normal pulses.      Heart sounds: Normal heart sounds. No murmur heard.    No friction rub. No gallop.   Pulmonary:      Effort: Pulmonary effort is normal.      Breath sounds: Normal breath sounds.   Abdominal:      General: Bowel sounds are normal. There is no distension.      Palpations: Abdomen is soft. There is no mass.      Tenderness: There is no abdominal tenderness. There is no guarding or rebound.      Hernia: No hernia is present.   Musculoskeletal:         General: Normal range of motion.      Cervical back: Normal range of motion and neck supple.   Skin:     General: Skin is warm and dry.      Capillary Refill: Capillary refill takes less than 2 seconds.   Neurological:      General: No focal deficit present.      Mental Status: She is alert and oriented to person, place, and time.      Cranial Nerves: No cranial nerve deficit.      Sensory: No sensory deficit.      Motor: No weakness.      Coordination: Coordination normal.      Deep Tendon Reflexes: Reflexes are normal and symmetric.   Psychiatric:         Mood and Affect: Mood normal.         Behavior: Behavior normal.       Procedures           ED Course  ED Course as of 09/07/23 0441   Thu Sep 07, 2023   0347 Physical and neurologic exam currently unremarkable.  Accu-Chek 98.  Obtaining CT head, chest x-ray, EKG and full set of labs.  Also obtain orthostatics. [SS]   0403 Patient currently refusing all work except for CT head. [SS]   0433 Head CT shows age-appropriate changes.  Otherwise unremarkable.  Discussed this result with patient and her daughter.  Also discussed need for further evaluation.  Patient is refusing  any further work-up.  Thus she is signing out AGAINST MEDICAL ADVICE.  I advised against this but obviously cannot submit the patient to test against her will. [SS]      ED Course User Index  [SS] Mahesh Carranza MD      CT Head Without Contrast    Result Date: 9/7/2023  Narrative: CT HEAD WO CONTRAST-  HISTORY: Near syncope.  TECHNIQUE: Axial unenhanced head CT with multiplanar reformats. Radiation dose reduction techniques included automated exposure control or exposure modulation based on body size. Count of known CT and cardiac nuc med studies performed in previous 12 months: 0.  COMPARISON: None.  FINDINGS: Ventricular size and configuration are normal. No acute infarct or hemorrhage is identified. There are no masses. There is no skull fracture. There is atrophy with chronic small vessel ischemic disease in the white matter.      Impression: Senescent changes without acute abnormality.    This report was finalized on 9/7/2023 4:25 AM by Dr. Phuc Brandt MD.          My differential diagnosis for syncope includes but is not limited to:  Vasovagal reflex - situational stimulus, micturition, defecation, cough, sneezing, swallowing, postprandial state, react sinus hypersensitivity  Vascular-prolonged recumbency, sudden postural change, prolonged standing, hypovolemia, vasodilator drugs, autonomic neuropathy, adrenal insufficiency, subclavian steal, pulmonary embolism  Cardiac -arrhythmia, heart block, myocardial infarction, aortic stenosis, cardiac myxoma, cardiac, LV Dysfunction, Aortic Dissection, Pulmonary Hypertension, Pulmonary Stenosis, Pacemaker Failure  CNS-seizure, hypoxia, hypoglycemia, TIA,(basal vertebral), hydrocephalus                                  Medical Decision Making  Amount and/or Complexity of Data Reviewed  Labs: ordered.  Radiology: ordered.  ECG/medicine tests: ordered.    Risk  Prescription drug management.        Final diagnoses:   Left against medical advice   Syncope with normal  neurologic examination       ED Disposition  ED Disposition       ED Disposition   AMA    Condition   --    Comment   --               Ana Luisa Diaz MD  [address] [facility] [address]  [place] [id number] [phone]    Call   First thing tomorrow morning.  Inform office staff of ER visit.  Follow-up further recommendation.         Medication List      No changes were made to your prescriptions during this visit.            Mahesh Carranza MD  09/07/23 0445

## 2023-09-07 NOTE — DISCHARGE INSTRUCTIONS
Return to the ED if you change your mind about further evaluation.  Follow-up with your PCP as above.  Return to ED for recurrent symptoms, medical emergencies.

## 2023-09-07 NOTE — ED NOTES
This RN went into pt's room to start IV and get labs and told pt and family the treatment plan. Patient immediately refused all interventions and stated that she only wanted food. After much discussion, PT did agree to head CT but still is refusing, IV, labs, chest x ray, urine sample, and EKG. MD notified.

## 2023-09-10 DIAGNOSIS — I10 ESSENTIAL HYPERTENSION: ICD-10-CM

## 2023-09-11 RX ORDER — HYDROCHLOROTHIAZIDE 25 MG/1
TABLET ORAL
Qty: 90 TABLET | Refills: 0 | Status: SHIPPED | OUTPATIENT
Start: 2023-09-11

## 2023-09-11 NOTE — TELEPHONE ENCOUNTER
Rx Refill Note  Requested Prescriptions     Pending Prescriptions Disp Refills    hydroCHLOROthiazide (HYDRODIURIL) 25 MG tablet [Pharmacy Med Name: HYDROCHLOROTHIAZIDE 25 MG Tablet] 90 tablet 0     Sig: TAKE 1 TABLET EVERY DAY      Last office visit with prescribing clinician: 7/12/2023   Last telemedicine visit with prescribing clinician: Visit date not found   Next office visit with prescribing clinician: 9/12/2023                         Would you like a call back once the refill request has been completed: [] Yes [] No    If the office needs to give you a call back, can they leave a voicemail: [] Yes [] No    Meg Joshua MA  09/11/23, 10:41 EDT

## 2023-09-12 ENCOUNTER — TELEPHONE (OUTPATIENT)
Dept: NEUROLOGY | Facility: CLINIC | Age: 78
End: 2023-09-12
Payer: MEDICARE

## 2023-09-12 ENCOUNTER — OFFICE VISIT (OUTPATIENT)
Dept: INTERNAL MEDICINE | Facility: CLINIC | Age: 78
End: 2023-09-12
Payer: MEDICARE

## 2023-09-12 VITALS
OXYGEN SATURATION: 98 % | DIASTOLIC BLOOD PRESSURE: 80 MMHG | WEIGHT: 131.6 LBS | HEIGHT: 64 IN | BODY MASS INDEX: 22.47 KG/M2 | SYSTOLIC BLOOD PRESSURE: 138 MMHG | HEART RATE: 69 BPM | TEMPERATURE: 97.7 F

## 2023-09-12 DIAGNOSIS — F01.B0 MODERATE VASCULAR DEMENTIA WITHOUT BEHAVIORAL DISTURBANCE, PSYCHOTIC DISTURBANCE, MOOD DISTURBANCE, OR ANXIETY: ICD-10-CM

## 2023-09-12 DIAGNOSIS — I10 ESSENTIAL HYPERTENSION: ICD-10-CM

## 2023-09-12 DIAGNOSIS — R40.4 EPISODES OF STARING: Primary | ICD-10-CM

## 2023-09-12 DIAGNOSIS — E78.5 HYPERLIPIDEMIA, UNSPECIFIED HYPERLIPIDEMIA TYPE: ICD-10-CM

## 2023-09-12 PROCEDURE — 3079F DIAST BP 80-89 MM HG: CPT | Performed by: INTERNAL MEDICINE

## 2023-09-12 PROCEDURE — 99214 OFFICE O/P EST MOD 30 MIN: CPT | Performed by: INTERNAL MEDICINE

## 2023-09-12 PROCEDURE — 1160F RVW MEDS BY RX/DR IN RCRD: CPT | Performed by: INTERNAL MEDICINE

## 2023-09-12 PROCEDURE — 3075F SYST BP GE 130 - 139MM HG: CPT | Performed by: INTERNAL MEDICINE

## 2023-09-12 PROCEDURE — 1159F MED LIST DOCD IN RCRD: CPT | Performed by: INTERNAL MEDICINE

## 2023-09-12 NOTE — PROGRESS NOTES
"      Adali Hankins is a 77 y.o. female, who presents with a chief complaint of   Chief Complaint   Patient presents with    Hospital Follow Up Visit     ER f/u           HPI     Patient presents for follow-up after ED visit on 9/7 for \"episode\". Patient's  was in the hospital and she was in a chair sitting with him.  Daughter states that patient yelled out, \"I'm hungry\", before having a blank stare and becoming unresponsive followed by urinary incontinence.  She had a second episode immediately after the first.  Eyes are open during the whole episode. Pt is awake but doesn't know what is going on.  Pt reports she could hear her daughter but couldn't respond.  After the episodes the pt was tired and seemed to be having pain in her head. No closed head trauma or true syncope.  Patient states she had had very little to eat that day and had been tired and stressed about being in the hospital waiting for her  to be admitted. Patient states that she hears ringing in her ears and feels a headache prior to the episodes occurring.  Previous EEG in 2021 was normal.  Pt had CT head and left AMA prior to completion of work-up as she did not feel further testing was necessary. Pt is starting to notice when the episodes are coming on.  She feels like fatigue stress, and hunger are episodes.  Pt has been having more bad days than good days lately.      This has happened multiple times in the past and she sees her neurologist for vascular dementia. Deny increase in frequency of these spells. Next appointment has been requested via Interviewt by family, awaiting appointment. Family feels she is having more bad days than good surrounding her dementia.     The following portions of the patient's history were reviewed and updated as appropriate: allergies, current medications, past family history, past medical history, past social history, past surgical history and problem list.    Allergies: Patient has no known " allergies.    Review of Systems   Constitutional: Negative.    HENT: Negative.     Eyes: Negative.    Respiratory: Negative.     Cardiovascular: Negative.    Gastrointestinal: Negative.    Endocrine: Negative.    Genitourinary: Negative.    Musculoskeletal: Negative.    Skin: Negative.    Allergic/Immunologic: Negative.    Neurological: Negative.    Hematological: Negative.    Psychiatric/Behavioral: Negative.     All other systems reviewed and are negative.          Wt Readings from Last 3 Encounters:   09/12/23 59.7 kg (131 lb 9.6 oz)   09/07/23 61.4 kg (135 lb 6.4 oz)   07/12/23 59.1 kg (130 lb 6.4 oz)     Temp Readings from Last 3 Encounters:   09/12/23 97.7 °F (36.5 °C) (Infrared)   09/07/23 97.8 °F (36.6 °C) (Oral)   07/12/23 97.5 °F (36.4 °C) (Infrared)     BP Readings from Last 3 Encounters:   09/12/23 138/80   09/07/23 147/69   07/12/23 150/90     Pulse Readings from Last 3 Encounters:   09/12/23 69   09/07/23 66   07/12/23 83     Body mass index is 22.58 kg/m².  SpO2 Readings from Last 3 Encounters:   09/12/23 98%   09/07/23 99%   07/12/23 98%          Physical Exam  Vitals and nursing note reviewed.   Constitutional:       General: She is not in acute distress.     Appearance: She is well-developed.   HENT:      Head: Normocephalic and atraumatic.      Right Ear: External ear normal.      Left Ear: External ear normal.      Nose: Nose normal.   Eyes:      Conjunctiva/sclera: Conjunctivae normal.      Pupils: Pupils are equal, round, and reactive to light.   Cardiovascular:      Rate and Rhythm: Normal rate and regular rhythm.      Heart sounds: Normal heart sounds.   Pulmonary:      Effort: Pulmonary effort is normal. No respiratory distress.      Breath sounds: Normal breath sounds. No wheezing.   Musculoskeletal:         General: Normal range of motion.      Cervical back: Normal range of motion and neck supple.      Comments: Normal gait   Skin:     General: Skin is warm and dry.   Neurological:       Mental Status: She is alert and oriented to person, place, and time.   Psychiatric:         Behavior: Behavior normal.         Thought Content: Thought content normal.         Judgment: Judgment normal.       Results for orders placed or performed during the hospital encounter of 09/07/23   POC Glucose Once    Specimen: Blood   Result Value Ref Range    Glucose 98 70 - 130 mg/dL     Result Review :            Adult Transthoracic Echo Complete W/ Cont if Necessary Per Protocol (09/23/2021 17:34)     Holter monitor - 24 hour (09/27/2021 10:34)       Assessment and Plan    Diagnoses and all orders for this visit:    1. Episodes of staring (Primary) - episodes are not true syncope.  check eeg and f/u with neurology. Will send note from today to Dr. Sadler  -     EEG; Future    2. Essential hypertension - log reviewed today.  well controlled. Cont current meds  -     EEG; Future    3. Hyperlipidemia, unspecified hyperlipidemia type    4. Moderate vascular dementia without behavioral disturbance, psychotic disturbance, mood disturbance, or anxiety  -     EEG; Future    I have seen and examined the patient independently.  Reviewed episodes with pt and her daughter/ who provide history.  Will send for EEG and then pt needs f/u with neurology for further evaluation of episodes.  Agree with above.     Ana Luisa Diaz M.D.  Attending physician  Internal Medicine and Pediatrics      BMI is within normal parameters. No other follow-up for BMI required.             Outpatient Medications Prior to Visit   Medication Sig Dispense Refill    donepezil (Aricept) 5 MG tablet Take 1 tablet by mouth Daily. 90 tablet 1    hydroCHLOROthiazide (HYDRODIURIL) 25 MG tablet TAKE 1 TABLET EVERY DAY 90 tablet 0    lisinopril (PRINIVIL,ZESTRIL) 20 MG tablet Take 2 tablets by mouth Daily. 180 tablet 0    metoprolol succinate XL (TOPROL-XL) 25 MG 24 hr tablet Take 2 tablets by mouth Daily. 180 tablet 3    rosuvastatin (CRESTOR) 5 MG  tablet TAKE 1 TABLET EVERY DAY 90 tablet 3    aspirin 81 MG tablet Take 1 tablet by mouth Daily.      CALCIUM CITRATE-VITAMIN D PO  (Patient not taking: Reported on 9/12/2023)      coenzyme Q10 100 MG capsule Take 1 capsule by mouth Daily. (Patient not taking: Reported on 7/12/2023)      Cranberry-Vitamin C (AZO CRANBERRY URINARY TRACT PO) Take  by mouth 2 (Two) Times a Day. (Patient not taking: Reported on 9/12/2023)      Diclofenac Sodium (VOLTAREN) 1 % gel gel Apply 4 g topically to the appropriate area as directed 4 (Four) Times a Day. (Patient not taking: Reported on 6/28/2023) 350 g 2    fluticasone (FLONASE) 50 MCG/ACT nasal spray USE 2 SPRAYS IN EACH NOSTRIL ONE TIME DAILY AS DIRECTED (Patient not taking: Reported on 7/12/2023) 48 g 2    Ibuprofen 200 MG capsule As Needed.      Magnesium 500 MG capsule Take  by mouth. (Patient not taking: Reported on 7/12/2023)      ondansetron ODT (ZOFRAN-ODT) 8 MG disintegrating tablet Place 1 tablet on the tongue Every 8 (Eight) Hours As Needed for Nausea or Vomiting. 12 tablet 0    vitamin B-12 (CYANOCOBALAMIN) 1000 MCG tablet Take 1 tablet by mouth Daily. (Patient not taking: Reported on 9/12/2023)      vitamin E 1000 UNIT capsule Take 1 capsule by mouth Daily. (Patient not taking: Reported on 9/12/2023)       No facility-administered medications prior to visit.     No orders of the defined types were placed in this encounter.    [unfilled]  There are no discontinued medications.      Return for Recheck after EEG.    Patient was given instructions and counseling regarding her condition or for health maintenance advice. Please see specific information pulled into the AVS if appropriate.

## 2023-09-12 NOTE — Clinical Note
Pt was in ED and then here for f/u w/ me.  EEG ordered to evaluate for possible seizure vs non-epileptic events.  Can she follow up with you after her EEG?  thanks

## 2023-09-12 NOTE — TELEPHONE ENCOUNTER
09/12/2023  M for patient to call and schedule appointment for Back to Back seizures and has been in the Hospital

## 2023-09-26 ENCOUNTER — TELEPHONE (OUTPATIENT)
Dept: INTERNAL MEDICINE | Facility: CLINIC | Age: 78
End: 2023-09-26
Payer: MEDICARE

## 2023-09-26 DIAGNOSIS — R73.9 HYPERGLYCEMIA: ICD-10-CM

## 2023-09-26 DIAGNOSIS — F01.B0 MODERATE VASCULAR DEMENTIA WITHOUT BEHAVIORAL DISTURBANCE, PSYCHOTIC DISTURBANCE, MOOD DISTURBANCE, OR ANXIETY: ICD-10-CM

## 2023-09-26 DIAGNOSIS — E78.5 HYPERLIPIDEMIA, UNSPECIFIED HYPERLIPIDEMIA TYPE: ICD-10-CM

## 2023-09-26 DIAGNOSIS — R41.3 MEMORY IMPAIRMENT: ICD-10-CM

## 2023-09-26 DIAGNOSIS — I10 ESSENTIAL HYPERTENSION: Primary | ICD-10-CM

## 2023-10-07 LAB
ALBUMIN SERPL-MCNC: 4.7 G/DL (ref 3.8–4.8)
ALBUMIN/GLOB SERPL: 1.9 {RATIO} (ref 1.2–2.2)
ALP SERPL-CCNC: 60 IU/L (ref 44–121)
ALT SERPL-CCNC: 11 IU/L (ref 0–32)
AST SERPL-CCNC: 18 IU/L (ref 0–40)
BASOPHILS # BLD AUTO: 0 X10E3/UL (ref 0–0.2)
BASOPHILS NFR BLD AUTO: 1 %
BILIRUB SERPL-MCNC: 0.9 MG/DL (ref 0–1.2)
BUN SERPL-MCNC: 14 MG/DL (ref 8–27)
BUN/CREAT SERPL: 14 (ref 12–28)
CALCIUM SERPL-MCNC: 10.2 MG/DL (ref 8.7–10.3)
CHLORIDE SERPL-SCNC: 94 MMOL/L (ref 96–106)
CHOLEST SERPL-MCNC: 223 MG/DL (ref 100–199)
CO2 SERPL-SCNC: 30 MMOL/L (ref 20–29)
CREAT SERPL-MCNC: 1.03 MG/DL (ref 0.57–1)
EGFRCR SERPLBLD CKD-EPI 2021: 56 ML/MIN/1.73
EOSINOPHIL # BLD AUTO: 0 X10E3/UL (ref 0–0.4)
EOSINOPHIL NFR BLD AUTO: 1 %
ERYTHROCYTE [DISTWIDTH] IN BLOOD BY AUTOMATED COUNT: 12.6 % (ref 11.7–15.4)
GLOBULIN SER CALC-MCNC: 2.5 G/DL (ref 1.5–4.5)
GLUCOSE SERPL-MCNC: 99 MG/DL (ref 70–99)
HBA1C MFR BLD: 5.6 % (ref 4.8–5.6)
HCT VFR BLD AUTO: 37.8 % (ref 34–46.6)
HDLC SERPL-MCNC: 80 MG/DL
HGB BLD-MCNC: 13.1 G/DL (ref 11.1–15.9)
IMM GRANULOCYTES # BLD AUTO: 0 X10E3/UL (ref 0–0.1)
IMM GRANULOCYTES NFR BLD AUTO: 0 %
LDLC SERPL CALC-MCNC: 129 MG/DL (ref 0–99)
LDLC/HDLC SERPL: 1.6 RATIO (ref 0–3.2)
LYMPHOCYTES # BLD AUTO: 1 X10E3/UL (ref 0.7–3.1)
LYMPHOCYTES NFR BLD AUTO: 33 %
MCH RBC QN AUTO: 31.3 PG (ref 26.6–33)
MCHC RBC AUTO-ENTMCNC: 34.7 G/DL (ref 31.5–35.7)
MCV RBC AUTO: 90 FL (ref 79–97)
MONOCYTES # BLD AUTO: 0.3 X10E3/UL (ref 0.1–0.9)
MONOCYTES NFR BLD AUTO: 12 %
NEUTROPHILS # BLD AUTO: 1.6 X10E3/UL (ref 1.4–7)
NEUTROPHILS NFR BLD AUTO: 53 %
PLATELET # BLD AUTO: 165 X10E3/UL (ref 150–450)
POTASSIUM SERPL-SCNC: 4.4 MMOL/L (ref 3.5–5.2)
PROT SERPL-MCNC: 7.2 G/DL (ref 6–8.5)
RBC # BLD AUTO: 4.19 X10E6/UL (ref 3.77–5.28)
SODIUM SERPL-SCNC: 135 MMOL/L (ref 134–144)
T4 FREE SERPL-MCNC: 1.43 NG/DL (ref 0.82–1.77)
TRIGL SERPL-MCNC: 83 MG/DL (ref 0–149)
TSH SERPL DL<=0.005 MIU/L-ACNC: 1.43 UIU/ML (ref 0.45–4.5)
VLDLC SERPL CALC-MCNC: 14 MG/DL (ref 5–40)
WBC # BLD AUTO: 2.9 X10E3/UL (ref 3.4–10.8)

## 2023-10-13 ENCOUNTER — OFFICE VISIT (OUTPATIENT)
Dept: INTERNAL MEDICINE | Facility: CLINIC | Age: 78
End: 2023-10-13
Payer: MEDICARE

## 2023-10-13 VITALS
SYSTOLIC BLOOD PRESSURE: 152 MMHG | TEMPERATURE: 97.5 F | BODY MASS INDEX: 22.33 KG/M2 | OXYGEN SATURATION: 99 % | WEIGHT: 130.8 LBS | HEART RATE: 81 BPM | HEIGHT: 64 IN | DIASTOLIC BLOOD PRESSURE: 104 MMHG

## 2023-10-13 DIAGNOSIS — R73.9 HYPERGLYCEMIA: ICD-10-CM

## 2023-10-13 DIAGNOSIS — Z00.00 MEDICARE ANNUAL WELLNESS VISIT, SUBSEQUENT: Primary | ICD-10-CM

## 2023-10-13 DIAGNOSIS — E78.5 HYPERLIPIDEMIA, UNSPECIFIED HYPERLIPIDEMIA TYPE: ICD-10-CM

## 2023-10-13 DIAGNOSIS — I10 ESSENTIAL HYPERTENSION: ICD-10-CM

## 2023-10-13 DIAGNOSIS — Z00.00 HEALTHCARE MAINTENANCE: ICD-10-CM

## 2023-10-13 DIAGNOSIS — F01.B0 MODERATE VASCULAR DEMENTIA WITHOUT BEHAVIORAL DISTURBANCE, PSYCHOTIC DISTURBANCE, MOOD DISTURBANCE, OR ANXIETY: ICD-10-CM

## 2023-10-13 DIAGNOSIS — Z12.31 BREAST CANCER SCREENING BY MAMMOGRAM: ICD-10-CM

## 2023-10-13 DIAGNOSIS — R40.4 EPISODES OF STARING: ICD-10-CM

## 2023-10-13 NOTE — PATIENT INSTRUCTIONS
[facility].  Call to schedule EEG  [phone]    Call for follow up appointment with neurology/Dr. Galindo   [phone]      Medicare Wellness  Personal Prevention Plan of Service     Date of Office Visit:    Encounter Provider:  Ana Luisa Diaz MD  Place of Service:  [facility]  Patient Name: Adali Hankins  :  1945    As part of the Medicare Wellness portion of your visit today, we are providing you with this personalized preventive plan of services (PPPS). This plan is based upon recommendations of the [place] Preventive Services Task Force (USPSTF) and the Advisory Committee on Immunization Practices (ACIP).    This lists the preventive care services that should be considered, and provides dates of when you are due. Items listed as completed are up-to-date and do not require any further intervention.    Health Maintenance   Topic Date Due    HEPATITIS C SCREENING  Never done    MAMMOGRAM  2023    ZOSTER VACCINE (2 of 2) 10/13/2023 (Originally 2018)    COVID-19 Vaccine (1) 10/15/2023 (Originally 4/10/1946)    INFLUENZA VACCINE  2024 (Originally 2023)    DXA SCAN  2024    LIPID PANEL  10/06/2024    ANNUAL WELLNESS VISIT  10/13/2024    TDAP/TD VACCINES (2 - Td or Tdap) 2025    COLORECTAL CANCER SCREENING  2027    Pneumococcal Vaccine 65+  Addressed       Orders Placed This Encounter   Procedures    Mammo Screening Digital Tomosynthesis Bilateral With CAD     Standing Status:   Future     Standing Expiration Date:   10/13/2024     Order Specific Question:   Reason for Exam:     Answer:   screening     Order Specific Question:   Release to patient     Answer:   Routine Release [[id number]]    Comprehensive Metabolic Panel     Standing Status:   Future     Standing Expiration Date:   10/13/2024     Order Specific Question:   Release to patient     Answer:   Routine Release [[id number]]    Hemoglobin A1c     Standing  Status:   Future     Standing Expiration Date:   10/13/2024     Order Specific Question:   Release to patient     Answer:   Routine Release [[id number]]    Lipid Panel With LDL / HDL Ratio     Standing Status:   Future     Standing Expiration Date:   10/13/2024     Order Specific Question:   Release to patient     Answer:   Routine Release [[id number]]    Hepatitis C antibody     Standing Status:   Future     Standing Expiration Date:   10/13/2024     Order Specific Question:   Release to patient     Answer:   Routine Release [[id number]]    CBC & Differential     Standing Status:   Future     Standing Expiration Date:   10/13/2024     Order Specific Question:   Manual Differential     Answer:   No     Order Specific Question:   Release to patient     Answer:   Routine Release [[id number]]       No follow-ups on file.

## 2023-10-13 NOTE — PROGRESS NOTES
The ABCs of the Annual Wellness Visit  Subsequent Medicare Wellness Visit    Subjective    Adali Hankins is a 78 y.o. female who presents for a Subsequent Medicare Wellness Visit.    The following portions of the patient's history were reviewed and   updated as appropriate: allergies, current medications, past family history, past medical history, past social history, past surgical history, and problem list.    Compared to one year ago, the patient feels her physical   health is the same.    Compared to one year ago, the patient feels her mental   health is the same.    Recent Hospitalizations:  She was not admitted to the hospital during the last year.       Current Medical Providers:  Patient Care Team:  Ana Luisa Diaz MD as PCP - General  León Duggan MD as Consulting Physician (Urology)    Outpatient Medications Prior to Visit   Medication Sig Dispense Refill    aspirin 81 MG tablet Take 1 tablet by mouth Daily.      donepezil (Aricept) 5 MG tablet Take 1 tablet by mouth Daily. 90 tablet 1    hydroCHLOROthiazide (HYDRODIURIL) 25 MG tablet TAKE 1 TABLET EVERY DAY 90 tablet 0    Ibuprofen 200 MG capsule As Needed.      lisinopril (PRINIVIL,ZESTRIL) 20 MG tablet Take 2 tablets by mouth Daily. 180 tablet 0    metoprolol succinate XL (TOPROL-XL) 25 MG 24 hr tablet Take 2 tablets by mouth Daily. 180 tablet 3    ondansetron ODT (ZOFRAN-ODT) 8 MG disintegrating tablet Place 1 tablet on the tongue Every 8 (Eight) Hours As Needed for Nausea or Vomiting. 12 tablet 0    rosuvastatin (CRESTOR) 5 MG tablet TAKE 1 TABLET EVERY DAY 90 tablet 3    CALCIUM CITRATE-VITAMIN D PO  (Patient not taking: Reported on 9/12/2023)      coenzyme Q10 100 MG capsule Take 1 capsule by mouth Daily. (Patient not taking: Reported on 7/12/2023)      Cranberry-Vitamin C (AZO CRANBERRY URINARY TRACT PO) Take  by mouth 2 (Two) Times a Day. (Patient not taking: Reported on 9/12/2023)      Diclofenac Sodium (VOLTAREN) 1 % gel gel Apply  4 g topically to the appropriate area as directed 4 (Four) Times a Day. (Patient not taking: Reported on 6/28/2023) 350 g 2    fluticasone (FLONASE) 50 MCG/ACT nasal spray USE 2 SPRAYS IN EACH NOSTRIL ONE TIME DAILY AS DIRECTED (Patient not taking: Reported on 7/12/2023) 48 g 2    Magnesium 500 MG capsule Take  by mouth. (Patient not taking: Reported on 7/12/2023)      vitamin B-12 (CYANOCOBALAMIN) 1000 MCG tablet Take 1 tablet by mouth Daily. (Patient not taking: Reported on 9/12/2023)      vitamin E 1000 UNIT capsule Take 1 capsule by mouth Daily. (Patient not taking: Reported on 9/12/2023)       No facility-administered medications prior to visit.       No opioid medication identified on active medication list. I have reviewed chart for other potential  high risk medication/s and harmful drug interactions in the elderly.        Aspirin is on active medication list. Aspirin use is indicated based on review of current medical condition/s. Pros and cons of this therapy have been discussed today. Benefits of this medication outweigh potential harm.  Patient has been encouraged to continue taking this medication.  .      Patient Active Problem List   Diagnosis    Hyperlipidemia    Hyperglycemia    Hypertension    Urinary retention    Urethral cyst    HTN, white coat    Menopausal vaginal dryness    Osteopenia of left hip    Hearing loss due to cerumen impaction, bilateral    Injury of nail bed of toe    Acute URI    Observed seizure-like activity    Memory impairment    Left leg swelling     Advance Care Planning   Advance Care Planning     Advance Directive is not on file.  ACP discussion was held with the patient during this visit. Patient has an advance directive (not in EMR), copy requested.     Objective    Vitals:    10/13/23 1111   BP: (!) 152/104   BP Location: Left arm   Patient Position: Sitting   Cuff Size: Adult   Pulse: 81   Temp: 97.5 øF (36.4 øC)   TempSrc: Infrared   SpO2: 99%   Weight: 59.3 kg (130 lb  "12.8 oz)   Height: 162.6 cm (64\")     Estimated body mass index is 22.45 kg/mý as calculated from the following:    Height as of this encounter: 162.6 cm (64\").    Weight as of this encounter: 59.3 kg (130 lb 12.8 oz).    BMI is within normal parameters. No other follow-up for BMI required.      Does the patient have evidence of cognitive impairment? Yes    Lab Results   Component Value Date    CHLPL 223 (H) 10/06/2023    TRIG 83 10/06/2023    HDL 80 10/06/2023     (H) 10/06/2023    VLDL 14 10/06/2023    HGBA1C 5.6 10/06/2023        HEALTH RISK ASSESSMENT    Smoking Status:  Social History     Tobacco Use   Smoking Status Former    Packs/day: 0.50    Years: 5.00    Additional pack years: 0.00    Total pack years: 2.50    Types: Cigarettes    Quit date:     Years since quittin.8    Passive exposure: Never   Smokeless Tobacco Never     Alcohol Consumption:  Social History     Substance and Sexual Activity   Alcohol Use No     Fall Risk Screen:    STEADI Fall Risk Assessment was completed, and patient is at LOW risk for falls.Assessment completed on:10/13/2023    Depression Screening:      10/13/2023    11:15 AM   PHQ-2/PHQ-9 Depression Screening   Little Interest or Pleasure in Doing Things 0-->not at all   Feeling Down, Depressed or Hopeless 0-->not at all   PHQ-9: Brief Depression Severity Measure Score 0       Health Habits and Functional and Cognitive Screening:      10/13/2023    11:13 AM   Functional & Cognitive Status   Do you have difficulty preparing food and eating? No   Do you have difficulty bathing yourself, getting dressed or grooming yourself? No   Do you have difficulty using the toilet? No   Do you have difficulty moving around from place to place? No   Do you have trouble with steps or getting out of a bed or a chair? No   Current Diet Well Balanced Diet   Dental Exam Up to date   Eye Exam Up to date   Exercise (times per week) 7 times per week   Current Exercises Include Walking   Do " you need help using the phone?  No   Are you deaf or do you have serious difficulty hearing?  Yes   Do you need help to go to places out of walking distance? No   Do you need help shopping? No   Do you need help preparing meals?  No   Do you need help with housework?  No   Do you need help with laundry? No   Do you need help taking your medications? No   Do you need help managing money? No   Do you ever drive or ride in a car without wearing a seat belt? No       Age-appropriate Screening Schedule:  Refer to the list below for future screening recommendations based on patient's age, sex and/or medical conditions. Orders for these recommended tests are listed in the plan section. The patient has been provided with a written plan.    Health Maintenance   Topic Date Due    HEPATITIS C SCREENING  Never done    MAMMOGRAM  08/25/2023    ZOSTER VACCINE (2 of 2) 10/13/2023 (Originally 2/7/2018)    COVID-19 Vaccine (1) 10/15/2023 (Originally 4/10/1946)    INFLUENZA VACCINE  03/31/2024 (Originally 8/1/2023)    DXA SCAN  08/25/2024    LIPID PANEL  10/06/2024    ANNUAL WELLNESS VISIT  10/13/2024    TDAP/TD VACCINES (2 - Td or Tdap) 08/07/2025    COLORECTAL CANCER SCREENING  12/13/2027    Pneumococcal Vaccine 65+  Addressed                  CMS Preventative Services Quick Reference  Risk Factors Identified During Encounter  Immunizations Discussed/Encouraged: Influenza, Shingrix, and COVID19  Inadequate Social Support, Isolation, Loneliness, Lack of Transportation, Financial Difficulties, or Caregiver Stress:  is caregiver.  Pt not driving  Polypharmacy: Medication List reviewed and Medications are appropriate for patient  Pt needs to take meds regularly and may require more supervision with meds    The above risks/problems have been discussed with the patient.  Pertinent information has been shared with the patient in the After Visit Summary.  An After Visit Summary and PPPS were made available to the patient.    Follow  "Up:   Next Medicare Wellness visit to be scheduled in 1 year.       Additional E&M Note during same encounter follows:  Patient has multiple medical problems which are significant and separately identifiable that require additional work above and beyond the Medicare Wellness Visit.      Chief Complaint  Medicare Wellness-subsequent    Subjective        HPI  Adali Hankins is also being seen today for   Pt here with her  who helps provide history.     HTN - pt is supposed to be on metoprolol, lisinopril and hctz.  bp up today in clinic.  Bp was normal last night.  She quit her hctz bc she didn't think it helped her pee more.  Her  fixes her pill box but she doesn't always take everything in the box.  No ha/dizziness.    HLD - on statin.  No cramps or myalgias.    Wbc count low on labs - this has happened transiently in the past.     Review of Systems   Constitutional: Negative.    HENT: Negative.     Eyes: Negative.    Respiratory: Negative.     Cardiovascular: Negative.    Gastrointestinal: Negative.    Endocrine: Negative.    Musculoskeletal: Negative.    Skin: Negative.    Allergic/Immunologic: Negative.    Neurological: Negative.    Hematological: Negative.    Psychiatric/Behavioral: Negative.     All other systems reviewed and are negative.      Objective   Vital Signs:  BP (!) 152/104 (BP Location: Left arm, Patient Position: Sitting, Cuff Size: Adult)   Pulse 81   Temp 97.5 øF (36.4 øC) (Infrared)   Ht 162.6 cm (64\")   Wt 59.3 kg (130 lb 12.8 oz)   SpO2 99%   BMI 22.45 kg/mý     Physical Exam  Vitals and nursing note reviewed.   Constitutional:       General: She is not in acute distress.     Appearance: She is well-developed.   HENT:      Head: Normocephalic and atraumatic.      Right Ear: External ear normal.      Left Ear: External ear normal.      Nose: Nose normal.   Eyes:      Conjunctiva/sclera: Conjunctivae normal.      Pupils: Pupils are equal, round, and reactive to light. "   Cardiovascular:      Rate and Rhythm: Normal rate and regular rhythm.      Heart sounds: Normal heart sounds.   Pulmonary:      Effort: Pulmonary effort is normal. No respiratory distress.      Breath sounds: Normal breath sounds. No wheezing.   Musculoskeletal:         General: Normal range of motion.      Cervical back: Normal range of motion and neck supple.      Comments: Normal gait   Skin:     General: Skin is warm and dry.   Neurological:      General: No focal deficit present.      Mental Status: She is alert and oriented to person, place, and time.   Psychiatric:         Mood and Affect: Mood normal.         Behavior: Behavior normal.         Thought Content: Thought content normal.         Judgment: Judgment normal.                         Assessment and Plan   Diagnoses and all orders for this visit:    1. Medicare annual wellness visit, subsequent (Primary)  -     Hepatitis C antibody; Future    2. Breast cancer screening by mammogram  -     Mammo Screening Digital Tomosynthesis Bilateral With CAD; Future    3. Essential hypertension - pt not taking all meds regularly.  Reviewed meds and encouraged her to take all meds daily  -     CBC & Differential; Future  -     Comprehensive Metabolic Panel; Future    4. Hyperlipidemia, unspecified hyperlipidemia type  -     CBC & Differential; Future  -     Comprehensive Metabolic Panel; Future  -     Hemoglobin A1c; Future  -     Lipid Panel With LDL / HDL Ratio; Future    5. Moderate vascular dementia without behavioral disturbance, psychotic disturbance, mood disturbance, or anxiety  -     CBC & Differential; Future  -     Comprehensive Metabolic Panel; Future  -     Hemoglobin A1c; Future  -     Lipid Panel With LDL / HDL Ratio; Future    6. Episodes of staring- family given number to call to schedule EEG.  Encouraged f/u appt with neurology.  Neurology number also given.    7. Hyperglycemia  -     CBC & Differential; Future  -     Comprehensive Metabolic  Panel; Future  -     Hemoglobin A1c; Future  -     Lipid Panel With LDL / HDL Ratio; Future    8. Healthcare maintenance - Discussed preventive health care issues including healthy diet, exercise, cancer screening, immunizations, and preventive care.  Hm tab updated.  Encouraged seat belt use.  No texting while driving.            Follow Up   Return in about 6 months (around 4/13/2024) for Recheck, labs.  Patient was given instructions and counseling regarding her condition or for health maintenance advice. Please see specific information pulled into the AVS if appropriate.

## 2023-11-02 ENCOUNTER — PATIENT MESSAGE (OUTPATIENT)
Dept: INTERNAL MEDICINE | Facility: CLINIC | Age: 78
End: 2023-11-02
Payer: MEDICARE

## 2023-11-02 DIAGNOSIS — I10 ESSENTIAL HYPERTENSION: ICD-10-CM

## 2023-11-02 RX ORDER — METOPROLOL SUCCINATE 25 MG/1
50 TABLET, EXTENDED RELEASE ORAL DAILY
Status: CANCELLED
Start: 2023-11-02

## 2023-11-02 NOTE — TELEPHONE ENCOUNTER
Sent pt a message. Pt should have refills available at pharmacy.    Rx Refill Note  Requested Prescriptions      No prescriptions requested or ordered in this encounter      Last office visit with prescribing clinician: 10/13/2023   Last telemedicine visit with prescribing clinician: Visit date not found   Next office visit with prescribing clinician: 4/12/2024                         Would you like a call back once the refill request has been completed: [] Yes [] No    If the office needs to give you a call back, can they leave a voicemail: [] Yes [] No    Mary Warner, PCT  11/02/23, 14:34 EDT

## 2023-11-02 NOTE — TELEPHONE ENCOUNTER
Rx Refill Note  Requested Prescriptions     Pending Prescriptions Disp Refills    metoprolol succinate XL (TOPROL-XL) 25 MG 24 hr tablet       Sig: Take 2 tablets by mouth Daily.      Last office visit with prescribing clinician: 10/13/2023   Last telemedicine visit with prescribing clinician: Visit date not found   Next office visit with prescribing clinician: 4/12/2024                         Would you like a call back once the refill request has been completed: [] Yes [] No    If the office needs to give you a call back, can they leave a voicemail: [] Yes [] No    Mary Warner, PCT  11/02/23, 14:32 EDT

## 2023-11-02 NOTE — TELEPHONE ENCOUNTER
"Caller: Adali Hankins \"Reuben\"    Relationship: Self    Best call back number: [phone]    Requested Prescriptions:   Requested Prescriptions     Pending Prescriptions Disp Refills    metoprolol succinate XL (TOPROL-XL) 25 MG 24 hr tablet       Sig: Take 2 tablets by mouth Daily.        Pharmacy where request should be sent: [facility] [id number] [address] - [phone] PH - [phone] FX     Last office visit with prescribing clinician: 10/13/2023   Last telemedicine visit with prescribing clinician: Visit date not found   Next office visit with prescribing clinician: 4/12/2024     Additional details provided by patient:     Does the patient have less than a 3 day supply:  [x] Yes  [] No    Would you like a call back once the refill request has been completed: [x] Yes [] No    If the office needs to give you a call back, can they leave a voicemail: [x] Yes [] No    Milagros Sanabria Rep   11/02/23 11:01 EDT   "

## 2023-11-03 DIAGNOSIS — I10 ESSENTIAL HYPERTENSION: ICD-10-CM

## 2023-11-03 RX ORDER — METOPROLOL SUCCINATE 25 MG/1
50 TABLET, EXTENDED RELEASE ORAL DAILY
Qty: 30 TABLET | Refills: 0 | Status: SHIPPED | OUTPATIENT
Start: 2023-11-03

## 2023-11-03 NOTE — TELEPHONE ENCOUNTER
Last script sent was set to no print. Pt needs 30 day sent to [facility] as she is almost out of Metoprolol.     Rx Refill Note  Requested Prescriptions     Pending Prescriptions Disp Refills    metoprolol succinate XL (TOPROL-XL) 25 MG 24 hr tablet 30 tablet 0     Sig: Take 2 tablets by mouth Daily.      Last office visit with prescribing clinician: 10/13/2023   Last telemedicine visit with prescribing clinician: Visit date not found   Next office visit with prescribing clinician: 4/12/2024                         Would you like a call back once the refill request has been completed: [] Yes [] No    If the office needs to give you a call back, can they leave a voicemail: [] Yes [] No    Mary Warner, PCT  11/03/23, 11:24 EDT

## 2023-11-07 ENCOUNTER — HOSPITAL ENCOUNTER (OUTPATIENT)
Dept: MAMMOGRAPHY | Facility: HOSPITAL | Age: 78
Discharge: HOME | End: 2023-11-07
Admitting: INTERNAL MEDICINE
Payer: MEDICARE

## 2023-11-07 DIAGNOSIS — I10 ESSENTIAL HYPERTENSION: ICD-10-CM

## 2023-11-07 DIAGNOSIS — Z12.31 BREAST CANCER SCREENING BY MAMMOGRAM: ICD-10-CM

## 2023-11-07 PROCEDURE — 77067 SCR MAMMO BI INCL CAD: CPT

## 2023-11-07 PROCEDURE — 77063 BREAST TOMOSYNTHESIS BI: CPT

## 2023-11-08 ENCOUNTER — TELEPHONE (OUTPATIENT)
Dept: INTERNAL MEDICINE | Facility: CLINIC | Age: 78
End: 2023-11-08
Payer: MEDICARE

## 2023-11-08 DIAGNOSIS — I10 ESSENTIAL HYPERTENSION: ICD-10-CM

## 2023-11-08 RX ORDER — LISINOPRIL 20 MG/1
40 TABLET ORAL DAILY
Qty: 180 TABLET | Refills: 10 | Status: SHIPPED | OUTPATIENT
Start: 2023-11-08

## 2023-11-08 RX ORDER — METOPROLOL SUCCINATE 25 MG/1
50 TABLET, EXTENDED RELEASE ORAL DAILY
Qty: 180 TABLET | Refills: 1 | Status: SHIPPED | OUTPATIENT
Start: 2023-11-08

## 2023-11-08 NOTE — TELEPHONE ENCOUNTER
"----- Message from Sydnie Alfonso MA sent at 11/7/2023  8:08 AM EST -----  Regarding: FW: Refills  Contact: [phone]        ----- Message -----  From: Adali Hankins \"Reuben\"  Sent: 11/6/2023   2:25 PM EST  To: [facility]adio Diaz [facility]  Subject: Refills                                          Yes I do have refills through [employer] mail order but the problem is they are for 1X per day therefore the prescription only has 90 pills.   Dr Diaz changed this medication quite some time ago that I am suppose to take this one 2X a day. I went to [facility] pharmacy when she changed it because it takes some time  to change things through the mail order . Dr Diaz's office never changed the prescription to 180 pills through the mail order so now I am out again. This is what happened last time as well.   "

## 2023-11-09 ENCOUNTER — TELEPHONE (OUTPATIENT)
Dept: NEUROLOGY | Facility: CLINIC | Age: 78
End: 2023-11-09
Payer: MEDICARE

## 2023-11-09 NOTE — TELEPHONE ENCOUNTER
Spoke to Mrs Hankins about her [employer] Medicare Insurance, she Has HMO and they do not have any OON Benefirts , she will talk to her daughter about switching plans and call back to reschedule

## 2023-11-22 DIAGNOSIS — R41.3 MEMORY IMPAIRMENT: ICD-10-CM

## 2023-11-22 RX ORDER — DONEPEZIL HYDROCHLORIDE 5 MG/1
5 TABLET, FILM COATED ORAL DAILY
Qty: 90 TABLET | Refills: 3 | Status: SHIPPED | OUTPATIENT
Start: 2023-11-22

## 2024-01-16 DIAGNOSIS — R41.3 MEMORY IMPAIRMENT: ICD-10-CM

## 2024-01-16 DIAGNOSIS — E78.5 HYPERLIPIDEMIA, UNSPECIFIED HYPERLIPIDEMIA TYPE: ICD-10-CM

## 2024-01-16 DIAGNOSIS — I10 ESSENTIAL HYPERTENSION: ICD-10-CM

## 2024-01-16 RX ORDER — LISINOPRIL 20 MG/1
40 TABLET ORAL DAILY
Qty: 180 TABLET | Refills: 10 | Status: SHIPPED | OUTPATIENT
Start: 2024-01-16

## 2024-01-16 RX ORDER — DONEPEZIL HYDROCHLORIDE 5 MG/1
5 TABLET, FILM COATED ORAL DAILY
Qty: 90 TABLET | Refills: 3 | Status: SHIPPED | OUTPATIENT
Start: 2024-01-16

## 2024-01-16 RX ORDER — ROSUVASTATIN CALCIUM 5 MG/1
5 TABLET, COATED ORAL DAILY
Qty: 90 TABLET | Refills: 3 | Status: SHIPPED | OUTPATIENT
Start: 2024-01-16

## 2024-01-16 RX ORDER — METOPROLOL SUCCINATE 25 MG/1
50 TABLET, EXTENDED RELEASE ORAL DAILY
Qty: 180 TABLET | Refills: 1 | Status: SHIPPED | OUTPATIENT
Start: 2024-01-16

## 2024-01-16 RX ORDER — HYDROCHLOROTHIAZIDE 25 MG/1
25 TABLET ORAL DAILY
Qty: 90 TABLET | Refills: 0 | Status: SHIPPED | OUTPATIENT
Start: 2024-01-16

## 2024-03-20 DIAGNOSIS — Z00.00 MEDICARE ANNUAL WELLNESS VISIT, SUBSEQUENT: ICD-10-CM

## 2024-03-20 DIAGNOSIS — E78.5 HYPERLIPIDEMIA, UNSPECIFIED HYPERLIPIDEMIA TYPE: ICD-10-CM

## 2024-03-20 DIAGNOSIS — F01.B0 MODERATE VASCULAR DEMENTIA WITHOUT BEHAVIORAL DISTURBANCE, PSYCHOTIC DISTURBANCE, MOOD DISTURBANCE, OR ANXIETY: ICD-10-CM

## 2024-03-20 DIAGNOSIS — I10 ESSENTIAL HYPERTENSION: ICD-10-CM

## 2024-03-20 DIAGNOSIS — R73.9 HYPERGLYCEMIA: ICD-10-CM

## 2024-04-09 LAB
ALBUMIN SERPL-MCNC: 4.6 G/DL (ref 3.5–5.2)
ALBUMIN/GLOB SERPL: 2 G/DL
ALP SERPL-CCNC: 57 U/L (ref 39–117)
ALT SERPL-CCNC: 17 U/L (ref 1–33)
AST SERPL-CCNC: 25 U/L (ref 1–32)
BASOPHILS # BLD AUTO: 0.02 10*3/MM3 (ref 0–0.2)
BASOPHILS NFR BLD AUTO: 0.7 % (ref 0–1.5)
BILIRUB SERPL-MCNC: 0.9 MG/DL (ref 0–1.2)
BUN SERPL-MCNC: 18 MG/DL (ref 8–23)
BUN/CREAT SERPL: 15.3 (ref 7–25)
CALCIUM SERPL-MCNC: 10.3 MG/DL (ref 8.6–10.5)
CHLORIDE SERPL-SCNC: 94 MMOL/L (ref 98–107)
CHOLEST SERPL-MCNC: 212 MG/DL (ref 0–200)
CO2 SERPL-SCNC: 30.5 MMOL/L (ref 22–29)
CREAT SERPL-MCNC: 1.18 MG/DL (ref 0.57–1)
EGFRCR SERPLBLD CKD-EPI 2021: 47.1 ML/MIN/1.73
EOSINOPHIL # BLD AUTO: 0.02 10*3/MM3 (ref 0–0.4)
EOSINOPHIL NFR BLD AUTO: 0.7 % (ref 0.3–6.2)
ERYTHROCYTE [DISTWIDTH] IN BLOOD BY AUTOMATED COUNT: 12.3 % (ref 12.3–15.4)
GLOBULIN SER CALC-MCNC: 2.3 GM/DL
GLUCOSE SERPL-MCNC: 102 MG/DL (ref 65–99)
HBA1C MFR BLD: 5.3 % (ref 4.8–5.6)
HCT VFR BLD AUTO: 36.2 % (ref 34–46.6)
HCV IGG SERPL QL IA: NON REACTIVE
HDLC SERPL-MCNC: 79 MG/DL (ref 40–60)
HGB BLD-MCNC: 12.3 G/DL (ref 12–15.9)
IMM GRANULOCYTES # BLD AUTO: 0.01 10*3/MM3 (ref 0–0.05)
IMM GRANULOCYTES NFR BLD AUTO: 0.3 % (ref 0–0.5)
LDLC SERPL CALC-MCNC: 117 MG/DL (ref 0–100)
LDLC/HDLC SERPL: 1.45 {RATIO}
LYMPHOCYTES # BLD AUTO: 1 10*3/MM3 (ref 0.7–3.1)
LYMPHOCYTES NFR BLD AUTO: 33.2 % (ref 19.6–45.3)
MCH RBC QN AUTO: 31.5 PG (ref 26.6–33)
MCHC RBC AUTO-ENTMCNC: 34 G/DL (ref 31.5–35.7)
MCV RBC AUTO: 92.8 FL (ref 79–97)
MONOCYTES # BLD AUTO: 0.41 10*3/MM3 (ref 0.1–0.9)
MONOCYTES NFR BLD AUTO: 13.6 % (ref 5–12)
NEUTROPHILS # BLD AUTO: 1.55 10*3/MM3 (ref 1.7–7)
NEUTROPHILS NFR BLD AUTO: 51.5 % (ref 42.7–76)
NRBC BLD AUTO-RTO: 0 /100 WBC (ref 0–0.2)
PLATELET # BLD AUTO: 168 10*3/MM3 (ref 140–450)
POTASSIUM SERPL-SCNC: 4.7 MMOL/L (ref 3.5–5.2)
PROT SERPL-MCNC: 6.9 G/DL (ref 6–8.5)
RBC # BLD AUTO: 3.9 10*6/MM3 (ref 3.77–5.28)
SODIUM SERPL-SCNC: 136 MMOL/L (ref 136–145)
TRIGL SERPL-MCNC: 91 MG/DL (ref 0–150)
VLDLC SERPL CALC-MCNC: 16 MG/DL (ref 5–40)
WBC # BLD AUTO: 3.01 10*3/MM3 (ref 3.4–10.8)

## 2024-04-12 ENCOUNTER — OFFICE VISIT (OUTPATIENT)
Dept: INTERNAL MEDICINE | Facility: CLINIC | Age: 79
End: 2024-04-12
Payer: MEDICARE

## 2024-04-12 VITALS
SYSTOLIC BLOOD PRESSURE: 148 MMHG | OXYGEN SATURATION: 97 % | HEIGHT: 64 IN | WEIGHT: 139 LBS | TEMPERATURE: 98 F | DIASTOLIC BLOOD PRESSURE: 78 MMHG | HEART RATE: 67 BPM | BODY MASS INDEX: 23.73 KG/M2

## 2024-04-12 DIAGNOSIS — R79.89 ELEVATED SERUM CREATININE: ICD-10-CM

## 2024-04-12 DIAGNOSIS — F01.B0 MODERATE VASCULAR DEMENTIA WITHOUT BEHAVIORAL DISTURBANCE, PSYCHOTIC DISTURBANCE, MOOD DISTURBANCE, OR ANXIETY: ICD-10-CM

## 2024-04-12 DIAGNOSIS — I10 ESSENTIAL HYPERTENSION: Primary | ICD-10-CM

## 2024-04-12 DIAGNOSIS — E78.5 HYPERLIPIDEMIA, UNSPECIFIED HYPERLIPIDEMIA TYPE: ICD-10-CM

## 2024-04-12 LAB
BILIRUB BLD-MCNC: NEGATIVE MG/DL
CLARITY, POC: CLEAR
COLOR UR: YELLOW
EXPIRATION DATE: ABNORMAL
GLUCOSE UR STRIP-MCNC: NEGATIVE MG/DL
KETONES UR QL: NEGATIVE
LEUKOCYTE EST, POC: ABNORMAL
Lab: ABNORMAL
NITRITE UR-MCNC: NEGATIVE MG/ML
PH UR: 5.5 [PH] (ref 5–8)
PROT UR STRIP-MCNC: NEGATIVE MG/DL
RBC # UR STRIP: ABNORMAL /UL
SP GR UR: 1 (ref 1–1.03)
UROBILINOGEN UR QL: ABNORMAL

## 2024-04-12 PROCEDURE — 1160F RVW MEDS BY RX/DR IN RCRD: CPT | Performed by: INTERNAL MEDICINE

## 2024-04-12 PROCEDURE — 3077F SYST BP >= 140 MM HG: CPT | Performed by: INTERNAL MEDICINE

## 2024-04-12 PROCEDURE — 3078F DIAST BP <80 MM HG: CPT | Performed by: INTERNAL MEDICINE

## 2024-04-12 PROCEDURE — 81003 URINALYSIS AUTO W/O SCOPE: CPT | Performed by: INTERNAL MEDICINE

## 2024-04-12 PROCEDURE — 1159F MED LIST DOCD IN RCRD: CPT | Performed by: INTERNAL MEDICINE

## 2024-04-12 RX ORDER — ASCORBIC ACID 500 MG
500 TABLET ORAL DAILY
COMMUNITY

## 2024-04-12 RX ORDER — MULTIPLE VITAMINS W/ MINERALS TAB 9MG-400MCG
1 TAB ORAL DAILY
COMMUNITY

## 2024-04-12 NOTE — PROGRESS NOTES
Adali Hankins is a 78 y.o. female, who presents with a chief complaint of   Chief Complaint   Patient presents with   • Hypertension     6 month follow up           Hypertension     Pt here with her  who helps provide history.      HTN - pt is supposed to be on metoprolol, hctz, and lisinopril.  bp up today in clinic but better than last time.  Bp has been in the 130 range at home.  She forgot her bp log at home.  No ha/dizziness.     HLD - on statin.  No cramps or myalgias.     Creatinine going up.  She is not drinking much water.  She is on hctz and lisinopril.  She says her urine is sometimes dark in the morning but usually normal.  She is taking an azo supplement daily.     Wbc count low on labs - this has happened transiently in the past.     Cognitive impairment - she is reading, doing puzzles and trying to walk.  She feels like she is doing fairly well. She is on aricept per neurology.     The following portions of the patient's history were reviewed and updated as appropriate: allergies, current medications, past family history, past medical history, past social history, past surgical history and problem list.    Allergies: Patient has no known allergies.    Review of Systems   Constitutional: Negative.    HENT: Negative.     Eyes: Negative.    Respiratory: Negative.     Cardiovascular: Negative.    Gastrointestinal: Negative.    Endocrine: Negative.    Genitourinary: Negative.    Musculoskeletal: Negative.    Skin: Negative.    Allergic/Immunologic: Negative.    Neurological: Negative.    Hematological: Negative.    Psychiatric/Behavioral: Negative.     All other systems reviewed and are negative.            Wt Readings from Last 3 Encounters:   04/12/24 63 kg (139 lb)   10/13/23 59.3 kg (130 lb 12.8 oz)   09/12/23 59.7 kg (131 lb 9.6 oz)     Temp Readings from Last 3 Encounters:   04/12/24 98 °F (36.7 °C) (Infrared)   10/13/23 97.5 °F (36.4 °C) (Infrared)   09/12/23 97.7 °F (36.5 °C)  (Infrared)     BP Readings from Last 3 Encounters:   04/12/24 148/78   10/13/23 (!) 152/104   09/12/23 138/80     Pulse Readings from Last 3 Encounters:   04/12/24 67   10/13/23 81   09/12/23 69     Body mass index is 23.86 kg/m².  SpO2 Readings from Last 3 Encounters:   04/12/24 97%   10/13/23 99%   09/12/23 98%          Physical Exam  Vitals and nursing note reviewed.   Constitutional:       General: She is not in acute distress.     Appearance: She is well-developed.   HENT:      Head: Normocephalic and atraumatic.      Right Ear: External ear normal. There is impacted cerumen.      Left Ear: External ear normal. There is impacted cerumen.      Nose: Nose normal.   Eyes:      Conjunctiva/sclera: Conjunctivae normal.      Pupils: Pupils are equal, round, and reactive to light.   Cardiovascular:      Rate and Rhythm: Normal rate and regular rhythm.      Heart sounds: Normal heart sounds.   Pulmonary:      Effort: Pulmonary effort is normal. No respiratory distress.      Breath sounds: Normal breath sounds. No wheezing.   Musculoskeletal:         General: Normal range of motion.      Cervical back: Normal range of motion and neck supple.      Comments: Normal gait   Skin:     General: Skin is warm and dry.   Neurological:      Mental Status: She is alert and oriented to person, place, and time. Mental status is at baseline.   Psychiatric:         Mood and Affect: Mood normal.         Behavior: Behavior normal.         Thought Content: Thought content normal.         Judgment: Judgment normal.       Results for orders placed or performed in visit on 04/12/24   POCT urinalysis dipstick, automated    Specimen: Urine   Result Value Ref Range    Color Yellow Yellow, Straw, Dark Yellow, Adriane    Clarity, UA Clear Clear    Specific Gravity  1.005 1.005 - 1.030    pH, Urine 5.5 5.0 - 8.0    Leukocytes Small (1+) (A) Negative    Nitrite, UA Negative Negative    Protein, POC Negative Negative mg/dL    Glucose, UA Negative  Negative mg/dL    Ketones, UA Negative Negative    Urobilinogen, UA 0.2 E.U./dL Normal, 0.2 E.U./dL    Bilirubin Negative Negative    Blood, UA Trace (A) Negative    Lot Number [id number]     Expiration Date 1/14/25      Result Review :                  Assessment and Plan    Diagnoses and all orders for this visit:    1. Essential hypertension (Primary) - pt on metoprolol, hctz, and lisinopril.  Bp fairly well controlled but serum creatinine going up.      2. Hyperlipidemia, unspecified hyperlipidemia type    3. Moderate vascular dementia without behavioral disturbance, psychotic disturbance, mood disturbance, or anxiety    4. Elevated serum creatinine - stop hctz.  Push fluids.    -     CBC & Differential; Future  -     Comprehensive Metabolic Panel; Future  -     POCT urinalysis dipstick, automated         BMI is within normal parameters. No other follow-up for BMI required.             Outpatient Medications Prior to Visit   Medication Sig Dispense Refill   • ascorbic acid (VITAMIN C) 500 MG tablet Take 1 tablet by mouth Daily.     • aspirin 81 MG tablet Take 1 tablet by mouth Daily.     • CALCIUM CITRATE-VITAMIN D PO      • Cranberry-Vitamin C (AZO CRANBERRY URINARY TRACT PO) Take  by mouth 2 (Two) Times a Day.     • Diclofenac Sodium (VOLTAREN) 1 % gel gel Apply 4 g topically to the appropriate area as directed 4 (Four) Times a Day. 350 g 2   • donepezil (ARICEPT) 5 MG tablet Take 1 tablet by mouth Daily. 90 tablet 3   • Ibuprofen 200 MG capsule As Needed.     • lisinopril (PRINIVIL,ZESTRIL) 20 MG tablet Take 2 tablets by mouth Daily. 180 tablet 10   • Magnesium 500 MG capsule Take  by mouth.     • metoprolol succinate XL (TOPROL-XL) 25 MG 24 hr tablet Take 2 tablets by mouth Daily. 180 tablet 1   • multivitamin with minerals tablet tablet Take 1 tablet by mouth Daily.     • rosuvastatin (CRESTOR) 5 MG tablet Take 1 tablet by mouth Daily. 90 tablet 3   • hydroCHLOROthiazide (HYDRODIURIL) 25 MG tablet Take  1 tablet by mouth Daily. 90 tablet 0   • coenzyme Q10 100 MG capsule Take 1 capsule by mouth Daily.     • fluticasone (FLONASE) 50 MCG/ACT nasal spray USE 2 SPRAYS IN EACH NOSTRIL ONE TIME DAILY AS DIRECTED 48 g 2   • ondansetron ODT (ZOFRAN-ODT) 8 MG disintegrating tablet Place 1 tablet on the tongue Every 8 (Eight) Hours As Needed for Nausea or Vomiting. 12 tablet 0   • vitamin B-12 (CYANOCOBALAMIN) 1000 MCG tablet Take 1 tablet by mouth Daily.     • vitamin E 1000 UNIT capsule Take 1 capsule by mouth Daily.       No facility-administered medications prior to visit.     No orders of the defined types were placed in this encounter.    [unfilled]  Medications Discontinued During This Encounter   Medication Reason   • fluticasone (FLONASE) 50 MCG/ACT nasal spray *Therapy completed   • ondansetron ODT (ZOFRAN-ODT) 8 MG disintegrating tablet *Therapy completed   • hydroCHLOROthiazide (HYDRODIURIL) 25 MG tablet *Therapy completed   • vitamin E 1000 UNIT capsule *Therapy completed   • vitamin B-12 (CYANOCOBALAMIN) 1000 MCG tablet *Therapy completed   • coenzyme Q10 100 MG capsule          Return in about 3 months (around 7/12/2024) for Recheck.    Patient was given instructions and counseling regarding her condition or for health maintenance advice. Please see specific information pulled into the AVS if appropriate.

## 2024-06-10 ENCOUNTER — TELEPHONE (OUTPATIENT)
Dept: NEUROLOGY | Facility: CLINIC | Age: 79
End: 2024-06-10
Payer: MEDICARE

## 2024-06-10 NOTE — TELEPHONE ENCOUNTER
Provider: DR BAEZA    Caller: PATIENT    Relationship to Patient: SELF    Phone Number: [phone]    Reason for Call: SENDING ENCOUNTER PER PROTOCOL TO ADVISE THAT PATIENT CANCELED FOLLOW UP APPT FOR DEMENTIA. STATES SHE DID NOT FEEL APPT WAS NEEDED. THANK YOU.

## 2024-07-17 DIAGNOSIS — I10 ESSENTIAL HYPERTENSION: ICD-10-CM

## 2024-07-17 RX ORDER — METOPROLOL SUCCINATE 25 MG/1
50 TABLET, EXTENDED RELEASE ORAL DAILY
Qty: 180 TABLET | Refills: 1 | Status: SHIPPED | OUTPATIENT
Start: 2024-07-17

## 2024-09-04 NOTE — TELEPHONE ENCOUNTER
Caller is lenin from [facility] in [facility]. States she is almost out of her lisinopril 40 mg and would like this called in to [facility] in [place]. Sees aleshia. States a 90 day supply was suppose to be sent to [facility] but they never received it. Can we send this after we send the 30 day to [facility]?   
PT script for 30 days sent in to [facility]. 90 day script sent to [facility] pharmacy per request.  
alert

## 2024-09-08 ENCOUNTER — HOSPITAL ENCOUNTER (EMERGENCY)
Facility: HOSPITAL | Age: 79
Discharge: HOME | End: 2024-09-08
Attending: STUDENT IN AN ORGANIZED HEALTH CARE EDUCATION/TRAINING PROGRAM | Admitting: STUDENT IN AN ORGANIZED HEALTH CARE EDUCATION/TRAINING PROGRAM
Payer: MEDICARE

## 2024-09-08 ENCOUNTER — APPOINTMENT (OUTPATIENT)
Dept: CT IMAGING | Facility: HOSPITAL | Age: 79
End: 2024-09-08
Payer: MEDICARE

## 2024-09-08 VITALS
DIASTOLIC BLOOD PRESSURE: 95 MMHG | SYSTOLIC BLOOD PRESSURE: 199 MMHG | TEMPERATURE: 98.5 F | WEIGHT: 140 LBS | OXYGEN SATURATION: 98 % | HEART RATE: 77 BPM | HEIGHT: 60 IN | RESPIRATION RATE: 18 BRPM | BODY MASS INDEX: 27.48 KG/M2

## 2024-09-08 DIAGNOSIS — I15.9 SECONDARY HYPERTENSION: ICD-10-CM
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
## 2024-09-08 DIAGNOSIS — S00.83XA CONTUSION OF FACE, INITIAL ENCOUNTER: ICD-10-CM

## 2024-09-08 PROCEDURE — 70450 CT HEAD/BRAIN W/O DYE: CPT

## 2024-09-08 PROCEDURE — 70486 CT MAXILLOFACIAL W/O DYE: CPT

## 2024-09-08 PROCEDURE — 99284 EMERGENCY DEPT VISIT MOD MDM: CPT | Performed by: STUDENT IN AN ORGANIZED HEALTH CARE EDUCATION/TRAINING PROGRAM

## 2024-09-08 RX ORDER — METOPROLOL TARTRATE 25 MG/1
25 TABLET, FILM COATED ORAL ONCE
Status: COMPLETED | OUTPATIENT
Start: 2024-09-08 | End: 2024-09-08

## 2024-09-08 RX ADMIN — METOPROLOL TARTRATE 25 MG: 25 TABLET, FILM COATED ORAL at 15:02

## 2024-09-08 NOTE — ED PROVIDER NOTES
Subjective   History of Present Illness    78-year-old female with possible history of memory loss, hypertension, hyperlipidemia presenting for fall.  Patient was leaving [employer] and walking on uneven sidewalk when she tripped causing herself to fall forward and hit her face.  Reports epistaxis after fall as well as nose pain which is now improved.  Also reports bleeding and swelling to her left upper and left lower lip.  No loss of consciousness.  No headache.  No nausea, vomiting, visual changes.  Denies any neck pain, back pain, extremity pain.  No chest pain, shortness of breath.  No lightheadedness.  Patient is not on any blood thinners.    Review of Systems    Past Medical History:   Diagnosis Date    Arthritis     Cataracts, bilateral     Environmental allergies     History of carcinoma in situ of cervix uteri     Hyperlipidemia     Hypertension     Hypoglycemia     Memory loss     TIA (transient ischemic attack)     Urethral cyst 2017    Vaginal prolapse        No Known Allergies    Past Surgical History:   Procedure Laterality Date    BREAST BIOPSY Left     abcess drained surgically    HERNIA REPAIR      TUBAL ABDOMINAL LIGATION         Family History   Problem Relation Age of Onset    Cancer Other         colon    Hypertension Other     Stroke Brother     Breast cancer Neg Hx        Social History     Socioeconomic History    Marital status:    Tobacco Use    Smoking status: Former     Current packs/day: 0.00     Average packs/day: 0.5 packs/day for 5.0 years (2.5 ttl pk-yrs)     Types: Cigarettes     Start date:      Quit date:      Years since quittin.7     Passive exposure: Never    Smokeless tobacco: Never   Vaping Use    Vaping status: Never Used   Substance and Sexual Activity    Alcohol use: No    Drug use: No    Sexual activity: Defer           Objective   Physical Exam  Vitals and nursing note reviewed.   Constitutional:       General: She is not in acute distress.  HENT:       Head: Normocephalic.      Nose:      Comments: Ecchymosis to nasal bridge, dried epistaxis to bilateral naris without active bleeding, no septal hematoma     Mouth/Throat:      Mouth: Mucous membranes are moist.      Comments: Ecchymosis and swelling to left upper lip with skin tear, oral surface of left upper lip does have contusion without significant laceration, ecchymosis and swelling to left lower lip, contusion to oral surface of left lower lip without significant laceration  Eyes:      Extraocular Movements: Extraocular movements intact.      Conjunctiva/sclera: Conjunctivae normal.      Pupils: Pupils are equal, round, and reactive to light.   Cardiovascular:      Rate and Rhythm: Normal rate and regular rhythm.      Pulses: Normal pulses.   Pulmonary:      Effort: Pulmonary effort is normal.      Breath sounds: Normal breath sounds.   Abdominal:      General: Abdomen is flat. There is no distension.      Palpations: Abdomen is soft.      Tenderness: There is no abdominal tenderness.   Musculoskeletal:         General: No swelling, tenderness, deformity or signs of injury. Normal range of motion.      Cervical back: Normal range of motion and neck supple. No tenderness.   Skin:     General: Skin is warm and dry.      Capillary Refill: Capillary refill takes less than 2 seconds.   Neurological:      Mental Status: She is alert.      Cranial Nerves: No cranial nerve deficit.      Sensory: No sensory deficit.      Motor: No weakness.      Coordination: Coordination normal.         Procedures           ED Course                                             Medical Decision Making  Problems Addressed:  Contusion of face, initial encounter: complicated acute illness or injury  Fall, initial encounter: complicated acute illness or injury  Secondary hypertension: complicated acute illness or injury    Amount and/or Complexity of Data Reviewed  Radiology: ordered.    Risk  Prescription drug management.      CT  head and facial bones negative for acute pathology.  Does have split skin to interservice of upper and lower left lips which are very shallow not amenable to closure, mild skin tear to left outer surface of upper lip again not amenable to closure.  No other injuries noted on exam.    Patient noted to be hypertensive with systolics over the 200s while in the emergency department.  Patient states that she does believe she took her morning blood pressure medication.  States that she normally has very high blood pressure when she is visiting the doctor due to severe whitecoat syndrome which was confirmed by the daughter.  Patient is reassuring neurologic exam and is otherwise asymptomatic with her hypertension.  Will give dose of metoprolol and reassess blood pressure.  Low suspicion for hypertensive urgency or emergency.    Final diagnoses:   Fall, initial encounter   Contusion of face, initial encounter   Secondary hypertension       ED Disposition  ED Disposition       ED Disposition   Discharge    Condition   Stable    Comment   --               Ana Luisa Diaz MD  [address]  [facility] [id number] [phone]      As needed         Medication List      No changes were made to your prescriptions during this visit.            Bessy Matt MD  09/08/24 [id number]

## 2024-11-15 ENCOUNTER — TRANSCRIBE ORDERS (OUTPATIENT)
Dept: ADMINISTRATIVE | Facility: HOSPITAL | Age: 79
End: 2024-11-15
Payer: MEDICARE

## 2024-11-15 DIAGNOSIS — Z12.31 VISIT FOR SCREENING MAMMOGRAM: Primary | ICD-10-CM

## 2024-12-18 NOTE — TELEPHONE ENCOUNTER
History     Chief Complaint   Patient presents with    Cough     HPI    History obtained from mother and sister.     Raisa is a(n) 6 year old, previously healthy boy who presents at 12:20 PM with cough.    Yesterday, Raisa was in his usual state of health when he developed cough. Cough worsened overnight and today. He had a tactile fever yesterday. No antipyretics given. He has been eating and drinking well. He has not had any rashes, vomiting or diarrhea, or dysuria. Work of breathing is normal, as is his energy level. Sister has been here with similar symptoms as well.     PMHx:  No past medical history on file.  No past surgical history on file.  These were reviewed with the patient/family.    MEDICATIONS were reviewed and are as follows:   No current facility-administered medications for this encounter.     Current Outpatient Medications   Medication Sig Dispense Refill    ibuprofen (ADVIL/MOTRIN) 100 MG/5ML suspension Take 4.5 mLs (90 mg) by mouth every 6 hours as needed for pain or fever 100 mL 0       ALLERGIES:  Patient has no known allergies.  IMMUNIZATIONS: UTD   SOCIAL HISTORY: lives at home with mom and 9 siblings      Physical Exam   Pulse: 88  Temp: 97.2  F (36.2  C)  Resp: 20  Weight: 20 kg (44 lb 1.5 oz)  SpO2: 100 %       Physical Exam  Appearance: Alert and appropriate, well developed, nontoxic, with moist mucous membranes.  HEENT: Head: Normocephalic and atraumatic. Eyes: PERRL, EOM grossly intact, conjunctivae and sclerae clear. Ears: Tympanic membranes clear bilaterally, without inflammation or effusion. Nose: Nares clear with no active discharge.  Mouth/Throat: No oral lesions, pharynx clear with no erythema or exudate.  Neck: Supple, no masses, no meningismus. No significant cervical lymphadenopathy.  Pulmonary: No grunting, flaring, retractions or stridor. Good air entry, clear to auscultation bilaterally, with no rales, rhonchi, or wheezing.  Cardiovascular: Regular rate and rhythm,  Caller: Adali Hankins    Relationship to patient: Self    Best call back number: [phone]    Chief complaint: LABS    Type of visit: LABS    Requested date: ANYTIME     If rescheduling, when is the original appointment: N/A     Additional notes:PLEASE ADVISE            normal S1 and S2, with no murmurs.  Normal symmetric peripheral pulses and brisk cap refill.  Abdominal: Soft, nontender, nondistended, with no masses and no hepatosplenomegaly.  Neurologic: Alert and oriented, cranial nerves II-XII grossly intact, moving all extremities equally with grossly normal coordination and normal gait.  Skin: No significant rashes, ecchymoses, or lacerations.    ED Course        Procedures    No results found for any visits on 12/18/24.    Medications - No data to display    Critical care time:  none        Medical Decision Making  The patient's presentation was of low complexity (an acute and uncomplicated illness or injury).    The patient's evaluation involved:  an assessment requiring an independent historian (Mom)  ordering and/or review of 1 test(s) in this encounter (RSV/flu/covid testing resulted positive for RSV.)    The patient's management necessitated only low risk treatment.      Assessment & Plan   Raisa is a(n) 6 year old previously healthy female here with two day history of cough. Vital signs stable here, afebrile. Patient is well appearing with benign examination. Discussed with family that symptoms are most likely secondary to viral illness. Discussed conservative management including antipyretics and pushing fluids to maintain hydration. Returned precautions discussed including persistent fever over 100.4F that does not resolve with antipyretics or lasts greater than five days, increased work of breathing, and decreased UOP. Viral testing sent and returned positive for RSV and mom informed.  Reviewed expected course and reviewed return precautions.  Discharged to home.    New Prescriptions    No medications on file       Final diagnoses:   Viral URI     Pravin Vargas MD  PGY3  [facility] Pediatric Residency    This data was collected with the resident physician working in the Emergency Department. I saw and evaluated the patient and repeated the key  portions of the history and physical exam. The plan of care has been discussed with the patient and family by me or by the resident under my supervision. I have read and edited the entire note. Sara Arrington MD    Portions of this note may have been created using voice recognition software. Please excuse transcription errors.     12/18/2024   [facility] EMERGENCY DEPARTMENT     Sara Arrington MD  12/18/24 [id number]

## 2025-01-02 DIAGNOSIS — I10 ESSENTIAL HYPERTENSION: ICD-10-CM

## 2025-01-02 NOTE — TELEPHONE ENCOUNTER
ACE Inhibitors Protocol [id number]/02/2025 03:55 AM   Protocol Details Blood pressure on record   Rx Refill Note  Requested Prescriptions     Pending Prescriptions Disp Refills    lisinopril (PRINIVIL,ZESTRIL) 20 MG tablet [Pharmacy Med Name: LISINOPRIL 20MG TABS] 180 tablet 10     Sig: TAKE TWO TABLETS BY MOUTH EVERY DAY      Last office visit with prescribing clinician: 4/12/2024   Last telemedicine visit with prescribing clinician: Visit date not found   Next office visit with prescribing clinician: Visit date not found                         Would you like a call back once the refill request has been completed: [] Yes [] No    If the office needs to give you a call back, can they leave a voicemail: [] Yes [] No    Mary Donaldson MA  01/02/25, 14:15 EST

## 2025-01-03 DIAGNOSIS — I10 ESSENTIAL HYPERTENSION: ICD-10-CM

## 2025-01-03 RX ORDER — LISINOPRIL 20 MG/1
40 TABLET ORAL DAILY
Qty: 30 TABLET | Refills: 0 | Status: SHIPPED | OUTPATIENT
Start: 2025-01-03

## 2025-01-06 RX ORDER — METOPROLOL SUCCINATE 25 MG/1
50 TABLET, EXTENDED RELEASE ORAL DAILY
Qty: 180 TABLET | Refills: 0 | Status: SHIPPED | OUTPATIENT
Start: 2025-01-06

## 2025-03-17 ENCOUNTER — HOSPITAL ENCOUNTER (OUTPATIENT)
Dept: MAMMOGRAPHY | Facility: HOSPITAL | Age: 80
Discharge: HOME | End: 2025-03-17
Admitting: INTERNAL MEDICINE
Payer: MEDICARE

## 2025-03-17 DIAGNOSIS — Z12.31 VISIT FOR SCREENING MAMMOGRAM: ICD-10-CM

## 2025-03-17 PROCEDURE — 77067 SCR MAMMO BI INCL CAD: CPT

## 2025-03-17 PROCEDURE — 77063 BREAST TOMOSYNTHESIS BI: CPT

## 2025-03-17 PROCEDURE — 77063 BREAST TOMOSYNTHESIS BI: CPT | Performed by: RADIOLOGY

## 2025-03-17 PROCEDURE — 77067 SCR MAMMO BI INCL CAD: CPT | Performed by: RADIOLOGY

## 2025-03-25 DIAGNOSIS — I10 ESSENTIAL HYPERTENSION: ICD-10-CM

## 2025-03-26 RX ORDER — METOPROLOL SUCCINATE 25 MG/1
50 TABLET, EXTENDED RELEASE ORAL DAILY
Qty: 180 TABLET | Refills: 0 | Status: SHIPPED | OUTPATIENT
Start: 2025-03-26

## 2025-03-26 NOTE — TELEPHONE ENCOUNTER
Message sent to pt that an appt is needed  Rx Refill Note  Requested Prescriptions     Pending Prescriptions Disp Refills    metoprolol succinate XL (TOPROL-XL) 25 MG 24 hr tablet [Pharmacy Med Name: METOPROLOL SUCCINATE ER 25MG TB24] 180 tablet 0     Sig: TAKE TWO TABLETS BY MOUTH EVERY DAY      Last office visit with prescribing clinician: Visit date not found   Last telemedicine visit with prescribing clinician: Visit date not found   Next office visit with prescribing clinician: Visit date not found                         Would you like a call back once the refill request has been completed: [] Yes [] No    If the office needs to give you a call back, can they leave a voicemail: [] Yes [] No    Mary Donaldson MA  03/26/25, 07:57 EDT

## 2025-04-09 DIAGNOSIS — R41.3 MEMORY IMPAIRMENT: ICD-10-CM

## 2025-04-09 DIAGNOSIS — E78.5 HYPERLIPIDEMIA, UNSPECIFIED HYPERLIPIDEMIA TYPE: ICD-10-CM

## 2025-04-09 RX ORDER — ROSUVASTATIN CALCIUM 5 MG/1
5 TABLET, COATED ORAL DAILY
Qty: 90 TABLET | Refills: 3 | OUTPATIENT
Start: 2025-04-09

## 2025-04-09 NOTE — TELEPHONE ENCOUNTER
[facility]-CoA Reductase Inhibitors (Statins) Protocol [id number]/09/2025 04:06 PM   Protocol Details Lipid panel in past 12 months    ALK Phos in past 12 months    ALT in past 12 months    AST in past 12 months      Rx Refill Note  Requested Prescriptions     Refused Prescriptions Disp Refills    rosuvastatin (CRESTOR) 5 MG tablet [Pharmacy Med Name: ROSUVASTATIN CALCIUM 5MG TABS] 90 tablet 3     Sig: TAKE ONE TABLET BY MOUTH EVERY DAY     Refused By: KIEL ADAMES     Reason for Refusal: Patient needs an appointment      Last office visit with prescribing clinician: 4/12/2024   Last telemedicine visit with prescribing clinician: Visit date not found   Next office visit with prescribing clinician: Visit date not found                         Would you like a call back once the refill request has been completed: [] Yes [] No    If the office needs to give you a call back, can they leave a voicemail: [] Yes [] No    Kiel Adames MA  04/09/25, 16:20 EDT

## 2025-04-10 RX ORDER — DONEPEZIL HYDROCHLORIDE 5 MG/1
5 TABLET, FILM COATED ORAL DAILY
Qty: 90 TABLET | Refills: 3 | OUTPATIENT
Start: 2025-04-10

## 2025-04-23 ENCOUNTER — OFFICE VISIT (OUTPATIENT)
Dept: INTERNAL MEDICINE | Facility: CLINIC | Age: 80
End: 2025-04-23
Payer: MEDICARE

## 2025-04-23 VITALS
WEIGHT: 134 LBS | TEMPERATURE: 97.5 F | BODY MASS INDEX: 26.17 KG/M2 | OXYGEN SATURATION: 98 % | SYSTOLIC BLOOD PRESSURE: 124 MMHG | HEART RATE: 73 BPM | DIASTOLIC BLOOD PRESSURE: 84 MMHG

## 2025-04-23 DIAGNOSIS — E78.5 HYPERLIPIDEMIA, UNSPECIFIED HYPERLIPIDEMIA TYPE: ICD-10-CM

## 2025-04-23 DIAGNOSIS — R79.89 ELEVATED SERUM CREATININE: ICD-10-CM

## 2025-04-23 DIAGNOSIS — I10 ESSENTIAL HYPERTENSION: ICD-10-CM

## 2025-04-23 DIAGNOSIS — R73.9 HYPERGLYCEMIA: ICD-10-CM

## 2025-04-23 DIAGNOSIS — Z00.00 MEDICARE ANNUAL WELLNESS VISIT, SUBSEQUENT: Primary | ICD-10-CM

## 2025-04-23 DIAGNOSIS — F01.B0 MODERATE VASCULAR DEMENTIA WITHOUT BEHAVIORAL DISTURBANCE, PSYCHOTIC DISTURBANCE, MOOD DISTURBANCE, OR ANXIETY: ICD-10-CM

## 2025-04-23 DIAGNOSIS — R41.3 MEMORY IMPAIRMENT: ICD-10-CM

## 2025-04-23 DIAGNOSIS — Z78.0 POST-MENOPAUSAL: ICD-10-CM

## 2025-04-23 RX ORDER — DONEPEZIL HYDROCHLORIDE 5 MG/1
5 TABLET, FILM COATED ORAL DAILY
Qty: 90 TABLET | Refills: 1 | Status: SHIPPED | OUTPATIENT
Start: 2025-04-23

## 2025-04-23 RX ORDER — ROSUVASTATIN CALCIUM 5 MG/1
5 TABLET, COATED ORAL DAILY
Qty: 90 TABLET | Refills: 1 | Status: SHIPPED | OUTPATIENT
Start: 2025-04-23

## 2025-04-23 RX ORDER — ROSUVASTATIN CALCIUM 5 MG/1
5 TABLET, COATED ORAL DAILY
Qty: 90 TABLET | Refills: 3 | Status: CANCELLED | OUTPATIENT
Start: 2025-04-23

## 2025-04-23 RX ORDER — METOPROLOL SUCCINATE 25 MG/1
50 TABLET, EXTENDED RELEASE ORAL DAILY
Qty: 180 TABLET | Refills: 1 | Status: SHIPPED | OUTPATIENT
Start: 2025-04-23

## 2025-04-23 RX ORDER — LISINOPRIL 20 MG/1
20 TABLET ORAL DAILY
Qty: 90 TABLET | Refills: 1 | Status: SHIPPED | OUTPATIENT
Start: 2025-04-23

## 2025-04-23 NOTE — ASSESSMENT & PLAN NOTE
Orders:    donepezil (ARICEPT) 5 MG tablet; Take 1 tablet by mouth Daily.    CBC & Differential    Comprehensive Metabolic Panel    T4, Free    TSH

## 2025-04-23 NOTE — ASSESSMENT & PLAN NOTE
Orders:    rosuvastatin (CRESTOR) 5 MG tablet; Take 1 tablet by mouth Daily.    CBC & Differential    Comprehensive Metabolic Panel    Lipid Panel With LDL / HDL Ratio

## 2025-04-23 NOTE — PATIENT INSTRUCTIONS
Advance Care Planning and Advance Directives     You make decisions on a daily basis - decisions about where you want to live, your career, your home, your life. Perhaps one of the most important decisions you face is your choice for future medical care. Take time to talk with your family and your healthcare team and start planning today.  Advance Care Planning is a process that can help you:  Understand possible future healthcare decisions in light of your own experiences  Reflect on those decision in light of your goals and values  Discuss your decisions with those closest to you and the healthcare professionals that care for you  Make a plan by creating a document that reflects your wishes    Surrogate Decision Maker  In the event of a medical emergency, which has left you unable to communicate or to make your own decisions, you would need someone to make decisions for you.  It is important to discuss your preferences for medical treatment with this person while you are in good health.     Qualities of a surrogate decision maker:  Willing to take on this role and responsibility  Knows what you want for future medical care  Willing to follow your wishes even if they don't agree with them  Able to make difficult medical decisions under stressful circumstances    Advance Directives  These are legal documents you can create that will guide your healthcare team and decision maker(s) when needed. These documents can be stored in the electronic medical record.    Living Will - a legal document to guide your care if you have a terminal condition or a serious illness and are unable to communicate. States vary by statute in document names/types, but most forms may include one or more of the following:        -  Directions regarding life-prolonging treatments        -  Directions regarding artificially provided nutrition/hydration        -  Choosing a healthcare decision maker        -  Direction regarding organ/tissue  donation    Durable Power of  for Healthcare - this document names an -in-fact to make medical decisions for you, but it may also allow this person to make personal and financial decisions for you. Please seek the advice of an  if you need this type of document.    **Advance Directives are not required and no one may discriminate against you if you do not sign one.    Medical Orders  Many states allow specific forms/orders signed by your physician to record your wishes for medical treatment in your current state of health. This form, signed in personal communication with your physician, addresses resuscitation and other medical interventions that you may or may not want.      For more information or to schedule a time with a [facility] Advance Care Planning Facilitator contact: [employer] or call [phone] and someone will contact you directly.  Medicare Wellness  Personal Prevention Plan of Service     Date of Office Visit:    Encounter Provider:  Ana Luisa Diaz MD  Place of Service:  [facility] PRIMARY CARE  Patient Name: Adali Hankins  :  1945    As part of the Medicare Wellness portion of your visit today, we are providing you with this personalized preventive plan of services (PPPS). This plan is based upon recommendations of the [place] Preventive Services Task Force (USPSTF) and the Advisory Committee on Immunization Practices (ACIP).    This lists the preventive care services that should be considered, and provides dates of when you are due. Items listed as completed are up-to-date and do not require any further intervention.    Health Maintenance   Topic Date Due    DXA SCAN  2024    LIPID PANEL  2025    COVID-19 Vaccine ( season) 2025 (Originally 2024)    ZOSTER VACCINE (2 of 2) 2025 (Originally 2018)    RSV Vaccine - Adults (1 - 1-dose 75+ series) 2026 (Originally 10/10/2019)     INFLUENZA VACCINE  07/01/2025    TDAP/TD VACCINES (2 - Td or Tdap) 08/07/2025    ANNUAL WELLNESS VISIT  04/23/2026    MAMMOGRAM  03/17/2027    COLORECTAL CANCER SCREENING  12/13/2027    Pneumococcal Vaccine 50+  Addressed       Orders Placed This Encounter   Procedures    DEXA Bone Density Axial     Standing Status:   Future     Expected Date:   4/28/2025     Expiration Date:   4/24/2026     Release to patient:   Routine Release [[id number]]     Reason for Exam::   screening     Does this patient have a diabetic monitoring/medication delivering device on?:   No     Is patient taking or have taken long term Glucocorticoid (steroids)?:   No     Does the patient have rheumatoid arthritis?:   No     Does the patient have secondary osteoporosis?:   No    Comprehensive Metabolic Panel     Release to patient:   Routine Release [[id number]]    Hemoglobin A1c     Release to patient:   Routine Release [[id number]]    Lipid Panel With LDL / HDL Ratio     Release to patient:   Routine Release [[id number]]    T4, Free     Release to patient:   Routine Release [[id number]]    TSH     Release to patient:   Routine Release [[id number]]    CBC & Differential     Manual Differential:   No     Release to patient:   Routine Release [[id number]]       Return in about 6 months (around 10/23/2025) for Recheck, labs.

## 2025-04-23 NOTE — PROGRESS NOTES
Subjective   The ABCs of the Annual Wellness Visit  Medicare Wellness Visit      Adali Hankins is a 79 y.o. patient who presents for a Medicare Wellness Visit.    The following portions of the patient's history were reviewed and   updated as appropriate: allergies, current medications, past family history, past medical history, past social history, past surgical history, and problem list.    Compared to one year ago, the patient's physical   health is the same.  Compared to one year ago, the patient's mental   health is the same.    Recent Hospitalizations:  She was not admitted to the hospital during the last year.     Current Medical Providers:  Patient Care Team:  Ana Luisa Diaz MD as PCP - General  León Duggan MD as Consulting Physician (Urology)  Regulo Galindo MD as Neurologist (Neurology)    Outpatient Medications Prior to Visit   Medication Sig Dispense Refill    ascorbic acid (VITAMIN C) 500 MG tablet Take 1 tablet by mouth Daily.      aspirin 81 MG tablet Take 1 tablet by mouth Daily.      CALCIUM CITRATE-VITAMIN D PO       Cranberry-Vitamin C (AZO CRANBERRY URINARY TRACT PO) Take  by mouth 2 (Two) Times a Day.      Diclofenac Sodium (VOLTAREN) 1 % gel gel Apply 4 g topically to the appropriate area as directed 4 (Four) Times a Day. 350 g 2    Ibuprofen 200 MG capsule As Needed.      Magnesium 500 MG capsule Take  by mouth.      multivitamin with minerals tablet tablet Take 1 tablet by mouth Daily.      donepezil (ARICEPT) 5 MG tablet Take 1 tablet by mouth Daily. 90 tablet 3    lisinopril (PRINIVIL,ZESTRIL) 20 MG tablet TAKE TWO TABLETS BY MOUTH EVERY DAY 30 tablet 0    metoprolol succinate XL (TOPROL-XL) 25 MG 24 hr tablet TAKE TWO TABLETS BY MOUTH EVERY  tablet 0    rosuvastatin (CRESTOR) 5 MG tablet Take 1 tablet by mouth Daily. 90 tablet 3     No facility-administered medications prior to visit.     No opioid medication identified on active medication list. I have  "reviewed chart for other potential  high risk medication/s and harmful drug interactions in the elderly.      Aspirin is on active medication list. Aspirin use is indicated based on review of current medical condition/s. Pros and cons of this therapy have been discussed today. Benefits of this medication outweigh potential harm.  Patient has been encouraged to continue taking this medication.  .      Patient Active Problem List   Diagnosis    Hyperlipidemia    Hyperglycemia    Hypertension    Urinary retention    Urethral cyst    HTN, white coat    Menopausal vaginal dryness    Osteopenia of left hip    Hearing loss due to cerumen impaction, bilateral    Injury of nail bed of toe    Acute URI    Observed seizure-like activity    Memory impairment    Left leg swelling     Advance Care Planning Advance Directive is not on file.  ACP discussion was held with the patient during this visit. Patient does not have an advance directive, information provided.            Objective   Vitals:    04/23/25 1436   BP: 124/84   BP Location: Left arm   Patient Position: Sitting   Cuff Size: Adult   Pulse: 73   Temp: 97.5 °F (36.4 °C)   TempSrc: Infrared   SpO2: 98%   Weight: 60.8 kg (134 lb)       Estimated body mass index is 26.17 kg/m² as calculated from the following:    Height as of 9/8/24: 152.4 cm (60\").    Weight as of this encounter: 60.8 kg (134 lb).    BMI is >= 25 and <30. (Overweight) The following options were offered after discussion;: exercise counseling/recommendations and nutrition counseling/recommendations           Does the patient have evidence of cognitive impairment? Yes                                                                                                Health  Risk Assessment    Smoking Status:  Social History     Tobacco Use   Smoking Status Former    Current packs/day: 0.00    Average packs/day: 0.5 packs/day for 5.0 years (2.5 ttl pk-yrs)    Types: Cigarettes    Start date: 1972    Quit date: " 1977    Years since quittin.3    Passive exposure: Never   Smokeless Tobacco Never     Alcohol Consumption:  Social History     Substance and Sexual Activity   Alcohol Use No       Fall Risk Screen  STEADI Fall Risk Assessment was completed, and patient is at LOW risk for falls.Assessment completed on:2025    Depression Screening   Little interest or pleasure in doing things? Not at all   Feeling down, depressed, or hopeless? Not at all   PHQ-2 Total Score 0      Health Habits and Functional and Cognitive Screenin/23/2025     3:00 PM   Functional & Cognitive Status   Do you have difficulty preparing food and eating? No   Do you have difficulty bathing yourself, getting dressed or grooming yourself? No   Do you have difficulty using the toilet? No   Do you have difficulty moving around from place to place? No   Do you have trouble with steps or getting out of a bed or a chair? No   Current Diet Well Balanced Diet   Dental Exam Other   Eye Exam Unknown   Exercise (times per week) 0 times per week   Current Exercises Include No Regular Exercise   Do you need help using the phone?  No   Are you deaf or do you have serious difficulty hearing?  Yes   Do you need help to go to places out of walking distance? Yes   Do you need help shopping? Yes   Do you need help preparing meals?  Yes   Do you need help with housework?  Yes   Do you need help with laundry? Yes   Do you need help taking your medications? Yes   Do you need help managing money? Yes   Do you ever drive or ride in a car without wearing a seat belt? No   Have you felt unusual stress, anger or loneliness in the last month? No   Who do you live with? Spouse   If you need help, do you have trouble finding someone available to you? No   Have you been bothered in the last four weeks by sexual problems? No   Do you have difficulty concentrating, remembering or making decisions? Yes           Age-appropriate Screening Schedule:  Refer to the list  below for future screening recommendations based on patient's age, sex and/or medical conditions. Orders for these recommended tests are listed in the plan section. The patient has been provided with a written plan.    Health Maintenance List  Health Maintenance   Topic Date Due    DXA SCAN  08/25/2024    LIPID PANEL  04/08/2025    COVID-19 Vaccine (1 - 2024-25 season) 05/07/2025 (Originally 9/1/2024)    ZOSTER VACCINE (2 of 2) 05/07/2025 (Originally 2/7/2018)    RSV Vaccine - Adults (1 - 1-dose 75+ series) 04/23/2026 (Originally 10/10/2019)    INFLUENZA VACCINE  07/01/2025    TDAP/TD VACCINES (2 - Td or Tdap) 08/07/2025    ANNUAL WELLNESS VISIT  04/23/2026    MAMMOGRAM  03/17/2027    COLORECTAL CANCER SCREENING  12/13/2027    Pneumococcal Vaccine 50+  Addressed                                                                                                                                                CMS Preventative Services Quick Reference  Risk Factors Identified During Encounter  Hearing Problem:  .  Immunizations Discussed/Encouraged: Shingrix, COVID19, and RSV (Respiratory Syncytial Virus)  Inadequate Social Support, Isolation, Loneliness, Lack of Transportation, Financial Difficulties, or Caregiver Stress: pt requiring more support and care to stay at home as dementia progresses  Inactivity/Sedentary:  increase activity as tolerated  Polypharmacy: Medication List reviewed and Medication changes were made    The above risks/problems have been discussed with the patient.  Pertinent information has been shared with the patient in the After Visit Summary.  An After Visit Summary and PPPS were made available to the patient.    Follow Up:   Next Medicare Wellness visit to be scheduled in 1 year.         Additional E&M Note during same encounter follows:  Patient has additional, significant, and separately identifiable condition(s)/problem(s) that require work above and beyond the Medicare Wellness Visit     Chief  Complaint  Hypertension (Follow up/ Med refill would also like to talk about a replacement or remedy for sore on her mouth due to her lisinopril )    Subjective   Hypertension      Reuben is also being seen today for additional medical problem/s.    Pt here with her  who helps provide history. Pt is not sure which meds she is taking.      HTN - pt is supposed to be on metoprolol, hctz, and lisinopril.  bp up today in clinic but better than last time.  Bp has been in the 130 range at home.  She forgot her bp log at home.  No ha/dizziness.     HLD - on statin.  No cramps or myalgias. She was worried that the crestor made her face break out.  She has been out of the crestor for about a week.       Creatinine going up.  She is not drinking much water.  She is on lisinopril.       Wbc count low on labs - this has happened transiently in the past.      Cognitive impairment - she is reading, doing puzzles and trying to walk.  She feels like she is doing fairly well. She is on aricept per neurology but hasn't been back for follow up.  Pt's  says it I hard to get her to take her medications regularly.  Her memory is getting worse.  Her  had surgery so she has been with her daughters more often.       Review of Systems   Constitutional: Negative.    HENT: Negative.     Eyes: Negative.    Respiratory: Negative.     Cardiovascular: Negative.    Gastrointestinal: Negative.    Endocrine: Negative.    Musculoskeletal: Negative.    Skin: Negative.    Allergic/Immunologic: Negative.    Neurological: Negative.  Positive for confusion.   Hematological: Negative.    Psychiatric/Behavioral:  Positive for decreased concentration.    All other systems reviewed and are negative.             Objective   Vital Signs:  /84 (BP Location: Left arm, Patient Position: Sitting, Cuff Size: Adult)   Pulse 73   Temp 97.5 °F (36.4 °C) (Infrared)   Wt 60.8 kg (134 lb)   SpO2 98%   BMI 26.17 kg/m²   Physical Exam  Vitals  and nursing note reviewed.   Constitutional:       General: She is not in acute distress.     Appearance: She is well-developed.   HENT:      Head: Normocephalic and atraumatic.      Right Ear: External ear normal.      Left Ear: External ear normal.      Nose: Nose normal.   Eyes:      Conjunctiva/sclera: Conjunctivae normal.      Pupils: Pupils are equal, round, and reactive to light.   Cardiovascular:      Rate and Rhythm: Normal rate and regular rhythm.      Heart sounds: Normal heart sounds.   Pulmonary:      Effort: Pulmonary effort is normal. No respiratory distress.      Breath sounds: Normal breath sounds. No wheezing.   Musculoskeletal:         General: Normal range of motion.      Cervical back: Normal range of motion and neck supple.      Comments: Normal gait   Skin:     General: Skin is warm and dry.   Neurological:      Mental Status: She is alert and oriented to person, place, and time.   Psychiatric:         Behavior: Behavior normal.         Thought Content: Thought content normal.         Judgment: Judgment normal.         The following data was reviewed by: Ana Luisa Diaz MD on 04/23/2025:           Assessment and Plan     Diagnoses and all orders for this visit:    1. Medicare annual wellness visit, subsequent (Primary)    2. Memory impairment - encouraged pt to f/u with neurology  Assessment & Plan:    Orders:    donepezil (ARICEPT) 5 MG tablet; Take 1 tablet by mouth Daily.    CBC & Differential    Comprehensive Metabolic Panel    T4, Free    TSH      Orders:  -     donepezil (ARICEPT) 5 MG tablet; Take 1 tablet by mouth Daily.  Dispense: 90 tablet; Refill: 1  -     CBC & Differential  -     Comprehensive Metabolic Panel  -     T4, Free  -     TSH    3. Hyperlipidemia, unspecified hyperlipidemia type  Assessment & Plan:       Orders:    rosuvastatin (CRESTOR) 5 MG tablet; Take 1 tablet by mouth Daily.    CBC & Differential    Comprehensive Metabolic Panel    Lipid Panel With LDL / HDL  Ratio      Orders:  -     rosuvastatin (CRESTOR) 5 MG tablet; Take 1 tablet by mouth Daily.  Dispense: 90 tablet; Refill: 1  -     CBC & Differential  -     Comprehensive Metabolic Panel  -     Lipid Panel With LDL / HDL Ratio    4. Essential hypertension  -     lisinopril (PRINIVIL,ZESTRIL) 20 MG tablet; Take 1 tablet by mouth Daily.  Dispense: 90 tablet; Refill: 1  -     metoprolol succinate XL (TOPROL-XL) 25 MG 24 hr tablet; Take 2 tablets by mouth Daily.  Dispense: 180 tablet; Refill: 1  -     CBC & Differential  -     Comprehensive Metabolic Panel    5. Elevated serum creatinine  -     CBC & Differential  -     Comprehensive Metabolic Panel    6. Moderate vascular dementia without behavioral disturbance, psychotic disturbance, mood disturbance, or anxiety - encourage f/u appt with neurology  -     CBC & Differential  -     Comprehensive Metabolic Panel    7. Hyperglycemia  Assessment & Plan:    Orders:    CBC & Differential    Comprehensive Metabolic Panel    Hemoglobin A1c      Orders:  -     CBC & Differential  -     Comprehensive Metabolic Panel  -     Hemoglobin A1c    8. Post-menopausal  -     DEXA Bone Density Axial; Future          Memory impairment    Orders:    donepezil (ARICEPT) 5 MG tablet; Take 1 tablet by mouth Daily.    CBC & Differential    Comprehensive Metabolic Panel    T4, Free    TSH    Hyperlipidemia, unspecified hyperlipidemia type       Orders:    rosuvastatin (CRESTOR) 5 MG tablet; Take 1 tablet by mouth Daily.    CBC & Differential    Comprehensive Metabolic Panel    Lipid Panel With LDL / HDL Ratio    Essential hypertension      Orders:    lisinopril (PRINIVIL,ZESTRIL) 20 MG tablet; Take 1 tablet by mouth Daily.    metoprolol succinate XL (TOPROL-XL) 25 MG 24 hr tablet; Take 2 tablets by mouth Daily.    CBC & Differential    Comprehensive Metabolic Panel    Elevated serum creatinine    Orders:    CBC & Differential    Comprehensive Metabolic Panel    Moderate vascular dementia without  behavioral disturbance, psychotic disturbance, mood disturbance, or anxiety      Orders:    CBC & Differential    Comprehensive Metabolic Panel    Hyperglycemia    Orders:    CBC & Differential    Comprehensive Metabolic Panel    Hemoglobin A1c    Post-menopausal    Orders:    DEXA Bone Density Axial; Future    Medicare annual wellness visit, subsequent                 Follow Up   Return in about 6 months (around 10/23/2025) for Recheck, labs.  Patient was given instructions and counseling regarding her condition or for health maintenance advice. Please see specific information pulled into the AVS if appropriate.

## 2025-05-08 ENCOUNTER — APPOINTMENT (OUTPATIENT)
Dept: BONE DENSITY | Facility: HOSPITAL | Age: 80
End: 2025-05-08
Payer: MEDICARE

## 2025-05-08 DIAGNOSIS — Z78.0 POST-MENOPAUSAL: ICD-10-CM

## 2025-05-08 PROCEDURE — 77080 DXA BONE DENSITY AXIAL: CPT

## 2025-05-20 ENCOUNTER — TELEMEDICINE (OUTPATIENT)
Dept: INTERNAL MEDICINE | Facility: CLINIC | Age: 80
End: 2025-05-20
Payer: MEDICARE

## 2025-05-20 VITALS — WEIGHT: 134 LBS | BODY MASS INDEX: 26.17 KG/M2

## 2025-05-20 DIAGNOSIS — M81.0 AGE-RELATED OSTEOPOROSIS WITHOUT CURRENT PATHOLOGICAL FRACTURE: Primary | ICD-10-CM

## 2025-05-20 RX ORDER — IBANDRONATE SODIUM 150 MG/1
150 TABLET, FILM COATED ORAL
Qty: 3 TABLET | Refills: 3 | Status: SHIPPED | OUTPATIENT
Start: 2025-05-20

## 2025-05-20 NOTE — PROGRESS NOTES
Adali Hankins is a 79 y.o. female, who presents with a chief complaint of   Chief Complaint   Patient presents with    Results     Dexa scan            HPI   This visit has been scheduled as a telehealth visit to comply with patient safety concerns in accordance with CDC recommendations. This was an audio and video enabled telemedicine encounter.    You have chosen to receive care through a televisit visit. Do you consent to use a televisit visit for your medical care today? Yes    Pt is at home and I am in the office.    Pt here with her daughter who helps provide history.  Pt's dexa scan is much worse than before.  She had osteopenia that is now osteoporosis.  She has fallen once this year but no hx fracture.  She has been on vit D and calcium at home.  We discussed treatment options including oral fosamax or boniva, IV reclast,  or continuing her calcium/vit D.            The following portions of the patient's history were reviewed and updated as appropriate: allergies, current medications, past family history, past medical history, past social history, past surgical history and problem list.    Allergies: Patient has no known allergies.    Review of Systems   Constitutional: Negative.    HENT: Negative.     Eyes: Negative.    Respiratory: Negative.     Cardiovascular: Negative.    Gastrointestinal: Negative.    Endocrine: Negative.    Genitourinary: Negative.    Musculoskeletal: Negative.    Skin: Negative.    Allergic/Immunologic: Negative.    Neurological: Negative.    Hematological: Negative.    Psychiatric/Behavioral: Negative.     All other systems reviewed and are negative.            Wt Readings from Last 3 Encounters:   05/20/25 60.8 kg (134 lb)   04/23/25 60.8 kg (134 lb)   09/08/24 63.5 kg (140 lb)     Temp Readings from Last 3 Encounters:   04/23/25 97.5 °F (36.4 °C) (Infrared)   09/08/24 98.5 °F (36.9 °C)   04/12/24 98 °F (36.7 °C) (Infrared)     BP Readings from Last 3 Encounters:   04/23/25  124/84   09/08/24 (!) 199/95   04/12/24 148/78     Pulse Readings from Last 3 Encounters:   04/23/25 73   09/08/24 77   04/12/24 67     Body mass index is 26.17 kg/m².  SpO2 Readings from Last 3 Encounters:   04/23/25 98%   09/08/24 98%   04/12/24 97%          Physical Exam  Vitals and nursing note reviewed.   Constitutional:       Appearance: She is well-developed.   HENT:      Head: Normocephalic and atraumatic.      Nose: Nose normal.   Eyes:      General:         Right eye: No discharge.         Left eye: No discharge.      Conjunctiva/sclera: Conjunctivae normal.   Pulmonary:      Effort: Pulmonary effort is normal. No respiratory distress.   Musculoskeletal:      Cervical back: Normal range of motion and neck supple.   Skin:     Findings: No rash.   Neurological:      Mental Status: She is alert and oriented to person, place, and time.   Psychiatric:         Behavior: Behavior normal.         Thought Content: Thought content normal.         Judgment: Judgment normal.         Results for orders placed or performed in visit on 04/12/24   POCT urinalysis dipstick, automated    Collection Time: 04/12/24  4:25 PM    Specimen: Urine   Result Value Ref Range    Color Yellow Yellow, Straw, Dark Yellow, Adriane    Clarity, UA Clear Clear    Specific Gravity  1.005 1.005 - 1.030    pH, Urine 5.5 5.0 - 8.0    Leukocytes Small (1+) (A) Negative    Nitrite, UA Negative Negative    Protein, POC Negative Negative mg/dL    Glucose, UA Negative Negative mg/dL    Ketones, UA Negative Negative    Urobilinogen, UA 0.2 E.U./dL Normal, 0.2 E.U./dL    Bilirubin Negative Negative    Blood, UA Trace (A) Negative    Lot Number [id number]     Expiration Date 1/14/25      Result Review :   The following data was reviewed by: Ana Luisa Diaz MD on 05/20/2025:      Data reviewed : Radiologic studies dexa scan            Assessment and Plan    Diagnoses and all orders for this visit:    1. Age-related osteoporosis without current  pathological fracture (Primary) - pt with chronic bone disease that has worsened from osteopenia to osteoporosis.  Reviewed high risk of fracture and high mortality associated with hip fractures in the elderly.  We discussed RBA of treatment options including oral fosamax or boniva, IV reclast,  or continuing her calcium/vit D. Pt and her daughter would like to try boniva.  Lab orders in epic.  Last labs > 1 year ago.  Rec pt get labs prior to starting boniva.  -     ibandronate (Boniva) 150 MG tablet; Take 1 tablet by mouth Every 30 (Thirty) Days.  Dispense: 3 tablet; Refill: 3                       Outpatient Medications Prior to Visit   Medication Sig Dispense Refill    ascorbic acid (VITAMIN C) 500 MG tablet Take 1 tablet by mouth Daily.      aspirin 81 MG tablet Take 1 tablet by mouth Daily.      CALCIUM CITRATE-VITAMIN D PO       Cranberry-Vitamin C (AZO CRANBERRY URINARY TRACT PO) Take  by mouth 2 (Two) Times a Day.      Diclofenac Sodium (VOLTAREN) 1 % gel gel Apply 4 g topically to the appropriate area as directed 4 (Four) Times a Day. 350 g 2    donepezil (ARICEPT) 5 MG tablet Take 1 tablet by mouth Daily. 90 tablet 1    Ibuprofen 200 MG capsule As Needed.      lisinopril (PRINIVIL,ZESTRIL) 20 MG tablet Take 1 tablet by mouth Daily. 90 tablet 1    Magnesium 500 MG capsule Take  by mouth.      metoprolol succinate XL (TOPROL-XL) 25 MG 24 hr tablet Take 2 tablets by mouth Daily. 180 tablet 1    multivitamin with minerals tablet tablet Take 1 tablet by mouth Daily.      rosuvastatin (CRESTOR) 5 MG tablet Take 1 tablet by mouth Daily. 90 tablet 1     No facility-administered medications prior to visit.     New Medications Ordered This Visit   Medications    ibandronate (Boniva) 150 MG tablet     Sig: Take 1 tablet by mouth Every 30 (Thirty) Days.     Dispense:  3 tablet     Refill:  3     [unfilled]  There are no discontinued medications.      No follow-ups on file.    Patient was given instructions and  counseling regarding her condition or for health maintenance advice. Please see specific information pulled into the AVS if appropriate.

## 2025-05-22 ENCOUNTER — TELEPHONE (OUTPATIENT)
Dept: INTERNAL MEDICINE | Facility: CLINIC | Age: 80
End: 2025-05-22
Payer: MEDICARE

## 2025-05-22 NOTE — TELEPHONE ENCOUNTER
OK FOR HUB TO READ.  LVM for patient to schedule labs at next available per PCP due to starting new med.

## 2025-05-23 NOTE — TELEPHONE ENCOUNTER
"  Name: Adali Hankins \"Reuben\"    Relationship: Self    Best Callback Number: [phone]    HUB PROVIDED THE RELAY MESSAGE FROM THE OFFICE   PATIENT HAS FURTHER QUESTIONS AND WOULD LIKE A CALL BACK    ADDITIONAL INFORMATION: HUB ATTEMPTED TO WARM TRANSFER FOR SCHEDULING BUT WAS UNSUCCESSFUL. PLEASE CALL AND ADVISE ON LAB AVAILABILITY    "

## 2025-05-30 LAB
ALBUMIN SERPL-MCNC: 4.2 G/DL (ref 3.5–5.2)
ALBUMIN/GLOB SERPL: 1.7 G/DL
ALP SERPL-CCNC: 65 U/L (ref 39–117)
ALT SERPL-CCNC: 10 U/L (ref 1–33)
AST SERPL-CCNC: 19 U/L (ref 1–32)
BASOPHILS # BLD AUTO: 0.04 10*3/MM3 (ref 0–0.2)
BASOPHILS NFR BLD AUTO: 1.3 % (ref 0–1.5)
BILIRUB SERPL-MCNC: 0.8 MG/DL (ref 0–1.2)
BUN SERPL-MCNC: 15 MG/DL (ref 8–23)
BUN/CREAT SERPL: 14.2 (ref 7–25)
CALCIUM SERPL-MCNC: 9.8 MG/DL (ref 8.6–10.5)
CHLORIDE SERPL-SCNC: 104 MMOL/L (ref 98–107)
CHOLEST SERPL-MCNC: 212 MG/DL (ref 0–200)
CO2 SERPL-SCNC: 28.2 MMOL/L (ref 22–29)
CREAT SERPL-MCNC: 1.06 MG/DL (ref 0.57–1)
EGFRCR SERPLBLD CKD-EPI 2021: 53.2 ML/MIN/1.73
EOSINOPHIL # BLD AUTO: 0.05 10*3/MM3 (ref 0–0.4)
EOSINOPHIL NFR BLD AUTO: 1.6 % (ref 0.3–6.2)
ERYTHROCYTE [DISTWIDTH] IN BLOOD BY AUTOMATED COUNT: 12.4 % (ref 12.3–15.4)
GLOBULIN SER CALC-MCNC: 2.5 GM/DL
GLUCOSE SERPL-MCNC: 95 MG/DL (ref 65–99)
HBA1C MFR BLD: 5.3 % (ref 4.8–5.6)
HCT VFR BLD AUTO: 38.7 % (ref 34–46.6)
HDLC SERPL-MCNC: 85 MG/DL (ref 40–60)
HGB BLD-MCNC: 13.3 G/DL (ref 12–15.9)
IMM GRANULOCYTES # BLD AUTO: 0 10*3/MM3 (ref 0–0.05)
IMM GRANULOCYTES NFR BLD AUTO: 0 % (ref 0–0.5)
LDLC SERPL CALC-MCNC: 112 MG/DL (ref 0–100)
LDLC/HDLC SERPL: 1.3 {RATIO}
LYMPHOCYTES # BLD AUTO: 1.2 10*3/MM3 (ref 0.7–3.1)
LYMPHOCYTES NFR BLD AUTO: 37.9 % (ref 19.6–45.3)
MCH RBC QN AUTO: 31.1 PG (ref 26.6–33)
MCHC RBC AUTO-ENTMCNC: 34.4 G/DL (ref 31.5–35.7)
MCV RBC AUTO: 90.6 FL (ref 79–97)
MONOCYTES # BLD AUTO: 0.38 10*3/MM3 (ref 0.1–0.9)
MONOCYTES NFR BLD AUTO: 12 % (ref 5–12)
NEUTROPHILS # BLD AUTO: 1.5 10*3/MM3 (ref 1.7–7)
NEUTROPHILS NFR BLD AUTO: 47.2 % (ref 42.7–76)
NRBC BLD AUTO-RTO: 0 /100 WBC (ref 0–0.2)
PLATELET # BLD AUTO: 155 10*3/MM3 (ref 140–450)
POTASSIUM SERPL-SCNC: 4.6 MMOL/L (ref 3.5–5.2)
PROT SERPL-MCNC: 6.7 G/DL (ref 6–8.5)
RBC # BLD AUTO: 4.27 10*6/MM3 (ref 3.77–5.28)
SODIUM SERPL-SCNC: 141 MMOL/L (ref 136–145)
T4 FREE SERPL-MCNC: 1.23 NG/DL (ref 0.92–1.68)
TRIGL SERPL-MCNC: 84 MG/DL (ref 0–150)
TSH SERPL DL<=0.005 MIU/L-ACNC: 1.23 UIU/ML (ref 0.27–4.2)
VLDLC SERPL CALC-MCNC: 15 MG/DL (ref 5–40)
WBC # BLD AUTO: 3.17 10*3/MM3 (ref 3.4–10.8)